# Patient Record
Sex: FEMALE | Race: WHITE | Employment: OTHER | URBAN - METROPOLITAN AREA
[De-identification: names, ages, dates, MRNs, and addresses within clinical notes are randomized per-mention and may not be internally consistent; named-entity substitution may affect disease eponyms.]

---

## 2017-01-04 DIAGNOSIS — Z95.0 PACEMAKER: Primary | ICD-10-CM

## 2017-01-05 RX ORDER — DILTIAZEM HYDROCHLORIDE 360 MG/1
360 CAPSULE, EXTENDED RELEASE ORAL DAILY
Qty: 90 CAPSULE | Refills: 1 | Status: SHIPPED | OUTPATIENT
Start: 2017-01-05 | End: 2017-04-26 | Stop reason: ALTCHOICE

## 2017-01-05 RX ORDER — PRAVASTATIN SODIUM 10 MG
10 TABLET ORAL DAILY
Qty: 30 TABLET | Refills: 3 | Status: SHIPPED | OUTPATIENT
Start: 2017-01-05 | End: 2017-04-26 | Stop reason: SDUPTHER

## 2017-01-23 ENCOUNTER — TELEPHONE (OUTPATIENT)
Dept: CARDIOLOGY CLINIC | Age: 82
End: 2017-01-23

## 2017-01-23 DIAGNOSIS — I48.19 PERSISTENT ATRIAL FIBRILLATION (HCC): Primary | ICD-10-CM

## 2017-01-23 DIAGNOSIS — Z95.0 PACEMAKER: ICD-10-CM

## 2017-01-23 RX ORDER — DILTIAZEM HYDROCHLORIDE 180 MG/1
180 CAPSULE, COATED, EXTENDED RELEASE ORAL DAILY
Qty: 60 CAPSULE | Refills: 0 | Status: SHIPPED | OUTPATIENT
Start: 2017-01-23 | End: 2017-02-06 | Stop reason: SDUPTHER

## 2017-01-30 ENCOUNTER — TELEPHONE (OUTPATIENT)
Dept: CARDIOLOGY CLINIC | Age: 82
End: 2017-01-30

## 2017-01-31 ENCOUNTER — PROCEDURE VISIT (OUTPATIENT)
Dept: CARDIOLOGY CLINIC | Age: 82
End: 2017-01-31

## 2017-01-31 DIAGNOSIS — Z95.0 CARDIAC PACEMAKER IN SITU: Primary | ICD-10-CM

## 2017-01-31 PROCEDURE — 93280 PM DEVICE PROGR EVAL DUAL: CPT | Performed by: INTERNAL MEDICINE

## 2017-02-06 DIAGNOSIS — Z95.0 PACEMAKER: ICD-10-CM

## 2017-02-06 DIAGNOSIS — I48.19 PERSISTENT ATRIAL FIBRILLATION (HCC): ICD-10-CM

## 2017-02-06 RX ORDER — DILTIAZEM HYDROCHLORIDE 180 MG/1
180 CAPSULE, COATED, EXTENDED RELEASE ORAL DAILY
Qty: 180 CAPSULE | Refills: 1 | Status: SHIPPED | OUTPATIENT
Start: 2017-02-06 | End: 2017-04-26 | Stop reason: ALTCHOICE

## 2017-02-08 ENCOUNTER — TELEPHONE (OUTPATIENT)
Dept: CARDIOLOGY CLINIC | Age: 82
End: 2017-02-08

## 2017-02-09 ENCOUNTER — TELEPHONE (OUTPATIENT)
Dept: CARDIOLOGY CLINIC | Age: 82
End: 2017-02-09

## 2017-02-09 DIAGNOSIS — I48.19 PERSISTENT ATRIAL FIBRILLATION (HCC): Primary | ICD-10-CM

## 2017-02-09 RX ORDER — DILTIAZEM HYDROCHLORIDE 360 MG/1
360 CAPSULE, EXTENDED RELEASE ORAL DAILY
Qty: 90 CAPSULE | Refills: 3 | Status: SHIPPED | OUTPATIENT
Start: 2017-02-09 | End: 2017-11-27 | Stop reason: SDUPTHER

## 2017-02-10 ENCOUNTER — TELEPHONE (OUTPATIENT)
Dept: CARDIOLOGY CLINIC | Age: 82
End: 2017-02-10

## 2017-04-26 ENCOUNTER — OFFICE VISIT (OUTPATIENT)
Dept: CARDIOLOGY CLINIC | Age: 82
End: 2017-04-26

## 2017-04-26 VITALS
BODY MASS INDEX: 20.81 KG/M2 | SYSTOLIC BLOOD PRESSURE: 142 MMHG | HEIGHT: 67 IN | DIASTOLIC BLOOD PRESSURE: 66 MMHG | WEIGHT: 132.6 LBS | HEART RATE: 84 BPM

## 2017-04-26 DIAGNOSIS — Z95.0 PACEMAKER: Primary | ICD-10-CM

## 2017-04-26 PROCEDURE — 93280 PM DEVICE PROGR EVAL DUAL: CPT | Performed by: INTERNAL MEDICINE

## 2017-04-26 PROCEDURE — G8419 CALC BMI OUT NRM PARAM NOF/U: HCPCS | Performed by: INTERNAL MEDICINE

## 2017-04-26 PROCEDURE — 1090F PRES/ABSN URINE INCON ASSESS: CPT | Performed by: INTERNAL MEDICINE

## 2017-04-26 PROCEDURE — 1036F TOBACCO NON-USER: CPT | Performed by: INTERNAL MEDICINE

## 2017-04-26 PROCEDURE — G8427 DOCREV CUR MEDS BY ELIG CLIN: HCPCS | Performed by: INTERNAL MEDICINE

## 2017-04-26 PROCEDURE — 4040F PNEUMOC VAC/ADMIN/RCVD: CPT | Performed by: INTERNAL MEDICINE

## 2017-04-26 PROCEDURE — 99213 OFFICE O/P EST LOW 20 MIN: CPT | Performed by: INTERNAL MEDICINE

## 2017-04-26 PROCEDURE — 1123F ACP DISCUSS/DSCN MKR DOCD: CPT | Performed by: INTERNAL MEDICINE

## 2017-04-26 RX ORDER — PRAVASTATIN SODIUM 10 MG
10 TABLET ORAL DAILY
Qty: 90 TABLET | Refills: 2 | Status: SHIPPED | OUTPATIENT
Start: 2017-04-26 | End: 2017-11-27 | Stop reason: SDUPTHER

## 2017-04-26 ASSESSMENT — ENCOUNTER SYMPTOMS
BACK PAIN: 0
DIARRHEA: 0
NAUSEA: 0
CHEST TIGHTNESS: 0
VOMITING: 0
COLOR CHANGE: 0
WHEEZING: 0
BLOOD IN STOOL: 0
EYE PAIN: 0
ABDOMINAL PAIN: 0
PHOTOPHOBIA: 0
COUGH: 0
SHORTNESS OF BREATH: 1
CONSTIPATION: 0

## 2017-05-01 ENCOUNTER — TELEPHONE (OUTPATIENT)
Dept: CARDIOLOGY CLINIC | Age: 82
End: 2017-05-01

## 2017-05-02 ENCOUNTER — TELEPHONE (OUTPATIENT)
Dept: CARDIOLOGY CLINIC | Age: 82
End: 2017-05-02

## 2017-05-08 ENCOUNTER — TELEPHONE (OUTPATIENT)
Dept: CARDIOLOGY CLINIC | Age: 82
End: 2017-05-08

## 2017-05-09 ENCOUNTER — PROCEDURE VISIT (OUTPATIENT)
Dept: CARDIOLOGY CLINIC | Age: 82
End: 2017-05-09

## 2017-05-09 DIAGNOSIS — Z95.0 CARDIAC PACEMAKER IN SITU: Primary | ICD-10-CM

## 2017-05-09 PROCEDURE — 93294 REM INTERROG EVL PM/LDLS PM: CPT | Performed by: INTERNAL MEDICINE

## 2017-05-09 PROCEDURE — 93296 REM INTERROG EVL PM/IDS: CPT | Performed by: INTERNAL MEDICINE

## 2017-08-14 ENCOUNTER — PROCEDURE VISIT (OUTPATIENT)
Dept: CARDIOLOGY CLINIC | Age: 82
End: 2017-08-14

## 2017-08-14 DIAGNOSIS — Z95.0 CARDIAC PACEMAKER IN SITU: Primary | ICD-10-CM

## 2017-08-14 PROCEDURE — 93296 REM INTERROG EVL PM/IDS: CPT | Performed by: INTERNAL MEDICINE

## 2017-08-14 PROCEDURE — 93294 REM INTERROG EVL PM/LDLS PM: CPT | Performed by: INTERNAL MEDICINE

## 2017-10-25 ENCOUNTER — TELEPHONE (OUTPATIENT)
Dept: CARDIOLOGY CLINIC | Age: 82
End: 2017-10-25

## 2017-10-26 ENCOUNTER — TELEPHONE (OUTPATIENT)
Dept: CARDIOLOGY CLINIC | Age: 82
End: 2017-10-26

## 2017-11-08 ENCOUNTER — OFFICE VISIT (OUTPATIENT)
Dept: CARDIOLOGY CLINIC | Age: 82
End: 2017-11-08

## 2017-11-08 VITALS
HEIGHT: 67 IN | HEART RATE: 80 BPM | BODY MASS INDEX: 22.76 KG/M2 | WEIGHT: 145 LBS | DIASTOLIC BLOOD PRESSURE: 62 MMHG | SYSTOLIC BLOOD PRESSURE: 136 MMHG

## 2017-11-08 DIAGNOSIS — I48.0 PAF (PAROXYSMAL ATRIAL FIBRILLATION) (HCC): Primary | ICD-10-CM

## 2017-11-08 PROCEDURE — 1123F ACP DISCUSS/DSCN MKR DOCD: CPT | Performed by: INTERNAL MEDICINE

## 2017-11-08 PROCEDURE — 1036F TOBACCO NON-USER: CPT | Performed by: INTERNAL MEDICINE

## 2017-11-08 PROCEDURE — G8484 FLU IMMUNIZE NO ADMIN: HCPCS | Performed by: INTERNAL MEDICINE

## 2017-11-08 PROCEDURE — G8428 CUR MEDS NOT DOCUMENT: HCPCS | Performed by: INTERNAL MEDICINE

## 2017-11-08 PROCEDURE — 4040F PNEUMOC VAC/ADMIN/RCVD: CPT | Performed by: INTERNAL MEDICINE

## 2017-11-08 PROCEDURE — G8420 CALC BMI NORM PARAMETERS: HCPCS | Performed by: INTERNAL MEDICINE

## 2017-11-08 PROCEDURE — 99213 OFFICE O/P EST LOW 20 MIN: CPT | Performed by: INTERNAL MEDICINE

## 2017-11-08 PROCEDURE — 1090F PRES/ABSN URINE INCON ASSESS: CPT | Performed by: INTERNAL MEDICINE

## 2017-11-08 ASSESSMENT — ENCOUNTER SYMPTOMS
ABDOMINAL PAIN: 0
NAUSEA: 0
DIARRHEA: 0
EYE PAIN: 0
BLOOD IN STOOL: 0
COUGH: 0
PHOTOPHOBIA: 0
VOMITING: 0
CHEST TIGHTNESS: 0
CONSTIPATION: 0
COLOR CHANGE: 0
WHEEZING: 0
SHORTNESS OF BREATH: 1
BACK PAIN: 0

## 2017-11-08 NOTE — PROGRESS NOTES
Electrophysiology FU Note      Chief complaint :  Shortness of breath    Primary care physician: Adam Lozano MD      History of Present Illness: This visit (11/8/2017)      Chief Complaint   Patient presents with    Loss of Consciousness     Gu-6 month follow. Patient denies CP, dizziness or palpitations. She does report occasional SOB on exertion as well as swelling to the right ankle off and on. She is no longer taking diltiazem and it was replaced with another medication but patient or daughter can't recall the medication at this time. Previous visit:  (4/26/2017)      Chief Complaint   Patient presents with    6 Month Follow-Up     Pt here for 6 month office visit. Patient denies CP, dizziness or palpitations. Does report some SOB with exertion and swelling to the right leg which improved. She tripped and fell few days ago. Previous visit:    Chief Complaint   Patient presents with    3 Month Follow-Up     here for 3 month follow up from the pacemaker implant. She states that there is some tingling around the pacemaker. Her incision is healed well. She denies chest pain, shortness of breath, dizziness, palpitations, and edema. Past medical history:   Past Medical History:   Diagnosis Date    Arthritis     Hyperlipidemia     Hypertension     Pacemaker 6/9/16    Medtronic Dual chamber implanted per Angie Artist for Asystole for 6 seconds (SSS & intermittent high grade AVB).  SSS (sick sinus syndrome) ([address] 6/16       Surgical history :   Past Surgical History:   Procedure Laterality Date    APPENDECTOMY      BACK SURGERY      HIP SURGERY      KNEE SURGERY      PACEMAKER INSERTION  6/9/16    Medtronic Dual-Chamber pacemaker per Angie Artist. Family history: no sudden death history    Social history :  reports that she has never smoked. She does not have any smokeless tobacco history on file.  She reports that she does not drink alcohol or use drugs. No Known Allergies    Current Outpatient Prescriptions on File Prior to Visit   Medication Sig Dispense Refill    Acetaminophen 650 MG TABS Take 650 mg by mouth every 6 hours as needed 120 tablet 3    donepezil (ARICEPT) 10 MG tablet Take 20 mg by mouth nightly      docusate sodium (COLACE) 100 MG capsule Take 1 capsule by mouth 2 times daily. 30 capsule 0    aspirin 81 MG chewable tablet Take 81 mg by mouth daily.  calcium carbonate (OSCAL) 500 MG TABS tablet Take 500 mg by mouth nightly.  Omega-3 Fatty Acids (FISH OIL) 1200 MG CAPS Take 1,200 mg by mouth Daily.  omeprazole (PRILOSEC) 20 MG capsule Take 20 mg by mouth daily.  MULTIPLE VITAMIN PO Take  by mouth daily. Cardizem 240 now change to 360mg daily  Simvastatin changed to pravastatin    Review of Systems:   Review of Systems   Constitutional: Negative for activity change, chills and fever. HENT: Negative for congestion, ear pain and tinnitus. Eyes: Negative for photophobia, pain and visual disturbance. Respiratory: Positive for shortness of breath. Negative for cough, chest tightness and wheezing. Cardiovascular: Negative for chest pain, palpitations and leg swelling. Gastrointestinal: Negative for abdominal pain, blood in stool, constipation, diarrhea, nausea and vomiting. Endocrine: Negative for cold intolerance and heat intolerance. Genitourinary: Negative for dysuria, flank pain and hematuria. Musculoskeletal: Positive for arthralgias. Negative for back pain, myalgias and neck stiffness. Skin: Negative for color change and rash. Allergic/Immunologic: Negative for food allergies. Neurological: Negative for dizziness, light-headedness, numbness and headaches. Hematological: Does not bruise/bleed easily. Psychiatric/Behavioral: Negative for agitation, behavioral problems and confusion.            Physical Examination:    /62   Pulse 80   Ht 5' 7\" (1.702 m) Wt 145 lb (65.8 kg)   BMI 22.71 kg/m²    Wt Readings from Last 3 Encounters:   11/08/17 145 lb (65.8 kg)   08/22/17 132 lb (59.9 kg)   04/26/17 132 lb 9.6 oz (60.1 kg)     Body mass index is 22.71 kg/m². Physical Exam   Constitutional: She is oriented to person, place, and time and well-developed, well-nourished, and in no distress. HENT:   Head: Normocephalic and atraumatic. Eyes: Conjunctivae and EOM are normal. Pupils are equal, round, and reactive to light. Right eye exhibits no discharge. Neck: Normal range of motion. No JVD present. No thyromegaly present. Cardiovascular: Normal rate, regular rhythm and normal heart sounds. Pulmonary/Chest: Effort normal and breath sounds normal. No stridor. No respiratory distress. She has no wheezes. Abdominal: Soft. Bowel sounds are normal. She exhibits no distension. There is no tenderness. Musculoskeletal: Normal range of motion. She exhibits no edema or tenderness. Neurological: She is alert and oriented to person, place, and time. No cranial nerve deficit. Skin: Skin is warm and dry. No rash noted. No erythema. Psychiatric: Mood and affect normal.               CBC:   Lab Results   Component Value Date    WBC 8.6 08/22/2017    HGB 14.5 08/22/2017    HCT 43.4 08/22/2017     08/22/2017     Lipids:   Lab Results   Component Value Date    CHOL 119 05/28/2014    TRIG 94 05/28/2014    HDL 46 (L) 05/28/2014    LDLDIRECT 57 05/28/2014     PT/INR:   Lab Results   Component Value Date    INR 0.97 02/10/2015        BMP:    Lab Results   Component Value Date     08/22/2017    K 4.1 08/22/2017    CL 98 (L) 08/22/2017    CO2 25 08/22/2017    BUN 21 08/22/2017     CMP:   Lab Results   Component Value Date    AST 21 08/22/2017    PROT 7.0 08/22/2017    BILITOT 0.3 08/22/2017    ALKPHOS 88 08/22/2017     TSH:  No results found for: TSH        IMPRESSION / RECOMMENDATIONS:     1. Sick sinus syndrome Sp pacemaker   2. HTN  3. HLD  4.  Mild dementia  5. ?PAF    Device Assessment:      The device is Medtronic pacemaker - Dual Chamber chamber      MRI Compatible : yes    Device interrogation was performed. Mode: aair---dddr     Sensing is normal. Impedence is normal.  Threshold is normal.     There has not been interval changes. Estimated battery life is 8.5 years     The underlying rhythm is as, vs.  36.2 % atrial paced; 0.2 % ventricular paced. Atrial Arrhythmia : No    Non sustained VT episodes : No    Sustained VT episodes : No    Patient activity reported 2.1 hrs/day    The patient partial pacemaker dependent.         Patient on aspirin - no atrial fibrillation noted  Patient is doing well over all  Continue cardizem (I am not sure if PCP changed her to some other cardizem - patient daughter is going to call us and let us know)   Follow up in 6 months        Yung Small MD, 11/8/2017 3:17 PM

## 2017-11-09 ENCOUNTER — TELEPHONE (OUTPATIENT)
Dept: CARDIOLOGY CLINIC | Age: 82
End: 2017-11-09

## 2017-11-20 ENCOUNTER — PROCEDURE VISIT (OUTPATIENT)
Dept: CARDIOLOGY CLINIC | Age: 82
End: 2017-11-20

## 2017-11-20 DIAGNOSIS — Z95.0 CARDIAC PACEMAKER IN SITU: Primary | ICD-10-CM

## 2017-11-20 PROCEDURE — 93294 REM INTERROG EVL PM/LDLS PM: CPT | Performed by: INTERNAL MEDICINE

## 2017-11-20 PROCEDURE — 93296 REM INTERROG EVL PM/IDS: CPT | Performed by: INTERNAL MEDICINE

## 2017-11-27 DIAGNOSIS — I48.19 PERSISTENT ATRIAL FIBRILLATION (HCC): ICD-10-CM

## 2017-11-27 DIAGNOSIS — Z95.0 PACEMAKER: ICD-10-CM

## 2017-11-28 RX ORDER — DILTIAZEM HYDROCHLORIDE 360 MG/1
CAPSULE, EXTENDED RELEASE ORAL
Qty: 90 CAPSULE | Refills: 3 | Status: ON HOLD | OUTPATIENT
Start: 2017-11-28 | End: 2018-07-23 | Stop reason: HOSPADM

## 2017-11-28 RX ORDER — PRAVASTATIN SODIUM 10 MG
TABLET ORAL
Qty: 90 TABLET | Refills: 2 | Status: SHIPPED | OUTPATIENT
Start: 2017-11-28 | End: 2018-06-22 | Stop reason: SDUPTHER

## 2018-02-26 ENCOUNTER — PROCEDURE VISIT (OUTPATIENT)
Dept: CARDIOLOGY CLINIC | Age: 83
End: 2018-02-26

## 2018-02-26 DIAGNOSIS — Z95.0 CARDIAC PACEMAKER IN SITU: Primary | ICD-10-CM

## 2018-02-26 PROCEDURE — 93296 REM INTERROG EVL PM/IDS: CPT | Performed by: INTERNAL MEDICINE

## 2018-02-26 PROCEDURE — 93294 REM INTERROG EVL PM/LDLS PM: CPT | Performed by: INTERNAL MEDICINE

## 2018-05-11 ENCOUNTER — OFFICE VISIT (OUTPATIENT)
Dept: CARDIOLOGY CLINIC | Age: 83
End: 2018-05-11

## 2018-05-11 ENCOUNTER — TELEPHONE (OUTPATIENT)
Dept: CARDIOLOGY CLINIC | Age: 83
End: 2018-05-11

## 2018-05-11 VITALS
WEIGHT: 144.8 LBS | OXYGEN SATURATION: 97 % | BODY MASS INDEX: 23.27 KG/M2 | HEART RATE: 70 BPM | HEIGHT: 66 IN | DIASTOLIC BLOOD PRESSURE: 60 MMHG | SYSTOLIC BLOOD PRESSURE: 122 MMHG

## 2018-05-11 DIAGNOSIS — Z95.0 PACEMAKER: Primary | ICD-10-CM

## 2018-05-11 PROCEDURE — 1090F PRES/ABSN URINE INCON ASSESS: CPT | Performed by: INTERNAL MEDICINE

## 2018-05-11 PROCEDURE — 99212 OFFICE O/P EST SF 10 MIN: CPT | Performed by: INTERNAL MEDICINE

## 2018-05-11 PROCEDURE — G8420 CALC BMI NORM PARAMETERS: HCPCS | Performed by: INTERNAL MEDICINE

## 2018-05-11 PROCEDURE — G8427 DOCREV CUR MEDS BY ELIG CLIN: HCPCS | Performed by: INTERNAL MEDICINE

## 2018-05-11 PROCEDURE — 1123F ACP DISCUSS/DSCN MKR DOCD: CPT | Performed by: INTERNAL MEDICINE

## 2018-05-11 PROCEDURE — 93280 PM DEVICE PROGR EVAL DUAL: CPT | Performed by: INTERNAL MEDICINE

## 2018-05-11 PROCEDURE — 1036F TOBACCO NON-USER: CPT | Performed by: INTERNAL MEDICINE

## 2018-05-11 PROCEDURE — 4040F PNEUMOC VAC/ADMIN/RCVD: CPT | Performed by: INTERNAL MEDICINE

## 2018-05-11 ASSESSMENT — ENCOUNTER SYMPTOMS
WHEEZING: 0
BLOOD IN STOOL: 0
VOMITING: 0
COLOR CHANGE: 0
ABDOMINAL PAIN: 0
CONSTIPATION: 0
EYE PAIN: 0
BACK PAIN: 0
SHORTNESS OF BREATH: 1
COUGH: 0
DIARRHEA: 0
PHOTOPHOBIA: 0
NAUSEA: 0
CHEST TIGHTNESS: 0

## 2018-05-14 ENCOUNTER — TELEPHONE (OUTPATIENT)
Dept: CARDIOLOGY CLINIC | Age: 83
End: 2018-05-14

## 2018-06-04 ENCOUNTER — TELEPHONE (OUTPATIENT)
Dept: CARDIOLOGY CLINIC | Age: 83
End: 2018-06-04

## 2018-06-07 ENCOUNTER — PROCEDURE VISIT (OUTPATIENT)
Dept: CARDIOLOGY CLINIC | Age: 83
End: 2018-06-07

## 2018-06-07 ENCOUNTER — TELEPHONE (OUTPATIENT)
Dept: CARDIOLOGY CLINIC | Age: 83
End: 2018-06-07

## 2018-06-07 DIAGNOSIS — Z95.0 CARDIAC PACEMAKER IN SITU: Primary | ICD-10-CM

## 2018-06-07 PROCEDURE — 93294 REM INTERROG EVL PM/LDLS PM: CPT | Performed by: INTERNAL MEDICINE

## 2018-06-07 PROCEDURE — 93296 REM INTERROG EVL PM/IDS: CPT | Performed by: INTERNAL MEDICINE

## 2018-06-22 DIAGNOSIS — Z95.0 PACEMAKER: ICD-10-CM

## 2018-06-22 RX ORDER — PRAVASTATIN SODIUM 10 MG
TABLET ORAL
Qty: 90 TABLET | Refills: 1 | Status: SHIPPED | OUTPATIENT
Start: 2018-06-22 | End: 2018-11-12 | Stop reason: SDUPTHER

## 2018-07-21 PROBLEM — K59.00 OBSTIPATION: Status: ACTIVE | Noted: 2018-07-21

## 2018-09-09 PROCEDURE — 93296 REM INTERROG EVL PM/IDS: CPT | Performed by: INTERNAL MEDICINE

## 2018-09-09 PROCEDURE — 93294 REM INTERROG EVL PM/LDLS PM: CPT | Performed by: INTERNAL MEDICINE

## 2018-09-10 ENCOUNTER — PROCEDURE VISIT (OUTPATIENT)
Dept: CARDIOLOGY CLINIC | Age: 83
End: 2018-09-10

## 2018-09-10 DIAGNOSIS — Z95.0 CARDIAC PACEMAKER IN SITU: Primary | ICD-10-CM

## 2018-11-12 DIAGNOSIS — Z95.0 PACEMAKER: ICD-10-CM

## 2018-11-13 RX ORDER — PRAVASTATIN SODIUM 10 MG
10 TABLET ORAL DAILY
Qty: 90 TABLET | Refills: 3 | Status: SHIPPED | OUTPATIENT
Start: 2018-11-13 | End: 2019-05-10 | Stop reason: CLARIF

## 2018-11-18 ENCOUNTER — APPOINTMENT (OUTPATIENT)
Dept: CT IMAGING | Age: 83
End: 2018-11-18
Payer: MEDICARE

## 2018-11-18 ENCOUNTER — HOSPITAL ENCOUNTER (EMERGENCY)
Age: 83
Discharge: HOME | End: 2018-11-18
Attending: EMERGENCY MEDICINE
Payer: MEDICARE

## 2018-11-18 VITALS
SYSTOLIC BLOOD PRESSURE: 197 MMHG | BODY MASS INDEX: 21.22 KG/M2 | OXYGEN SATURATION: 99 % | RESPIRATION RATE: 18 BRPM | DIASTOLIC BLOOD PRESSURE: 83 MMHG | WEIGHT: 140 LBS | TEMPERATURE: 98.2 F | HEIGHT: 68 IN | HEART RATE: 70 BPM

## 2018-11-18 DIAGNOSIS — K56.41 FECAL IMPACTION (HCC): Primary | ICD-10-CM

## 2018-11-18 LAB
ALBUMIN SERPL-MCNC: 3.5 GM/DL (ref 3.4–5)
ALP BLD-CCNC: 68 IU/L (ref 40–129)
ALT SERPL-CCNC: 13 U/L (ref 10–40)
ANION GAP SERPL CALCULATED.3IONS-SCNC: 11 MMOL/L (ref 4–16)
AST SERPL-CCNC: 19 IU/L (ref 15–37)
BASOPHILS ABSOLUTE: 0.1 K/CU MM
BASOPHILS RELATIVE PERCENT: 0.5 % (ref 0–1)
BILIRUB SERPL-MCNC: 0.3 MG/DL (ref 0–1)
BUN BLDV-MCNC: 17 MG/DL (ref 6–23)
CALCIUM SERPL-MCNC: 9.4 MG/DL (ref 8.3–10.6)
CHLORIDE BLD-SCNC: 101 MMOL/L (ref 99–110)
CO2: 27 MMOL/L (ref 21–32)
CREAT SERPL-MCNC: 1 MG/DL (ref 0.6–1.1)
DIFFERENTIAL TYPE: ABNORMAL
EOSINOPHILS ABSOLUTE: 0.1 K/CU MM
EOSINOPHILS RELATIVE PERCENT: 0.8 % (ref 0–3)
GFR AFRICAN AMERICAN: >60 ML/MIN/1.73M2
GFR NON-AFRICAN AMERICAN: 51 ML/MIN/1.73M2
GLUCOSE BLD-MCNC: 103 MG/DL (ref 70–99)
HCT VFR BLD CALC: 44.5 % (ref 37–47)
HEMOGLOBIN: 14.3 GM/DL (ref 12.5–16)
IMMATURE NEUTROPHIL %: 0.4 % (ref 0–0.43)
LYMPHOCYTES ABSOLUTE: 1.1 K/CU MM
LYMPHOCYTES RELATIVE PERCENT: 9.3 % (ref 24–44)
MCH RBC QN AUTO: 29.5 PG (ref 27–31)
MCHC RBC AUTO-ENTMCNC: 32.1 % (ref 32–36)
MCV RBC AUTO: 91.8 FL (ref 78–100)
MONOCYTES ABSOLUTE: 0.9 K/CU MM
MONOCYTES RELATIVE PERCENT: 7.9 % (ref 0–4)
NUCLEATED RBC %: 0 %
PDW BLD-RTO: 14.6 % (ref 11.7–14.9)
PLATELET # BLD: 210 K/CU MM (ref 140–440)
PMV BLD AUTO: 11.5 FL (ref 7.5–11.1)
POTASSIUM SERPL-SCNC: 3.6 MMOL/L (ref 3.5–5.1)
RBC # BLD: 4.85 M/CU MM (ref 4.2–5.4)
SEGMENTED NEUTROPHILS ABSOLUTE COUNT: 9.2 K/CU MM
SEGMENTED NEUTROPHILS RELATIVE PERCENT: 81.1 % (ref 36–66)
SODIUM BLD-SCNC: 139 MMOL/L (ref 135–145)
TOTAL IMMATURE NEUTOROPHIL: 0.04 K/CU MM
TOTAL NUCLEATED RBC: 0 K/CU MM
TOTAL PROTEIN: 6.4 GM/DL (ref 6.4–8.2)
TSH HIGH SENSITIVITY: 4.23 UIU/ML (ref 0.27–4.2)
WBC # BLD: 11.4 K/CU MM (ref 4–10.5)

## 2018-11-18 PROCEDURE — 6370000000 HC RX 637 (ALT 250 FOR IP): Performed by: EMERGENCY MEDICINE

## 2018-11-18 PROCEDURE — 85025 COMPLETE CBC W/AUTO DIFF WBC: CPT

## 2018-11-18 PROCEDURE — 36415 COLL VENOUS BLD VENIPUNCTURE: CPT

## 2018-11-18 PROCEDURE — 84443 ASSAY THYROID STIM HORMONE: CPT

## 2018-11-18 PROCEDURE — 99284 EMERGENCY DEPT VISIT MOD MDM: CPT

## 2018-11-18 PROCEDURE — 74176 CT ABD & PELVIS W/O CONTRAST: CPT

## 2018-11-18 PROCEDURE — 80053 COMPREHEN METABOLIC PANEL: CPT

## 2018-11-18 RX ORDER — HYDROCHLOROTHIAZIDE 12.5 MG/1
12.5 CAPSULE, GELATIN COATED ORAL DAILY
Status: ON HOLD | COMMUNITY
End: 2019-09-10 | Stop reason: HOSPADM

## 2018-11-18 RX ORDER — SODIUM PHOSPHATE, DIBASIC AND SODIUM PHOSPHATE, MONOBASIC 7; 19 G/133ML; G/133ML
1 ENEMA RECTAL
Qty: 3 BOTTLE | Refills: 0 | Status: SHIPPED | OUTPATIENT
Start: 2018-11-18 | End: 2018-11-18

## 2018-11-18 RX ADMIN — MAGNESIUM HYDROXIDE 30 ML: 400 SUSPENSION ORAL at 14:56

## 2018-11-18 ASSESSMENT — PAIN DESCRIPTION - PROGRESSION: CLINICAL_PROGRESSION: NOT CHANGED

## 2018-11-18 ASSESSMENT — PAIN DESCRIPTION - FREQUENCY: FREQUENCY: INTERMITTENT

## 2018-11-18 ASSESSMENT — PAIN DESCRIPTION - LOCATION: LOCATION: BUTTOCKS

## 2018-11-18 ASSESSMENT — PAIN DESCRIPTION - PAIN TYPE: TYPE: ACUTE PAIN

## 2018-11-18 ASSESSMENT — PAIN DESCRIPTION - DESCRIPTORS: DESCRIPTORS: PRESSURE

## 2018-11-18 ASSESSMENT — PAIN DESCRIPTION - ONSET: ONSET: GRADUAL

## 2018-11-18 NOTE — ED NOTES
Pt had a small bowel movement. Pt instructed to focus on a BM today and proper use of prescribed medication. Pt and family verbalize understanding.      Rustam Harper RN  11/18/18 [id number]

## 2018-12-16 PROCEDURE — 93294 REM INTERROG EVL PM/LDLS PM: CPT | Performed by: INTERNAL MEDICINE

## 2018-12-16 PROCEDURE — 93296 REM INTERROG EVL PM/IDS: CPT | Performed by: INTERNAL MEDICINE

## 2018-12-18 ENCOUNTER — PROCEDURE VISIT (OUTPATIENT)
Dept: CARDIOLOGY CLINIC | Age: 83
End: 2018-12-18
Payer: MEDICARE

## 2018-12-18 DIAGNOSIS — Z95.0 CARDIAC PACEMAKER IN SITU: Primary | ICD-10-CM

## 2019-01-17 ENCOUNTER — TELEPHONE (OUTPATIENT)
Dept: CARDIOLOGY CLINIC | Age: 84
End: 2019-01-17

## 2019-03-26 ENCOUNTER — PROCEDURE VISIT (OUTPATIENT)
Dept: CARDIOLOGY CLINIC | Age: 84
End: 2019-03-26
Payer: MEDICARE

## 2019-03-26 DIAGNOSIS — I49.5 SINUS NODE DYSFUNCTION (HCC): ICD-10-CM

## 2019-03-26 DIAGNOSIS — Z95.0 CARDIAC PACEMAKER IN SITU: Primary | ICD-10-CM

## 2019-03-26 PROCEDURE — 93296 REM INTERROG EVL PM/IDS: CPT | Performed by: INTERNAL MEDICINE

## 2019-03-26 PROCEDURE — 93294 REM INTERROG EVL PM/LDLS PM: CPT | Performed by: INTERNAL MEDICINE

## 2019-05-10 ENCOUNTER — OFFICE VISIT (OUTPATIENT)
Dept: CARDIOLOGY CLINIC | Age: 84
End: 2019-05-10
Payer: MEDICARE

## 2019-05-10 VITALS
DIASTOLIC BLOOD PRESSURE: 76 MMHG | WEIGHT: 140.6 LBS | SYSTOLIC BLOOD PRESSURE: 132 MMHG | BODY MASS INDEX: 22.07 KG/M2 | HEART RATE: 68 BPM | HEIGHT: 67 IN

## 2019-05-10 DIAGNOSIS — Z95.0 PACEMAKER: Primary | ICD-10-CM

## 2019-05-10 PROCEDURE — 4040F PNEUMOC VAC/ADMIN/RCVD: CPT | Performed by: INTERNAL MEDICINE

## 2019-05-10 PROCEDURE — G8427 DOCREV CUR MEDS BY ELIG CLIN: HCPCS | Performed by: INTERNAL MEDICINE

## 2019-05-10 PROCEDURE — 1090F PRES/ABSN URINE INCON ASSESS: CPT | Performed by: INTERNAL MEDICINE

## 2019-05-10 PROCEDURE — 99212 OFFICE O/P EST SF 10 MIN: CPT | Performed by: INTERNAL MEDICINE

## 2019-05-10 PROCEDURE — G8420 CALC BMI NORM PARAMETERS: HCPCS | Performed by: INTERNAL MEDICINE

## 2019-05-10 PROCEDURE — 1036F TOBACCO NON-USER: CPT | Performed by: INTERNAL MEDICINE

## 2019-05-10 PROCEDURE — 1123F ACP DISCUSS/DSCN MKR DOCD: CPT | Performed by: INTERNAL MEDICINE

## 2019-05-10 RX ORDER — GABAPENTIN 100 MG/1
100 CAPSULE ORAL 3 TIMES DAILY PRN
COMMUNITY
End: 2019-08-18

## 2019-05-10 RX ORDER — BENZONATATE 200 MG/1
200 CAPSULE ORAL 3 TIMES DAILY PRN
COMMUNITY
End: 2019-08-18

## 2019-05-10 RX ORDER — SIMVASTATIN 20 MG
20 TABLET ORAL NIGHTLY
COMMUNITY
End: 2019-08-18

## 2019-05-10 RX ORDER — CETIRIZINE HYDROCHLORIDE 10 MG/1
10 TABLET ORAL PRN
COMMUNITY
End: 2019-08-18

## 2019-05-10 RX ORDER — POLYETHYLENE GLYCOL 3350 17 G/17G
17 POWDER, FOR SOLUTION ORAL DAILY
COMMUNITY

## 2019-05-10 RX ORDER — LABETALOL 100 MG/1
100 TABLET, FILM COATED ORAL 2 TIMES DAILY
COMMUNITY

## 2019-05-10 ASSESSMENT — ENCOUNTER SYMPTOMS
PHOTOPHOBIA: 0
COLOR CHANGE: 0
BACK PAIN: 0
WHEEZING: 0
SHORTNESS OF BREATH: 1
DIARRHEA: 0
EYE PAIN: 0
COUGH: 0
NAUSEA: 0
VOMITING: 0
ABDOMINAL PAIN: 0
BLOOD IN STOOL: 0
CONSTIPATION: 0
CHEST TIGHTNESS: 0

## 2019-05-10 NOTE — PROGRESS NOTES
Electrophysiology FU Note      Chief complaint :  Shortness of breath    Primary care physician: Henna Baird MD      History of Present Illness: This visit (5/10/2019)      Chief Complaint   Patient presents with    Atrial Fibrillation     Patient here for 1 year follow up. Patient denies any CP, SOB, palpitations, dizziness or edema. Does report 1-2 cups of coffee daily. Previous visit:  (5/11/2018)      Chief Complaint   Patient presents with    Atrial Fibrillation     Patient here for 6 month follow up. She states she still has some shortness of breath with exertion but doing much better over all. Patient daughter isn't sure if she is still taking Diltiazem or if it was switched to a different medication. She states \"It is a big green horse pill that she is taking. \" Patient denies chest pain, dizziness, palpitations, and edema. Previous visit: (11/8/2017)      Chief Complaint   Patient presents with    Loss of Consciousness     Gu-6 month follow. Patient denies CP, dizziness or palpitations. She does report occasional SOB on exertion as well as swelling to the right ankle off and on. She is no longer taking diltiazem and it was replaced with another medication but patient or daughter can't recall the medication at this time. Previous visit:  (4/26/2017)      Chief Complaint   Patient presents with    6 Month Follow-Up     Pt here for 6 month office visit. Patient denies CP, dizziness or palpitations. Does report some SOB with exertion and swelling to the right leg which improved. She tripped and fell few days ago. Previous visit:    Chief Complaint   Patient presents with    3 Month Follow-Up     here for 3 month follow up from the pacemaker implant. She states that there is some tingling around the pacemaker. Her incision is healed well.  She denies chest pain, shortness of breath, dizziness, palpitations, and Negative for abdominal pain, blood in stool, constipation, diarrhea, nausea and vomiting. Endocrine: Negative for cold intolerance and heat intolerance. Genitourinary: Negative for dysuria, flank pain and hematuria. Musculoskeletal: Positive for arthralgias. Negative for back pain, myalgias and neck stiffness. Skin: Negative for color change and rash. Allergic/Immunologic: Negative for food allergies. Neurological: Negative for dizziness, light-headedness, numbness and headaches. Hematological: Does not bruise/bleed easily. Psychiatric/Behavioral: Negative for agitation, behavioral problems and confusion. Physical Examination:    /76   Pulse 68   Ht 5' 7\" (1.702 m)   Wt 140 lb 9.6 oz (63.8 kg)   BMI 22.02 kg/m²    Wt Readings from Last 3 Encounters:   05/10/19 140 lb 9.6 oz (63.8 kg)   12/10/18 143 lb (64.9 kg)   11/18/18 140 lb (63.5 kg)     Body mass index is 22.02 kg/m². Physical Exam   Constitutional: She is oriented to person, place, and time and well-developed, well-nourished, and in no distress. HENT:   Head: Normocephalic and atraumatic. Eyes: Pupils are equal, round, and reactive to light. Conjunctivae and EOM are normal. Right eye exhibits no discharge. Neck: Normal range of motion. No JVD present. No thyromegaly present. Cardiovascular: Normal rate, regular rhythm and normal heart sounds. Pulmonary/Chest: Effort normal and breath sounds normal. No stridor. No respiratory distress. She has no wheezes. Abdominal: Soft. Bowel sounds are normal. She exhibits no distension. There is no tenderness. Musculoskeletal: Normal range of motion. She exhibits no edema or tenderness. Neurological: She is alert and oriented to person, place, and time. No cranial nerve deficit. Skin: Skin is warm and dry. No rash noted. No erythema.    Psychiatric: Mood and affect normal.               CBC:   Lab Results   Component Value Date    WBC 11.4 11/18/2018    HGB 14.3

## 2019-07-01 ENCOUNTER — PROCEDURE VISIT (OUTPATIENT)
Dept: CARDIOLOGY CLINIC | Age: 84
End: 2019-07-01
Payer: MEDICARE

## 2019-07-01 ENCOUNTER — TELEPHONE (OUTPATIENT)
Dept: CARDIOLOGY CLINIC | Age: 84
End: 2019-07-01

## 2019-07-01 DIAGNOSIS — Z95.0 CARDIAC PACEMAKER IN SITU: Primary | ICD-10-CM

## 2019-07-01 PROCEDURE — 93296 REM INTERROG EVL PM/IDS: CPT | Performed by: INTERNAL MEDICINE

## 2019-07-01 PROCEDURE — 93294 REM INTERROG EVL PM/LDLS PM: CPT | Performed by: INTERNAL MEDICINE

## 2019-08-18 ENCOUNTER — APPOINTMENT (OUTPATIENT)
Dept: GENERAL RADIOLOGY | Age: 84
DRG: 064 | End: 2019-08-18
Payer: MEDICARE

## 2019-08-18 ENCOUNTER — APPOINTMENT (OUTPATIENT)
Dept: CT IMAGING | Age: 84
DRG: 064 | End: 2019-08-18
Payer: MEDICARE

## 2019-08-18 ENCOUNTER — HOSPITAL ENCOUNTER (INPATIENT)
Age: 84
LOS: 5 days | Discharge: NURSING, REHAB OR LONG-TERM CARE | DRG: 064 | End: 2019-08-23
Attending: EMERGENCY MEDICINE | Admitting: FAMILY MEDICINE
Payer: MEDICARE

## 2019-08-18 ENCOUNTER — APPOINTMENT (OUTPATIENT)
Dept: ULTRASOUND IMAGING | Age: 84
DRG: 064 | End: 2019-08-18
Payer: MEDICARE

## 2019-08-18 DIAGNOSIS — R74.8 ELEVATED LIPASE: ICD-10-CM

## 2019-08-18 DIAGNOSIS — S09.90XA INJURY OF HEAD, INITIAL ENCOUNTER: ICD-10-CM

## 2019-08-18 DIAGNOSIS — R10.10 PAIN OF UPPER ABDOMEN: ICD-10-CM

## 2019-08-18 DIAGNOSIS — R41.82 ALTERED MENTAL STATUS, UNSPECIFIED ALTERED MENTAL STATUS TYPE: Primary | ICD-10-CM

## 2019-08-18 LAB
ALBUMIN SERPL-MCNC: 3.7 GM/DL (ref 3.4–5)
ALP BLD-CCNC: 59 IU/L (ref 40–129)
ALT SERPL-CCNC: 16 U/L (ref 10–40)
AMMONIA: 36 UMOL/L (ref 11–51)
ANION GAP SERPL CALCULATED.3IONS-SCNC: 11 MMOL/L (ref 4–16)
AST SERPL-CCNC: 23 IU/L (ref 15–37)
BACTERIA: NEGATIVE /HPF
BASOPHILS ABSOLUTE: 0 K/CU MM
BASOPHILS RELATIVE PERCENT: 0.5 % (ref 0–1)
BILIRUB SERPL-MCNC: 0.5 MG/DL (ref 0–1)
BILIRUBIN URINE: NEGATIVE MG/DL
BLOOD, URINE: NEGATIVE
BUN BLDV-MCNC: 23 MG/DL (ref 6–23)
CALCIUM SERPL-MCNC: 9.4 MG/DL (ref 8.3–10.6)
CHLORIDE BLD-SCNC: 97 MMOL/L (ref 99–110)
CLARITY: CLEAR
CO2: 29 MMOL/L (ref 21–32)
COLOR: YELLOW
CREAT SERPL-MCNC: 0.9 MG/DL (ref 0.6–1.1)
DIFFERENTIAL TYPE: ABNORMAL
EOSINOPHILS ABSOLUTE: 0.3 K/CU MM
EOSINOPHILS RELATIVE PERCENT: 3.5 % (ref 0–3)
GFR AFRICAN AMERICAN: >60 ML/MIN/1.73M2
GFR NON-AFRICAN AMERICAN: 58 ML/MIN/1.73M2
GLUCOSE BLD-MCNC: 125 MG/DL (ref 70–99)
GLUCOSE, URINE: NEGATIVE MG/DL
HCT VFR BLD CALC: 44.7 % (ref 37–47)
HEMOGLOBIN: 14.8 GM/DL (ref 12.5–16)
IMMATURE NEUTROPHIL %: 1.9 % (ref 0–0.43)
KETONES, URINE: NEGATIVE MG/DL
LACTATE: 1 MMOL/L (ref 0.4–2)
LEUKOCYTE ESTERASE, URINE: NEGATIVE
LIPASE: 73 IU/L (ref 13–60)
LYMPHOCYTES ABSOLUTE: 0.8 K/CU MM
LYMPHOCYTES RELATIVE PERCENT: 11.2 % (ref 24–44)
MCH RBC QN AUTO: 30.3 PG (ref 27–31)
MCHC RBC AUTO-ENTMCNC: 33.1 % (ref 32–36)
MCV RBC AUTO: 91.4 FL (ref 78–100)
MONOCYTES ABSOLUTE: 0.7 K/CU MM
MONOCYTES RELATIVE PERCENT: 9.1 % (ref 0–4)
NITRITE URINE, QUANTITATIVE: NEGATIVE
NUCLEATED RBC %: 0 %
PDW BLD-RTO: 13.2 % (ref 11.7–14.9)
PH, URINE: 8 (ref 5–8)
PLATELET # BLD: 239 K/CU MM (ref 140–440)
PMV BLD AUTO: 10.5 FL (ref 7.5–11.1)
POTASSIUM SERPL-SCNC: 3.8 MMOL/L (ref 3.5–5.1)
PROTEIN UA: NEGATIVE MG/DL
RBC # BLD: 4.89 M/CU MM (ref 4.2–5.4)
RBC URINE: <1 /HPF (ref 0–6)
SEGMENTED NEUTROPHILS ABSOLUTE COUNT: 5.4 K/CU MM
SEGMENTED NEUTROPHILS RELATIVE PERCENT: 73.8 % (ref 36–66)
SODIUM BLD-SCNC: 137 MMOL/L (ref 135–145)
SPECIFIC GRAVITY UA: 1.01 (ref 1–1.03)
TOTAL IMMATURE NEUTOROPHIL: 0.14 K/CU MM
TOTAL NUCLEATED RBC: 0 K/CU MM
TOTAL PROTEIN: 6.2 GM/DL (ref 6.4–8.2)
TRICHOMONAS: NORMAL /HPF
TROPONIN T: <0.01 NG/ML
UROBILINOGEN, URINE: NORMAL MG/DL (ref 0.2–1)
WBC # BLD: 7.3 K/CU MM (ref 4–10.5)
WBC UA: 1 /HPF (ref 0–5)

## 2019-08-18 PROCEDURE — 83605 ASSAY OF LACTIC ACID: CPT

## 2019-08-18 PROCEDURE — 87086 URINE CULTURE/COLONY COUNT: CPT

## 2019-08-18 PROCEDURE — 90471 IMMUNIZATION ADMIN: CPT | Performed by: PHYSICIAN ASSISTANT

## 2019-08-18 PROCEDURE — 72125 CT NECK SPINE W/O DYE: CPT

## 2019-08-18 PROCEDURE — 90715 TDAP VACCINE 7 YRS/> IM: CPT | Performed by: PHYSICIAN ASSISTANT

## 2019-08-18 PROCEDURE — 70450 CT HEAD/BRAIN W/O DYE: CPT

## 2019-08-18 PROCEDURE — 71045 X-RAY EXAM CHEST 1 VIEW: CPT

## 2019-08-18 PROCEDURE — 93010 ELECTROCARDIOGRAM REPORT: CPT | Performed by: INTERNAL MEDICINE

## 2019-08-18 PROCEDURE — 73110 X-RAY EXAM OF WRIST: CPT

## 2019-08-18 PROCEDURE — 6360000002 HC RX W HCPCS: Performed by: PHYSICIAN ASSISTANT

## 2019-08-18 PROCEDURE — 80053 COMPREHEN METABOLIC PANEL: CPT

## 2019-08-18 PROCEDURE — 6370000000 HC RX 637 (ALT 250 FOR IP): Performed by: EMERGENCY MEDICINE

## 2019-08-18 PROCEDURE — 2580000003 HC RX 258: Performed by: NURSE PRACTITIONER

## 2019-08-18 PROCEDURE — 99285 EMERGENCY DEPT VISIT HI MDM: CPT

## 2019-08-18 PROCEDURE — 93880 EXTRACRANIAL BILAT STUDY: CPT

## 2019-08-18 PROCEDURE — 1200000000 HC SEMI PRIVATE

## 2019-08-18 PROCEDURE — 93005 ELECTROCARDIOGRAM TRACING: CPT | Performed by: PHYSICIAN ASSISTANT

## 2019-08-18 PROCEDURE — 6360000002 HC RX W HCPCS: Performed by: NURSE PRACTITIONER

## 2019-08-18 PROCEDURE — 81001 URINALYSIS AUTO W/SCOPE: CPT

## 2019-08-18 PROCEDURE — 6370000000 HC RX 637 (ALT 250 FOR IP): Performed by: NURSE PRACTITIONER

## 2019-08-18 PROCEDURE — 6360000004 HC RX CONTRAST MEDICATION: Performed by: EMERGENCY MEDICINE

## 2019-08-18 PROCEDURE — 82140 ASSAY OF AMMONIA: CPT

## 2019-08-18 PROCEDURE — 94761 N-INVAS EAR/PLS OXIMETRY MLT: CPT

## 2019-08-18 PROCEDURE — 83690 ASSAY OF LIPASE: CPT

## 2019-08-18 PROCEDURE — 74177 CT ABD & PELVIS W/CONTRAST: CPT

## 2019-08-18 PROCEDURE — 85025 COMPLETE CBC W/AUTO DIFF WBC: CPT

## 2019-08-18 PROCEDURE — 84484 ASSAY OF TROPONIN QUANT: CPT

## 2019-08-18 RX ORDER — 0.9 % SODIUM CHLORIDE 0.9 %
1000 INTRAVENOUS SOLUTION INTRAVENOUS ONCE
Status: COMPLETED | OUTPATIENT
Start: 2019-08-18 | End: 2019-08-19

## 2019-08-18 RX ORDER — LABETALOL 100 MG/1
100 TABLET, FILM COATED ORAL 2 TIMES DAILY
Status: DISCONTINUED | OUTPATIENT
Start: 2019-08-18 | End: 2019-08-23 | Stop reason: HOSPADM

## 2019-08-18 RX ORDER — POLYETHYLENE GLYCOL 3350 17 G/17G
17 POWDER, FOR SOLUTION ORAL DAILY PRN
Status: DISCONTINUED | OUTPATIENT
Start: 2019-08-18 | End: 2019-08-22

## 2019-08-18 RX ORDER — SODIUM CHLORIDE 0.9 % (FLUSH) 0.9 %
10 SYRINGE (ML) INJECTION PRN
Status: DISCONTINUED | OUTPATIENT
Start: 2019-08-18 | End: 2019-08-23 | Stop reason: HOSPADM

## 2019-08-18 RX ORDER — PRAVASTATIN SODIUM 10 MG
10 TABLET ORAL NIGHTLY
Status: DISCONTINUED | OUTPATIENT
Start: 2019-08-19 | End: 2019-08-23 | Stop reason: HOSPADM

## 2019-08-18 RX ORDER — ASPIRIN 600 MG/1
300 SUPPOSITORY RECTAL DAILY
Status: DISCONTINUED | OUTPATIENT
Start: 2019-08-18 | End: 2019-08-23 | Stop reason: HOSPADM

## 2019-08-18 RX ORDER — DOCUSATE SODIUM 100 MG/1
100 CAPSULE, LIQUID FILLED ORAL 2 TIMES DAILY
Status: DISCONTINUED | OUTPATIENT
Start: 2019-08-19 | End: 2019-08-23 | Stop reason: HOSPADM

## 2019-08-18 RX ORDER — ACETAMINOPHEN 500 MG
1000 TABLET ORAL ONCE
Status: COMPLETED | OUTPATIENT
Start: 2019-08-18 | End: 2019-08-18

## 2019-08-18 RX ORDER — LISINOPRIL 10 MG/1
10 TABLET ORAL DAILY
Status: DISCONTINUED | OUTPATIENT
Start: 2019-08-19 | End: 2019-08-19

## 2019-08-18 RX ORDER — FAMOTIDINE 20 MG/1
20 TABLET, FILM COATED ORAL DAILY
Status: DISCONTINUED | OUTPATIENT
Start: 2019-08-19 | End: 2019-08-23 | Stop reason: HOSPADM

## 2019-08-18 RX ORDER — HYDROCHLOROTHIAZIDE 12.5 MG/1
12.5 TABLET ORAL DAILY
Status: DISCONTINUED | OUTPATIENT
Start: 2019-08-19 | End: 2019-08-19

## 2019-08-18 RX ORDER — ASPIRIN 81 MG/1
81 TABLET, CHEWABLE ORAL DAILY
Status: DISCONTINUED | OUTPATIENT
Start: 2019-08-19 | End: 2019-08-18 | Stop reason: SDUPTHER

## 2019-08-18 RX ORDER — SODIUM CHLORIDE 0.9 % (FLUSH) 0.9 %
10 SYRINGE (ML) INJECTION EVERY 12 HOURS SCHEDULED
Status: DISCONTINUED | OUTPATIENT
Start: 2019-08-18 | End: 2019-08-23 | Stop reason: HOSPADM

## 2019-08-18 RX ORDER — ASPIRIN 81 MG/1
81 TABLET ORAL DAILY
Status: DISCONTINUED | OUTPATIENT
Start: 2019-08-18 | End: 2019-08-23 | Stop reason: HOSPADM

## 2019-08-18 RX ORDER — PRAVASTATIN SODIUM 10 MG
10 TABLET ORAL NIGHTLY
COMMUNITY
End: 2019-09-12 | Stop reason: SDUPTHER

## 2019-08-18 RX ORDER — ONDANSETRON 2 MG/ML
4 INJECTION INTRAMUSCULAR; INTRAVENOUS EVERY 6 HOURS PRN
Status: DISCONTINUED | OUTPATIENT
Start: 2019-08-18 | End: 2019-08-23 | Stop reason: HOSPADM

## 2019-08-18 RX ADMIN — ASPIRIN 81 MG: 81 TABLET, COATED ORAL at 17:35

## 2019-08-18 RX ADMIN — ENOXAPARIN SODIUM 30 MG: 30 INJECTION SUBCUTANEOUS at 17:35

## 2019-08-18 RX ADMIN — HYDRALAZINE HYDROCHLORIDE 10 MG: 20 INJECTION INTRAMUSCULAR; INTRAVENOUS at 22:00

## 2019-08-18 RX ADMIN — SODIUM CHLORIDE 1000 ML: 9 INJECTION, SOLUTION INTRAVENOUS at 17:35

## 2019-08-18 RX ADMIN — IOPAMIDOL 75 ML: 755 INJECTION, SOLUTION INTRAVENOUS at 10:16

## 2019-08-18 RX ADMIN — TETANUS TOXOID, REDUCED DIPHTHERIA TOXOID AND ACELLULAR PERTUSSIS VACCINE, ADSORBED 0.5 ML: 5; 2.5; 8; 8; 2.5 SUSPENSION INTRAMUSCULAR at 09:56

## 2019-08-18 RX ADMIN — ACETAMINOPHEN 1000 MG: 500 TABLET ORAL at 12:09

## 2019-08-18 ASSESSMENT — PAIN SCALES - GENERAL
PAINLEVEL_OUTOF10: 4
PAINLEVEL_OUTOF10: 8

## 2019-08-18 NOTE — PROGRESS NOTES
Medication History  [facility]    Patient Name: Temo Mckenna 6/26/1921     Medication history has been completed by: Sonali Perez CPhT    Source(s) of information: Insurance claims and Family    Primary Care Physician: Rick Fenton MD     Pharmacy: [facility]    Allergies as of 08/18/2019    (No Known Allergies)        Prior to Admission medications    Medication Sig Start Date End Date Taking? Authorizing Provider   pravastatin (PRAVACHOL) 10 MG tablet Take 10 mg by mouth nightly   Yes Historical Provider, MD   aspirin 81 MG tablet Take 81 mg by mouth daily   Yes Historical Provider, MD   labetalol (NORMODYNE) 100 MG tablet Take 100 mg by mouth 2 times daily   Yes Historical Provider, MD   hydrochlorothiazide (MICROZIDE) 12.5 MG capsule Take 12.5 mg by mouth daily   Yes Historical Provider, MD   lisinopril (PRINIVIL;ZESTRIL) 10 MG tablet Take 1 tablet by mouth daily 7/24/18  Yes Oliver Toro MD   Multiple Vitamins-Minerals (ICAPS AREDS 2 PO) Take 2 capsules by mouth daily    Yes Historical Provider, MD   docusate sodium (COLACE) 100 MG capsule Take 1 capsule by mouth 2 times daily. 2/10/15  Yes Diandra Shah MD   MULTIPLE VITAMIN PO Take 1 tablet by mouth daily    Yes Historical Provider, MD   polyethylene glycol (MIRALAX) powder Take 17 g by mouth daily as needed     Historical Provider, MD   benzonatate (TESSALON) 200 MG capsule Take 200 mg by mouth 3 times daily as needed for Cough    Historical Provider, MD   cetirizine (ZYRTEC) 10 MG tablet Take 10 mg by mouth as needed for Allergies    Historical Provider, MD   Methylcellulose, Laxative, (CITRUCEL PO) Take 1 tablet by mouth 2 times daily as needed     Historical Provider, MD   FIBER COMPLETE PO Take by mouth 2 times daily    Historical Provider, MD   Acetaminophen 650 MG TABS Take 650 mg by mouth every 6 hours as needed 6/10/16   Juany Barker MD       Medications added or changed (ex.  new medication, dosage

## 2019-08-18 NOTE — H&P
History and Physical      Name:  Laureano Feliz /Age/Sex: 1921  (80 y.o. female)   MRN & CSN:  [id number] & [id number] Admission Date/Time: 2019  8:56 AM   Location:  ED[facility] PCP: Elena Hernandez MD       Hospital Day: 1    Discussed patient and POC with Dr. Ana Sue and Plan:     Laureano Feliz is a 80 y.o.  female  who presents with altered mental status, onset 3 days ago. - Altered mental status  ? CVA; no s/sx of infectious process; ammonia 36  Urine culture sent by ED; UA has no signs of infection  NIHSS 3  Neuro checks q 4 hrs  PT/OT/Speech, swallow eval  Fall precautions, up with assistance  Carotid, ECHO  MRI/MRA without contrast  Consult to Neuro  Post-contrast IVF, 100/hr, 1 L total    - HTN  Takes Acei, HCTZ, labetalol at home  PRN hydralazine for SBP > 180 and restart home meds if swallow screen passed    - SSS s/p pacemaker  Atrial-paced, dual chamber, MRI-compatible  Holding on CARD consult; 1 fall reported    - HLD- Cont statin    - Neuropathy  Takes leigha- holding due to #1      Discussed patient with ED physician    Diet NPO until swallow eval; then AAT to cardiac   DVT Prophylaxis [x] Lovenox, []  Heparin, [] SCDs, [] Ambulation   GI Prophylaxis [] PPI,  [x] H2 Blocker,  [] Carafate,  [] Diet/Tube Feeds   Code Status Full   Disposition Patient requires continued admission due to    MDM [] Low, [] Moderate,[x]  High  Patient's risk as above due to      [] One or more chronic illnesses with exacerbation progression      [x] Two or more stable chronic illnesses      [x] Undiagnosed new problem with uncertain prognosis      [] Elective major surgery      []Prescription drug management     History of Present Illness:     Chief Complaint: Altered mental status    Laureano Feliz is a 80 y.o.  female  who presents from home with altered mental status, onset Thursday morning.   Context is, patient was living independently until 1 year ago when her daughter moved in with her to provide more assistance. Daughter states the patient was still performing ADLs without assistance and is AAOx3 at baseline with no problems with cognition, memory. She ambulates at baseline with a cane. Thursday morning, upon awakening, the patient was noted to be confused, had a slight facial droop and slurring of speech. EMS was not called; daughter states she took the patient to her PCP Thursday afternoon and was told it was likely a TIA. She reports patient still had sx at that time. They went home and symptoms persisted but did not worsen, according to the daughter, until today, when the patient fell while in the bathroom. This was a witnessed fall without LOC. On exam, patient is mildly somnolent, opens eyes to verbal stim, follows commands, demonstrates no focal weakness to extremities. She is not oriented to year, has slight slurring of speech, and mild R facial droop. Daughter advises the patient has a living will at home but she doesn't know what it states. She wants to discuss code status with her brother and agreed to leave as full code now until they talk. Patient denies chest pain/pressure, palpitations, sob, abdominal/back/flank pain, n/v, recent diarrhea. Reports pain to R wrist.     Ten point ROS reviewed negative, unless as noted above    Objective:   No intake or output data in the 24 hours ending 08/18/19 1214   Vitals:   Vitals:    08/18/19 1002   BP: 163/91   Pulse: 80   Resp: 18   Temp: 97.4   SpO2: 93%     Physical Exam:   GEN Asleep female, awakens to voice, in no apparent distress. Appears given age. EYES Pupils are 3mm and PERRLA bilat. EOMs intact. No scleral erythema, discharge, or conjunctivitis. HENT Mucous membranes are moist. Oral pharynx without exudates, no evidence of thrush. NECK Supple, no apparent thyromegaly or masses. RESP Clear to auscultation, no wheezes, rales or rhonchi. Symmetric chest movement while on room air. CARDIO/VASC S1/S2 auscultated.  Regular rate without appreciable murmurs, rubs, or gallops. No JVD or carotid bruits. Peripheral pulses equal bilaterally and palpable. No peripheral edema. GI Abdomen is soft without significant tenderness, masses, or guarding. Bowel sounds are normoactive. Rectal exam deferred.  No costovertebral angle tenderness. Wills catheter is not present. HEME/LYMPH No palpable cervical lymphadenopathy and no hepatosplenomegaly. No petechiae or ecchymoses. MSK No gross joint deformities. Demonstrates equal strength at bilaterally to delt/biceps/triceps/grasp/finger intrinsics, hip, knee, pf/df. Sensation intact. SKIN Normal coloration, warm, dry. NEURO Cranial nerves appear grossly intact, normal speech, no lateralizing weakness. Sensation intact and equal bilaterally  PSYCH Awake, alert, oriented x 4. Affect appropriate. Past Medical History:      Past Medical History:   Diagnosis Date    Arthritis     Hyperlipidemia     Hypertension     Pacemaker 16    Medtronic Dual chamber implanted per Christina Laura for Asystole for 6 seconds (SSS & intermittent high grade AVB).  SSS (sick sinus syndrome) ([facility] [address]      PSHX:  has a past surgical history that includes Appendectomy; knee surgery; hip surgery; back surgery; and Pacemaker insertion (16). Allergies: No Known Allergies    FAM HX: parents ; no known health problems  Soc HX:   Social History     Socioeconomic History    Marital status:       Spouse name: None    Number of children: None    Years of education: None    Highest education level: None   Occupational History    None   Social Needs    Financial resource strain: None    Food insecurity:     Worry: None     Inability: None    Transportation needs:     Medical: None     Non-medical: None   Tobacco Use    Smoking status: Never Smoker    Smokeless tobacco: Never Used   Substance and Sexual Activity    Alcohol use: No    Drug use: No    Sexual activity: Never   Lifestyle    (ZOCOR) 20 MG tablet Take 20 mg by mouth nightly Historical MD Sole Needs Review   betamethasone valerate (VALISONE) 0.1 % cream Apply topically as needed Apply topically 2 times daily. Buddy Whipple MD Needs Review   gabapentin (NEURONTIN) 100 MG capsule Take 100 mg by mouth 3 times daily as needed. Buddy Whipple MD Needs Review   halobetasol (ULTRAVATE) 0.05 % cream Apply topically as needed Apply topically 2 times daily. Buddy Whipple MD Needs Review   magnesium citrate solution Take 296 mLs by mouth once for 1 dose Svetlana Mancia DO Needs Review   FIBER COMPLETE PO Take by mouth 2 times daily Buddy Whipple MD Needs Review   Acetaminophen 650 MG TABS Take 650 mg by mouth every 6 hours as needed Cynthia Hauser MD Needs Review       Data:   CT ABDOMEN PELVIS W IV CONTRAST [[id number]] Collected: 08/18/19 1042      Order Status: Completed Updated: 08/18/19 1053     Narrative:       EXAMINATION:  CT OF THE ABDOMEN AND PELVIS WITH CONTRAST 8/18/2019 10:16 am    TECHNIQUE:  CT of the abdomen and pelvis was performed with the administration of  intravenous contrast. Multiplanar reformatted images are provided for review. Dose modulation, iterative reconstruction, and/or weight based adjustment of  the mA/kV was utilized to reduce the radiation dose to as low as reasonably  achievable. COMPARISON:  CT abdomen and pelvis 11/18/2018    HISTORY:  ORDERING SYSTEM PROVIDED HISTORY: abd pain fall  TECHNOLOGIST PROVIDED HISTORY:  Reason for Exam: abd pain fall  Acuity: Acute  Type of Exam: Initial  Additional signs and symptoms: none  Relevant Medical/Surgical History: 75ml isovue 370/ gfr>60//appy,back and hip  surgery    FINDINGS:  Lower Chest: Subsegmental atelectasis in the dependent portions of the lungs. Organs: The liver, gallbladder, spleen, pancreas, adrenal glands, and kidneys  demonstrate no acute abnormality.     GI/Bowel: A gastric diverticulum is seen at the fundus, also at the [facility].  No acute fracture.     Impression:       No acute fracture.     XR CHEST PORTABLE [[id number]] Collected: 08/18/19 0943     Order Status: Completed Updated: 08/18/19 0948     Narrative:       EXAMINATION:  ONE XRAY VIEW OF THE CHEST    8/18/2019 9:08 am    COMPARISON:  06/10/2016, 1331 hours    HISTORY:  ORDERING SYSTEM PROVIDED HISTORY: fall  TECHNOLOGIST PROVIDED HISTORY:  Reason for exam:->fall  Reason for Exam: fall  Acuity: Unknown  Type of Exam: Unknown    43-year-old female with history of fall    FINDINGS:  Portable upright view of the chest.    Left subclavian approach cardiac pacemaker device distal lead tip stable in  position. Cardiac monitor leads overlie the chest.    Cardiac and mediastinal contours unchanged and within normal limits. No pneumothorax.  No free air.  Underlying COPD.  Mild left basilar  atelectasis and scarring.  No acute focal airspace consolidation or sizable  pleural effusions.  Diffuse osteopenia.  Visualized osseous structures remain  unchanged.  Calcific tendinitis at the left shoulder.  Atherosclerotic  calcification of the aortic knob.     Impression:       1. Mild left basilar atelectasis and scarring.  Underlying COPD. 2. No acute focal airspace consolidation.   3. Diffuse osteopenia.  Calcific tendinitis at the left shoulder.         Atrial-paced rhythm with prolonged AV conduction with occasional supraventricular complexes and with frequent premature ventricular complexes   Abnormal ECG   No previous ECGs available     Electronically signed by YOANA Johnson NP on 8/18/2019 at 12:14 PM

## 2019-08-18 NOTE — ED PROVIDER NOTES
Take 1 tablet by mouth daily 30 tablet 3    Multiple Vitamins-Minerals (ICAPS AREDS 2 PO) Take 2 capsules by mouth daily       docusate sodium (COLACE) 100 MG capsule Take 1 capsule by mouth 2 times daily. 30 capsule 0    MULTIPLE VITAMIN PO Take 1 tablet by mouth daily       polyethylene glycol (MIRALAX) powder Take 17 g by mouth daily as needed       benzonatate (TESSALON) 200 MG capsule Take 200 mg by mouth 3 times daily as needed for Cough      cetirizine (ZYRTEC) 10 MG tablet Take 10 mg by mouth as needed for Allergies      Methylcellulose, Laxative, (CITRUCEL PO) Take 1 tablet by mouth 2 times daily as needed       FIBER COMPLETE PO Take by mouth 2 times daily      Acetaminophen 650 MG TABS Take 650 mg by mouth every 6 hours as needed 120 tablet 3       ALLERGIES    No Known Allergies    SOCIAL & FAMILY HISTORY    Social History     Socioeconomic History    Marital status:       Spouse name: None    Number of children: None    Years of education: None    Highest education level: None   Occupational History    None   Social Needs    Financial resource strain: None    Food insecurity:     Worry: None     Inability: None    Transportation needs:     Medical: None     Non-medical: None   Tobacco Use    Smoking status: Never Smoker    Smokeless tobacco: Never Used   Substance and Sexual Activity    Alcohol use: No    Drug use: No    Sexual activity: Never   Lifestyle    Physical activity:     Days per week: None     Minutes per session: None    Stress: None   Relationships    Social connections:     Talks on phone: None     Gets together: None     Attends Synagogue service: None     Active member of club or organization: None     Attends meetings of clubs or organizations: None     Relationship status: None    Intimate partner violence:     Fear of current or ex partner: None     Emotionally abused: None     Physically abused: None     Forced sexual activity: None   Other Topics updated on tetanus. Chest x-ray shows mild left basilar atelectasis and scarring, no acute consolidation. I discussed labs and imaging with patient and family members. I believe patient require admission for further evaluation and treatment and to confusion. Patient's blood pressure is elevated to 201/105, she has not taken her home medications yet. I believe patient requires swallow study prior to oral medications being given. Consult is made to hospitalist who accepts admission. Clinical  IMPRESSION    1. Altered mental status, unspecified altered mental status type    2. Injury of head, initial encounter    3. Pain of upper abdomen    4. Elevated lipase          PLAN:   Admission to the hospital      Comment: Please note this report has been produced using speech recognition software and may contain errors related to that system including errors in grammar, punctuation, and spelling, as well as words and phrases that may be inappropriate. If there are any questions or concerns please feel free to contact the dictating provider for clarification.       Teom Sevilla PA-C  08/18/19 [id number]

## 2019-08-18 NOTE — LETTER
suppliers that help patients recover after discharge from the hospital, including skilled nursing facilities, home health agencies, inpatient rehabilitation facilities, and long term care hospitals. [facility] is working closely with the doctors and other health care providers that care for you during and following your hospital stay and for a period of time after you leave the hospital. By working together, the health care providers are trying to more efficiently provide well-managed, high quality, patient-centered care as you undergo treatment. Hospitals, doctors, and other health care providers that care for you following a hospital stay may receive an additional payment for providing better, more coordinated health care. Medicare will monitor your care to make sure you and others get high quality care. Your feedback is important     Medicare may also ask you to answer a survey about the services and care you received from [facility]. The survey will be mailed to you. Your feedback will improve care for all people with Medicare who receive care from [facility]. Completion of this survey is optional.     Get more information     For more information about the Bundled Payments for [address] [place] [address], you can:    · Visit the CMS BPCI Advanced Website at http://gould-staley.net/ initiatives/bpci-advanced   · Call the [facility] BPCI-A team at ([phone]. · Call 1-800-MEDICARE (0-606.792.3310). TTY users can call 5-698.840.5509     If you have concerns or complaints about your care, talk to your health care provider, or contact your Beneficiary and Family Centered Quality Improvement Organization [facility]). To get your BFCC-QIO's phone number, visit Medicare.gov/contacts or call 1-800-MEDICARE.

## 2019-08-19 ENCOUNTER — APPOINTMENT (OUTPATIENT)
Dept: MRI IMAGING | Age: 84
DRG: 064 | End: 2019-08-19
Payer: MEDICARE

## 2019-08-19 ENCOUNTER — APPOINTMENT (OUTPATIENT)
Dept: CT IMAGING | Age: 84
DRG: 064 | End: 2019-08-19
Payer: MEDICARE

## 2019-08-19 LAB
ANION GAP SERPL CALCULATED.3IONS-SCNC: 12 MMOL/L (ref 4–16)
BACTERIA: ABNORMAL /HPF
BILIRUBIN URINE: NEGATIVE MG/DL
BLOOD, URINE: ABNORMAL
BUN BLDV-MCNC: 18 MG/DL (ref 6–23)
CALCIUM SERPL-MCNC: 9.2 MG/DL (ref 8.3–10.6)
CHLORIDE BLD-SCNC: 99 MMOL/L (ref 99–110)
CHOLESTEROL: 249 MG/DL
CLARITY: ABNORMAL
CO2: 27 MMOL/L (ref 21–32)
COLOR: YELLOW
CREAT SERPL-MCNC: 0.8 MG/DL (ref 0.6–1.1)
CULTURE: NORMAL
ESTIMATED AVERAGE GLUCOSE: 111 MG/DL
GFR AFRICAN AMERICAN: >60 ML/MIN/1.73M2
GFR NON-AFRICAN AMERICAN: >60 ML/MIN/1.73M2
GLUCOSE BLD-MCNC: 108 MG/DL (ref 70–99)
GLUCOSE BLD-MCNC: 116 MG/DL (ref 70–99)
GLUCOSE, URINE: NEGATIVE MG/DL
HBA1C MFR BLD: 5.5 % (ref 4.2–6.3)
HCT VFR BLD CALC: 44.6 % (ref 37–47)
HDLC SERPL-MCNC: 52 MG/DL
HEMOGLOBIN: 14.7 GM/DL (ref 12.5–16)
HYALINE CASTS: 2 /LPF
KETONES, URINE: ABNORMAL MG/DL
LDL CHOLESTEROL DIRECT: 186 MG/DL
LEUKOCYTE ESTERASE, URINE: ABNORMAL
LV EF: 58 %
LVEF MODALITY: NORMAL
Lab: NORMAL
MAGNESIUM: 1.8 MG/DL (ref 1.8–2.4)
MCH RBC QN AUTO: 30 PG (ref 27–31)
MCHC RBC AUTO-ENTMCNC: 33 % (ref 32–36)
MCV RBC AUTO: 91 FL (ref 78–100)
MUCUS: ABNORMAL HPF
NITRITE URINE, QUANTITATIVE: NEGATIVE
PDW BLD-RTO: 13.2 % (ref 11.7–14.9)
PH, URINE: 8 (ref 5–8)
PLATELET # BLD: 234 K/CU MM (ref 140–440)
PMV BLD AUTO: 10.5 FL (ref 7.5–11.1)
POTASSIUM SERPL-SCNC: 3.3 MMOL/L (ref 3.5–5.1)
PROTEIN UA: 100 MG/DL
RBC # BLD: 4.9 M/CU MM (ref 4.2–5.4)
RBC URINE: 355 /HPF (ref 0–6)
SODIUM BLD-SCNC: 138 MMOL/L (ref 135–145)
SPECIFIC GRAVITY UA: 1.01 (ref 1–1.03)
SPECIMEN: NORMAL
TRICHOMONAS: ABNORMAL /HPF
TRIGL SERPL-MCNC: 134 MG/DL
UROBILINOGEN, URINE: NORMAL MG/DL (ref 0.2–1)
WBC # BLD: 10.7 K/CU MM (ref 4–10.5)
WBC CLUMP: ABNORMAL /HPF
WBC UA: 44 /HPF (ref 0–5)

## 2019-08-19 PROCEDURE — 83721 ASSAY OF BLOOD LIPOPROTEIN: CPT

## 2019-08-19 PROCEDURE — 2580000003 HC RX 258: Performed by: NURSE PRACTITIONER

## 2019-08-19 PROCEDURE — 6360000002 HC RX W HCPCS: Performed by: NURSE PRACTITIONER

## 2019-08-19 PROCEDURE — 97535 SELF CARE MNGMENT TRAINING: CPT

## 2019-08-19 PROCEDURE — 6370000000 HC RX 637 (ALT 250 FOR IP): Performed by: FAMILY MEDICINE

## 2019-08-19 PROCEDURE — 6370000000 HC RX 637 (ALT 250 FOR IP): Performed by: PSYCHIATRY & NEUROLOGY

## 2019-08-19 PROCEDURE — 93306 TTE W/DOPPLER COMPLETE: CPT

## 2019-08-19 PROCEDURE — 83036 HEMOGLOBIN GLYCOSYLATED A1C: CPT

## 2019-08-19 PROCEDURE — 97530 THERAPEUTIC ACTIVITIES: CPT

## 2019-08-19 PROCEDURE — 80061 LIPID PANEL: CPT

## 2019-08-19 PROCEDURE — 92610 EVALUATE SWALLOWING FUNCTION: CPT | Performed by: SPEECH-LANGUAGE PATHOLOGIST

## 2019-08-19 PROCEDURE — 2500000003 HC RX 250 WO HCPCS: Performed by: INTERNAL MEDICINE

## 2019-08-19 PROCEDURE — 36415 COLL VENOUS BLD VENIPUNCTURE: CPT

## 2019-08-19 PROCEDURE — 97167 OT EVAL HIGH COMPLEX 60 MIN: CPT

## 2019-08-19 PROCEDURE — 2580000003 HC RX 258: Performed by: INTERNAL MEDICINE

## 2019-08-19 PROCEDURE — 6370000000 HC RX 637 (ALT 250 FOR IP): Performed by: NURSE PRACTITIONER

## 2019-08-19 PROCEDURE — 70450 CT HEAD/BRAIN W/O DYE: CPT

## 2019-08-19 PROCEDURE — 80048 BASIC METABOLIC PNL TOTAL CA: CPT

## 2019-08-19 PROCEDURE — 82962 GLUCOSE BLOOD TEST: CPT

## 2019-08-19 PROCEDURE — 97162 PT EVAL MOD COMPLEX 30 MIN: CPT

## 2019-08-19 PROCEDURE — 81001 URINALYSIS AUTO W/SCOPE: CPT

## 2019-08-19 PROCEDURE — 70551 MRI BRAIN STEM W/O DYE: CPT

## 2019-08-19 PROCEDURE — 1200000000 HC SEMI PRIVATE

## 2019-08-19 PROCEDURE — 83735 ASSAY OF MAGNESIUM: CPT

## 2019-08-19 PROCEDURE — 70544 MR ANGIOGRAPHY HEAD W/O DYE: CPT

## 2019-08-19 PROCEDURE — 97116 GAIT TRAINING THERAPY: CPT

## 2019-08-19 PROCEDURE — 85027 COMPLETE CBC AUTOMATED: CPT

## 2019-08-19 RX ORDER — LISINOPRIL 20 MG/1
20 TABLET ORAL DAILY
Status: DISCONTINUED | OUTPATIENT
Start: 2019-08-20 | End: 2019-08-23 | Stop reason: HOSPADM

## 2019-08-19 RX ORDER — DEXTROSE AND SODIUM CHLORIDE 5; .9 G/100ML; G/100ML
INJECTION, SOLUTION INTRAVENOUS CONTINUOUS
Status: DISCONTINUED | OUTPATIENT
Start: 2019-08-19 | End: 2019-08-19

## 2019-08-19 RX ORDER — POTASSIUM CHLORIDE 29.8 MG/ML
20 INJECTION INTRAVENOUS PRN
Status: DISCONTINUED | OUTPATIENT
Start: 2019-08-19 | End: 2019-08-23 | Stop reason: HOSPADM

## 2019-08-19 RX ORDER — POTASSIUM CHLORIDE 20 MEQ/1
20 TABLET, EXTENDED RELEASE ORAL 2 TIMES DAILY WITH MEALS
Status: DISPENSED | OUTPATIENT
Start: 2019-08-19 | End: 2019-08-20

## 2019-08-19 RX ORDER — POTASSIUM CHLORIDE 7.45 MG/ML
10 INJECTION INTRAVENOUS
Status: DISPENSED | OUTPATIENT
Start: 2019-08-19 | End: 2019-08-19

## 2019-08-19 RX ORDER — POTASSIUM CHLORIDE 7.45 MG/ML
10 INJECTION INTRAVENOUS PRN
Status: DISCONTINUED | OUTPATIENT
Start: 2019-08-19 | End: 2019-08-19

## 2019-08-19 RX ORDER — POTASSIUM CHLORIDE 7.45 MG/ML
10 INJECTION INTRAVENOUS PRN
Status: DISCONTINUED | OUTPATIENT
Start: 2019-08-19 | End: 2019-08-23 | Stop reason: HOSPADM

## 2019-08-19 RX ORDER — CALCIUM POLYCARBOPHIL 625 MG 625 MG/1
625 TABLET ORAL DAILY
Status: DISCONTINUED | OUTPATIENT
Start: 2019-08-19 | End: 2019-08-23 | Stop reason: HOSPADM

## 2019-08-19 RX ORDER — CLOPIDOGREL BISULFATE 75 MG/1
75 TABLET ORAL DAILY
Status: DISCONTINUED | OUTPATIENT
Start: 2019-08-19 | End: 2019-08-23 | Stop reason: HOSPADM

## 2019-08-19 RX ORDER — MEMANTINE HYDROCHLORIDE 5 MG/1
5 TABLET ORAL DAILY
Status: DISCONTINUED | OUTPATIENT
Start: 2019-08-20 | End: 2019-08-21

## 2019-08-19 RX ORDER — POTASSIUM CHLORIDE 20 MEQ/1
40 TABLET, EXTENDED RELEASE ORAL PRN
Status: DISCONTINUED | OUTPATIENT
Start: 2019-08-19 | End: 2019-08-23 | Stop reason: HOSPADM

## 2019-08-19 RX ORDER — SODIUM CHLORIDE 9 MG/ML
INJECTION, SOLUTION INTRAVENOUS CONTINUOUS
Status: DISCONTINUED | OUTPATIENT
Start: 2019-08-19 | End: 2019-08-19

## 2019-08-19 RX ORDER — HYDROCHLOROTHIAZIDE 25 MG/1
25 TABLET ORAL DAILY
Status: DISCONTINUED | OUTPATIENT
Start: 2019-08-20 | End: 2019-08-23 | Stop reason: HOSPADM

## 2019-08-19 RX ORDER — HALOPERIDOL 5 MG/ML
2 INJECTION INTRAMUSCULAR ONCE
Status: DISCONTINUED | OUTPATIENT
Start: 2019-08-19 | End: 2019-08-19

## 2019-08-19 RX ORDER — POTASSIUM CHLORIDE 1.5 G/1.77G
40 POWDER, FOR SOLUTION ORAL PRN
Status: DISCONTINUED | OUTPATIENT
Start: 2019-08-19 | End: 2019-08-23 | Stop reason: HOSPADM

## 2019-08-19 RX ADMIN — ENOXAPARIN SODIUM 30 MG: 30 INJECTION SUBCUTANEOUS at 09:52

## 2019-08-19 RX ADMIN — POTASSIUM CHLORIDE 10 MEQ: 10 INJECTION, SOLUTION INTRAVENOUS at 09:44

## 2019-08-19 RX ADMIN — POTASSIUM CHLORIDE 10 MEQ: 10 INJECTION, SOLUTION INTRAVENOUS at 18:06

## 2019-08-19 RX ADMIN — POTASSIUM CHLORIDE 20 MEQ: 20 TABLET, EXTENDED RELEASE ORAL at 18:05

## 2019-08-19 RX ADMIN — DEXTROSE AND SODIUM CHLORIDE: 5; 900 INJECTION, SOLUTION INTRAVENOUS at 06:37

## 2019-08-19 RX ADMIN — SODIUM CHLORIDE, PRESERVATIVE FREE 10 ML: 5 INJECTION INTRAVENOUS at 21:45

## 2019-08-19 RX ADMIN — ASPIRIN 81 MG: 81 TABLET, COATED ORAL at 11:02

## 2019-08-19 RX ADMIN — POLYETHYLENE GLYCOL 3350 17 G: 17 POWDER, FOR SOLUTION ORAL at 11:02

## 2019-08-19 RX ADMIN — POTASSIUM CHLORIDE 10 MEQ: 10 INJECTION, SOLUTION INTRAVENOUS at 18:05

## 2019-08-19 RX ADMIN — FAMOTIDINE 20 MG: 20 TABLET ORAL at 11:03

## 2019-08-19 RX ADMIN — POTASSIUM CHLORIDE 10 MEQ: 10 INJECTION, SOLUTION INTRAVENOUS at 19:38

## 2019-08-19 RX ADMIN — DOCUSATE SODIUM 100 MG: 100 CAPSULE, LIQUID FILLED ORAL at 11:03

## 2019-08-19 RX ADMIN — CLOPIDOGREL BISULFATE 75 MG: 75 TABLET ORAL at 18:05

## 2019-08-19 RX ADMIN — LABETALOL HYDROCHLORIDE 100 MG: 100 TABLET, FILM COATED ORAL at 11:03

## 2019-08-19 RX ADMIN — HYDRALAZINE HYDROCHLORIDE 10 MG: 20 INJECTION INTRAMUSCULAR; INTRAVENOUS at 03:56

## 2019-08-19 RX ADMIN — METOPROLOL TARTRATE 5 MG: 1 INJECTION, SOLUTION INTRAVENOUS at 03:16

## 2019-08-19 RX ADMIN — LABETALOL HYDROCHLORIDE 100 MG: 100 TABLET, FILM COATED ORAL at 21:44

## 2019-08-19 NOTE — PROGRESS NOTES
Repeat NIH- 2 hours done. Pt is more confused now and not answering any questions appropriately other than her name. Other than that she has no new deficits from previous NIH. She also failed her swallow eval. First she refused to drink the water and then only took a small sip- which she coughed afterwards. Notified hospitalist- stat CT and NPO order obtained.

## 2019-08-19 NOTE — PROGRESS NOTES
solid;Dysphagia Pureed (Dysphagia I); Thin - straw; Thin - cup; Thin - teaspoon           Vision/Hearing  Hearing  Hearing: Exceptions to [facility]  Hearing Exceptions: Bilateral hearing aid    Oral Motor Deficits  Oral/Motor  Oral Motor: Exceptions to [facility]  Labial ROM: Reduced right  Labial Symmetry: Abnormal symmetry right  Lingual Strength: Reduced  Lingual Coordination: Reduced    Oral Phase Dysfunction  Oral Phase  Oral Phase: Exceptions(oral holding without cues)     Indicators of Pharyngeal Phase Dysfunction   Pharyngeal Phase  Pharyngeal Phase: Exceptions  Indicators of Pharyngeal Phase Dysfunction  Delayed Swallow: Thin - cup; Thin - straw    Prognosis  Prognosis  Prognosis for safe diet advancement: good  Barriers to reach goals: cognitive deficits  Barriers/Prognosis Comment: possible CVA  Individuals consulted  Consulted and agree with results and recommendations: Family member;RN  Family member consulted: son/dtr    Education  Patient Education: results and recommendations presented to pt  Patient Education Response: Needs reinforcement; No evidence of learning(family demonstrated understanding)  Safety Devices in place: Yes  Type of devices: All fall risk precautions in place; Left in chair    G-Code            Therapy Time  SLP Individual Minutes  Time In: 0930  Time Out: 1010  Minutes: [address] [place], [place]  8/19/2019 10:16 AM

## 2019-08-19 NOTE — PLAN OF CARE
1308 by Gibran Ledbetter RN  Outcome: Ongoing     Problem: Risk for Impaired Skin Integrity  Goal: Tissue integrity - skin and mucous membranes  Description  Structural intactness and normal physiological function of skin and  mucous membranes.   Outcome: Ongoing

## 2019-08-19 NOTE — PROGRESS NOTES
hemineglect but turns head to scan when cued. · Hearing:  Hard of hearing. · ROM:  WFL BUE  · Strength: no unilateral weakness c formal testing, 4/5 BUE with formal testing. But RUE is weak in funtion  · Sensation: not tested formally  · Motor apraxia of RUE    ADLs  Feeding: Setup/SBA    Grooming: Pt washed hands sinkside c SBA/cues to initiate. CGA for steadying c 0-1 UE support. Dressing: UB predict Neville in seated LB predict modA (for threading, shoes, socks); pt managed hiking up/down of brief    Bathing: UB predict Neville in seated LB predict modA    Toileting: ModA. Assisted with hiking backside only of brief and buttock hygiene. Pt attempted buttock hygiene in seated but was unable. *Some ADL determined per observation of actual ADL performance, functional mobility, balance, activity tolerance, and cognition. AM-PAC 6 click short form for inpatient daily activity:  Raw Score: 16  24/24 = unimpaired  23/24 = 1-20% impaired   20/24-22/24 = 21-40% impaired  15/24-19/24 = 41-59% impaired   10/24-14/24 = 60%-79% impaired  7/24-9/24 = 80%-99% impaired  6/24 = 100% impaired    Functional Mobility  Bed mobility:   Supine to sit: not tested    Sitting balance: Supervision    Transfers:   Sit to stand: ModA to power up and to initially steady when hands initially transitioned to walker  Stand to sit: [address]  Completed sit<>stands at chair and low commode c VC/TC for safety/techinuqe    Standing balance: ModA initially when first coming to stand and transitioning hands to walker, progresses to CGA c 1-2 UE support while completing ADL    Ambulation:  Neville progressing to CGA c RW     Activity tolerance  Fair+. Below her baseline.      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 Her work up was positive for CVA (Acute lacunar infarct within the left lentiform nucleus). At baseline, she is modified independent with dressing, toileting, gait c cane, but family reports she has been having inc difficulty with sit>stands. She is now presenting with above impairments due to stroke. She will need continued OT during LOS and at d/c to restore functional independence. Goals:  By d/c or goals met:     Pt will perform all bed mobility with Praveen in prep for EOB/OOB activity. Pt will perform all functional transfers with Praveen and appropriate use of LRD to bed, toilet, chair in prep for increased functional independence. Pt will perform UB ADLs with CGA to increase functional independence. Pt will perform LB ADLs with Praveen to increase functional independence. Pt will perform all aspects of toileting with Praveen to increase functional independence. Pt will participate in therex/therax c emphasis on strength, activity tolerance,  safety, CATALINO tasks. Plan:  Plan  Times per week: 2+x      Recommendation for activity with nursing staff:  up to chair for meals  ambulate to bathroom with RW for toileting    Treatment today:    Self Care Training:   Self care training was performed today. Cues were given for safety, sequence, UE/LE placement, visual cues, and balance. Activities performed today included dressing, toileting, hand hygiene, and grooming. Therapeutic Activity Training:   Therapeutic activity training was instructed today. Cues were given for safety, sequence, UE/LE placement, visual cues, and balance.     Activities performed today included bed mobility training,

## 2019-08-19 NOTE — PROGRESS NOTES
Bedside NIH done at handoff. Pt scored 7 and was previously scored a 1. Notified the hospitalist. Per the family, all of the deficits noted during this assessment have been present for the past 3 days. Per the ER physicians notes these were also present upon time of presentation to the ER. Spoke with hospitalist who advised to do another NIH 2 hours from last assessment.

## 2019-08-19 NOTE — PROGRESS NOTES
80years old female admitted with altered mental status and a questionable CVA. CT head on presentation withoout acute intracranial abnormalities and with an admission NIHSS of 3. Patient's NIH stroke scale progressively increased to 11 overnight. Repeat ct head :     Question relative high density in the left MCA within the anterior sylvian   fissure region.  This is likely artifactual, however if the patient's   clinical symptoms continue, consider CT angiogram to assess for thrombus.       No acute abnormality otherwise     Patient is responsive, mentions her name but appears lethargic. She states she cannot answer NIHSS questions per nursing yielding a higher score. MRI/MRA pending. Neurology consulted. Will continue to monitor. Glucose of 108 with morning labs. D5ND ordered. K replacement ordered.

## 2019-08-20 LAB
FOLATE: >20 NG/ML (ref 3.1–17.5)
HOMOCYSTEINE: ABNORMAL UMOL/L (ref 0–10)
TSH HIGH SENSITIVITY: 3.44 UIU/ML (ref 0.27–4.2)
VITAMIN B-12: 515.6 PG/ML (ref 211–911)

## 2019-08-20 PROCEDURE — 97116 GAIT TRAINING THERAPY: CPT

## 2019-08-20 PROCEDURE — 1200000000 HC SEMI PRIVATE

## 2019-08-20 PROCEDURE — 6370000000 HC RX 637 (ALT 250 FOR IP): Performed by: FAMILY MEDICINE

## 2019-08-20 PROCEDURE — 6360000002 HC RX W HCPCS: Performed by: NURSE PRACTITIONER

## 2019-08-20 PROCEDURE — 94761 N-INVAS EAR/PLS OXIMETRY MLT: CPT

## 2019-08-20 PROCEDURE — 36415 COLL VENOUS BLD VENIPUNCTURE: CPT

## 2019-08-20 PROCEDURE — 84443 ASSAY THYROID STIM HORMONE: CPT

## 2019-08-20 PROCEDURE — 2580000003 HC RX 258: Performed by: NURSE PRACTITIONER

## 2019-08-20 PROCEDURE — 97112 NEUROMUSCULAR REEDUCATION: CPT

## 2019-08-20 PROCEDURE — 6370000000 HC RX 637 (ALT 250 FOR IP): Performed by: PSYCHIATRY & NEUROLOGY

## 2019-08-20 PROCEDURE — 6370000000 HC RX 637 (ALT 250 FOR IP): Performed by: NURSE PRACTITIONER

## 2019-08-20 PROCEDURE — 2580000003 HC RX 258: Performed by: INTERNAL MEDICINE

## 2019-08-20 PROCEDURE — 97530 THERAPEUTIC ACTIVITIES: CPT

## 2019-08-20 PROCEDURE — 82746 ASSAY OF FOLIC ACID SERUM: CPT

## 2019-08-20 PROCEDURE — 6360000002 HC RX W HCPCS: Performed by: INTERNAL MEDICINE

## 2019-08-20 PROCEDURE — 82607 VITAMIN B-12: CPT

## 2019-08-20 PROCEDURE — 2500000003 HC RX 250 WO HCPCS: Performed by: INTERNAL MEDICINE

## 2019-08-20 PROCEDURE — 83090 ASSAY OF HOMOCYSTEINE: CPT

## 2019-08-20 PROCEDURE — 97164 PT RE-EVAL EST PLAN CARE: CPT

## 2019-08-20 RX ORDER — CLONIDINE HYDROCHLORIDE 0.1 MG/1
0.1 TABLET ORAL 4 TIMES DAILY PRN
Status: DISCONTINUED | OUTPATIENT
Start: 2019-08-20 | End: 2019-08-23 | Stop reason: HOSPADM

## 2019-08-20 RX ORDER — RIVASTIGMINE 4.6 MG/24H
1 PATCH, EXTENDED RELEASE TRANSDERMAL DAILY
Status: DISCONTINUED | OUTPATIENT
Start: 2019-08-20 | End: 2019-08-21

## 2019-08-20 RX ORDER — ENALAPRILAT 2.5 MG/2ML
1.25 INJECTION INTRAVENOUS ONCE
Status: COMPLETED | OUTPATIENT
Start: 2019-08-20 | End: 2019-08-20

## 2019-08-20 RX ADMIN — DOCUSATE SODIUM 100 MG: 100 CAPSULE, LIQUID FILLED ORAL at 22:32

## 2019-08-20 RX ADMIN — SODIUM CHLORIDE, PRESERVATIVE FREE 10 ML: 5 INJECTION INTRAVENOUS at 08:32

## 2019-08-20 RX ADMIN — LABETALOL HYDROCHLORIDE 100 MG: 100 TABLET, FILM COATED ORAL at 22:31

## 2019-08-20 RX ADMIN — HYDRALAZINE HYDROCHLORIDE 10 MG: 20 INJECTION INTRAMUSCULAR; INTRAVENOUS at 03:13

## 2019-08-20 RX ADMIN — LISINOPRIL 20 MG: 20 TABLET ORAL at 08:10

## 2019-08-20 RX ADMIN — ENALAPRILAT 1.25 MG: 1.25 INJECTION INTRAVENOUS at 02:13

## 2019-08-20 RX ADMIN — ENOXAPARIN SODIUM 30 MG: 30 INJECTION SUBCUTANEOUS at 08:18

## 2019-08-20 RX ADMIN — HYDROCHLOROTHIAZIDE 25 MG: 25 TABLET ORAL at 08:14

## 2019-08-20 RX ADMIN — FAMOTIDINE 20 MG: 20 TABLET ORAL at 08:17

## 2019-08-20 RX ADMIN — CLOPIDOGREL BISULFATE 75 MG: 75 TABLET ORAL at 08:15

## 2019-08-20 RX ADMIN — CALCIUM POLYCARBOPHIL 625 MG TABLET 625 MG: at 08:20

## 2019-08-20 RX ADMIN — MEMANTINE HYDROCHLORIDE 5 MG: 5 TABLET ORAL at 08:16

## 2019-08-20 RX ADMIN — ASPIRIN 81 MG: 81 TABLET, COATED ORAL at 08:14

## 2019-08-20 RX ADMIN — PRAVASTATIN SODIUM 10 MG: 10 TABLET ORAL at 22:32

## 2019-08-20 RX ADMIN — LABETALOL HYDROCHLORIDE 100 MG: 100 TABLET, FILM COATED ORAL at 08:12

## 2019-08-20 RX ADMIN — CEFTRIAXONE 1 G: 1 INJECTION, POWDER, FOR SOLUTION INTRAMUSCULAR; INTRAVENOUS at 03:32

## 2019-08-20 RX ADMIN — SODIUM CHLORIDE, PRESERVATIVE FREE 10 ML: 5 INJECTION INTRAVENOUS at 22:32

## 2019-08-20 RX ADMIN — DOCUSATE SODIUM 100 MG: 100 CAPSULE, LIQUID FILLED ORAL at 08:19

## 2019-08-20 ASSESSMENT — PAIN SCALES - GENERAL
PAINLEVEL_OUTOF10: 0
PAINLEVEL_OUTOF10: 0

## 2019-08-20 NOTE — PROGRESS NOTES
CARDIO/VASC S1/S2 auscultated. Regular rate without appreciable murmurs, rubs, or gallops. No peripheral edema. GI Abdomen is soft without significant tenderness, masses, or guarding. Bowel sounds are normoactive. MSK No gross joint deformities. Spontaneous movement of all extremities  SKIN Normal coloration, warm, dry. NEURO Grossly non focal  PSYCH Awake, alert, oriented x 2. Affect appropriate.     Medications:   Medications:    hydrALAZINE (APRESOLINE) ivpb  10 mg Intravenous Q6H    cefTRIAXone (ROCEPHIN) IV  1 g Intravenous Q24H    hydrochlorothiazide  25 mg Oral Daily    lisinopril  20 mg Oral Daily    clopidogrel  75 mg Oral Daily    memantine  5 mg Oral Daily    polycarbophil  625 mg Oral Daily    sodium chloride flush  10 mL Intravenous 2 times per day    enoxaparin  30 mg Subcutaneous Daily    aspirin  81 mg Oral Daily    Or    aspirin  300 mg Rectal Daily    famotidine  20 mg Oral Daily    docusate sodium  100 mg Oral BID    labetalol  100 mg Oral BID    pravastatin  10 mg Oral Nightly      Infusions:   PRN Meds:   cloNIDine 0.1 mg 4x Daily PRN   potassium chloride 40 mEq PRN   Or     potassium alternative oral replacement 40 mEq PRN   potassium chloride 10 mEq PRN   potassium chloride 20 mEq PRN   sodium chloride flush 10 mL PRN   magnesium hydroxide 30 mL Daily PRN   ondansetron 4 mg Q6H PRN   polyethylene glycol 17 g Daily PRN         Electronically signed by Nathaniel Viera MD on 8/20/2019 at 2:47 PM

## 2019-08-20 NOTE — PROGRESS NOTES
PT SON ASK THIS NURSE AT 130PM  IF HIS MOM WILL BE SEEN BY THERAPY TODAY. I ASK MARTIN @ 136PM  TO CALL AND CHECK ON THIS FOR ME. THERAPY DEPARTMENT STATED SHE IS ON THE LIST FOR TODAY, BUT THEY HAVE A VERY LONG LIST. THE ORDER IS FOR 4 TIMES A WEEK AND NOT SPECIFIC DAYS. PT SON LEFT AT 211PM AND PT IS SITTING IN HER CHAIR WITH FEET UP AND CHAIR ALARM ON.

## 2019-08-21 LAB
ANION GAP SERPL CALCULATED.3IONS-SCNC: 13 MMOL/L (ref 4–16)
BUN BLDV-MCNC: 25 MG/DL (ref 6–23)
CALCIUM SERPL-MCNC: 9 MG/DL (ref 8.3–10.6)
CHLORIDE BLD-SCNC: 98 MMOL/L (ref 99–110)
CO2: 21 MMOL/L (ref 21–32)
CREAT SERPL-MCNC: 1.3 MG/DL (ref 0.6–1.1)
GFR AFRICAN AMERICAN: 46 ML/MIN/1.73M2
GFR NON-AFRICAN AMERICAN: 38 ML/MIN/1.73M2
GLUCOSE BLD-MCNC: 116 MG/DL (ref 70–99)
POTASSIUM SERPL-SCNC: 3.4 MMOL/L (ref 3.5–5.1)
SODIUM BLD-SCNC: 132 MMOL/L (ref 135–145)

## 2019-08-21 PROCEDURE — 6360000002 HC RX W HCPCS: Performed by: NURSE PRACTITIONER

## 2019-08-21 PROCEDURE — 6360000002 HC RX W HCPCS: Performed by: INTERNAL MEDICINE

## 2019-08-21 PROCEDURE — 97535 SELF CARE MNGMENT TRAINING: CPT

## 2019-08-21 PROCEDURE — 36415 COLL VENOUS BLD VENIPUNCTURE: CPT

## 2019-08-21 PROCEDURE — 6370000000 HC RX 637 (ALT 250 FOR IP): Performed by: FAMILY MEDICINE

## 2019-08-21 PROCEDURE — 80048 BASIC METABOLIC PNL TOTAL CA: CPT

## 2019-08-21 PROCEDURE — 6370000000 HC RX 637 (ALT 250 FOR IP): Performed by: PSYCHIATRY & NEUROLOGY

## 2019-08-21 PROCEDURE — 6370000000 HC RX 637 (ALT 250 FOR IP): Performed by: NURSE PRACTITIONER

## 2019-08-21 PROCEDURE — 97530 THERAPEUTIC ACTIVITIES: CPT

## 2019-08-21 PROCEDURE — 2580000003 HC RX 258: Performed by: HOSPITALIST

## 2019-08-21 PROCEDURE — 2580000003 HC RX 258: Performed by: INTERNAL MEDICINE

## 2019-08-21 PROCEDURE — 97116 GAIT TRAINING THERAPY: CPT

## 2019-08-21 PROCEDURE — 1200000000 HC SEMI PRIVATE

## 2019-08-21 PROCEDURE — 94761 N-INVAS EAR/PLS OXIMETRY MLT: CPT

## 2019-08-21 PROCEDURE — 2580000003 HC RX 258: Performed by: NURSE PRACTITIONER

## 2019-08-21 PROCEDURE — 97112 NEUROMUSCULAR REEDUCATION: CPT

## 2019-08-21 PROCEDURE — 92526 ORAL FUNCTION THERAPY: CPT | Performed by: SPEECH-LANGUAGE PATHOLOGIST

## 2019-08-21 RX ORDER — SODIUM CHLORIDE 9 MG/ML
INJECTION, SOLUTION INTRAVENOUS CONTINUOUS
Status: DISPENSED | OUTPATIENT
Start: 2019-08-21 | End: 2019-08-22

## 2019-08-21 RX ORDER — MEMANTINE HYDROCHLORIDE 5 MG/1
5 TABLET ORAL 2 TIMES DAILY
Status: DISCONTINUED | OUTPATIENT
Start: 2019-08-21 | End: 2019-08-22

## 2019-08-21 RX ADMIN — ASPIRIN 81 MG: 81 TABLET, COATED ORAL at 09:14

## 2019-08-21 RX ADMIN — CLOPIDOGREL BISULFATE 75 MG: 75 TABLET ORAL at 09:16

## 2019-08-21 RX ADMIN — LABETALOL HYDROCHLORIDE 100 MG: 100 TABLET, FILM COATED ORAL at 21:18

## 2019-08-21 RX ADMIN — LABETALOL HYDROCHLORIDE 100 MG: 100 TABLET, FILM COATED ORAL at 09:15

## 2019-08-21 RX ADMIN — PRAVASTATIN SODIUM 10 MG: 10 TABLET ORAL at 21:18

## 2019-08-21 RX ADMIN — HYDRALAZINE HYDROCHLORIDE 10 MG: 20 INJECTION INTRAMUSCULAR; INTRAVENOUS at 01:49

## 2019-08-21 RX ADMIN — FAMOTIDINE 20 MG: 20 TABLET ORAL at 09:14

## 2019-08-21 RX ADMIN — SODIUM CHLORIDE, PRESERVATIVE FREE 10 ML: 5 INJECTION INTRAVENOUS at 09:18

## 2019-08-21 RX ADMIN — MEMANTINE HYDROCHLORIDE 5 MG: 5 TABLET ORAL at 09:17

## 2019-08-21 RX ADMIN — HYDROCHLOROTHIAZIDE 25 MG: 25 TABLET ORAL at 09:15

## 2019-08-21 RX ADMIN — SODIUM CHLORIDE: 9 INJECTION, SOLUTION INTRAVENOUS at 11:32

## 2019-08-21 RX ADMIN — CEFTRIAXONE 1 G: 1 INJECTION, POWDER, FOR SOLUTION INTRAMUSCULAR; INTRAVENOUS at 03:42

## 2019-08-21 RX ADMIN — DOCUSATE SODIUM 100 MG: 100 CAPSULE, LIQUID FILLED ORAL at 09:16

## 2019-08-21 RX ADMIN — LISINOPRIL 20 MG: 20 TABLET ORAL at 09:15

## 2019-08-21 RX ADMIN — MEMANTINE HYDROCHLORIDE 5 MG: 5 TABLET ORAL at 21:18

## 2019-08-21 RX ADMIN — POTASSIUM CHLORIDE 40 MEQ: 1.5 POWDER, FOR SOLUTION ORAL at 09:15

## 2019-08-21 RX ADMIN — CALCIUM POLYCARBOPHIL 625 MG TABLET 625 MG: at 09:14

## 2019-08-21 RX ADMIN — DOCUSATE SODIUM 100 MG: 100 CAPSULE, LIQUID FILLED ORAL at 21:18

## 2019-08-21 RX ADMIN — ENOXAPARIN SODIUM 30 MG: 30 INJECTION SUBCUTANEOUS at 09:16

## 2019-08-21 NOTE — CARE COORDINATION
[address] Transitions Interview     2019    Patient: Mariza Rodriguez Patient : 1921   MRN: [id number]  Reason for Admission:   stroke  RARS: Readmission Risk Score: 15         Spoke with:   patient      Readmission Risk  Patient Active Problem List   Diagnosis    Atrial fibrillation ([facility] [address]    Hypokalemia    Pacemaker    Obstipation    Altered mental status, unspecified       Inpatient Assessment  Care Transitions Summary    Care Transitions Inpatient Review  Medication Review  Are you able to afford your medications?:  Yes  How often do you have difficulty taking your medications?:  I always take them as prescribed. Housing Review  Who do you live with?:  Child  Social Support  Do you have a ?:  No  Do you have a [facility]?:  No  Durable Medical Equipment  Functional Review  Ability to seek help/take action for Emergent/Urgent situations i.e. fire, crime, inclement weather or health crisis.:  Needs Assistance  Ability handle personal hygiene needs (bathing/dressing/grooming):  Needs Assistance  Ability to manage medications:  Needs Assistance  Ability to prepare food:  Needs Assistance  Ability to maintain home (clean home, laundry):  Needs Assistance  Ability to drive and/or has transportation:  Needs Assistance  Ability to do shopping:  Needs Assistance  Ability to manage finances:  Needs Assistance  Is patient able to live independently?:  No  Hearing and Vision  Visual Impairment:  Reading glasses  Hearing Impairment:  Hard of hearing  Care Transitions Interventions         Follow Up:  Spoke with patient with her son present. Patient is resting in her room. Patient's son reports that prior to admission patient lived with her daughter. Patient was independent with ADL's until recently and utilized a cane for ambulation. No issue with transportation or with the cost of her medication.     Patient's son reports that patient's daughter was being treated
Patient approved for ARU. Awaiting medical stability and bed availability. Will continue to follow.
physician after discharge . Discussed benefits of eating a healthy diet, regularly exercising, and not smoking. Copy of educational materials given to the patient/caregiver to take home, reviewed signs and symptoms of stroke, including sudden numbness, dizziness, or loss of coordination or balance; weakness on one side of the body, sudden confusion, difficulty speaking, understanding or swallowing, sudden loss of vision, or severe, sudden headache. Emphasized the need to call 911 immediately if they are experiencing signs and symptoms of stroke. BEFAST education provided, [facility] magnet given to patient. All education with teachback and questions answered     CN Contact information card given to patient/caregiver    Dysphagia screen done prior to any oral intake (including meds)                      No    Date & Time of screening-8/18 1313 Fransico Severin RN  Date & time of first medication-8/18 1209 Fransico Severin RN  Did the pt fail the dysphagia screen? If yes,call the physician for SLP order, make the patient NPO and change oral medications to other routes  No    VTE Prophylaxis given by day 2 of admission ( day 1 starts on adm date,this excludes obs status and ED)  Yes  If VTE not ordered, did the physician chart BOTH a pharmacological and mechanical reasons ( in context to VTE) why it wan not ordered? NA  Was the patient given an antithrombotic by end of day 2? (day 1 starts on patients arrival date)  Yes  If the pt did not have an order for antithrombotic by day 2, did the physician document why the pt did not receive it? ( NPO status and an allergy to an antithrombotic are not acceptable reasons)  NA    Did the pt receive education on how to call 911 and documented that handout was given to pt/caregiver? Yes  Did the pt receive education on stroke symptoms and documented that handout was given to pt/caregiver?   Yes  Did the patient receive education on risk factors for stroke and documented

## 2019-08-21 NOTE — PROGRESS NOTES
NEUROLOGY NOTE  DR. Greg LOPEZ.  -------------------------------------------------  Subjective:    Pt is somewhat confused this am and her exelon patch was DC'd today    Able to communicate better this afternoon    Pt tolerating namenda today    Objective:    BP (!) 135/51   Pulse 70   Temp 98.3 °F (36.8 °C) (Oral)   Resp 25   Ht 5' 7\" (1.702 m)   Wt 140 lb (63.5 kg)   SpO2 96%   BMI 21.93 kg/m²   HEENT nl      Neuro exam    Alert Oriented  X 2  Follow simple commands  Moderate dementia  EOMI Pupils 3 mm sally  5/5 all 4 extremities      RADIOLOGY  -----------------    Xr Wrist Right (min 3 Views)    Result Date: 8/18/2019  EXAMINATION: 3 XRAY VIEWS OF THE RIGHT WRIST 8/18/2019 9:08 am COMPARISON: None. HISTORY: ORDERING SYSTEM PROVIDED HISTORY: fall TECHNOLOGIST PROVIDED HISTORY: Reason for exam:->fall Reason for Exam: injury;fall Acuity: Acute Type of Exam: Initial FINDINGS: Osteopenia. Mild-to-moderate degenerative changes the CMC joints. Chondrocalcinosis at the TFCC. No acute fracture. No acute fracture. Ct Head Wo Contrast    Result Date: 8/20/2019  EXAMINATION: CT OF THE HEAD WITHOUT CONTRAST  8/19/2019 10:28 pm TECHNIQUE: CT of the head was performed without the administration of intravenous contrast. Dose modulation, iterative reconstruction, and/or weight based adjustment of the mA/kV was utilized to reduce the radiation dose to as low as reasonably achievable. COMPARISON: 08/18/2019 HISTORY: ORDERING SYSTEM PROVIDED HISTORY: Unwitnessed fall TECHNOLOGIST PROVIDED HISTORY: Has a \"code stroke\" or \"stroke alert\" been called? ->No Reason for Exam: Unwitnessed fall Acuity: Acute Type of Exam: Initial Mechanism of Injury: Unwitnessed fall Relevant Medical/Surgical History: none FINDINGS: BRAIN/VENTRICLES: There is no acute intracranial hemorrhage, mass effect or midline shift. No abnormal extra-axial fluid collection. No evidence of recent territorial infarct.   Chronic right basal STRUCTURES/VENTRICLES: The study is degraded by motion artifact. There is an acute lacunar infarct within the left lentiform nucleus. Volume loss is noted with mild chronic white matter microvascular ischemic disease characterized by periventricular white matter signal abnormality. No mass, shift, or bleed is visualized. There is a chronic lacunar infarct within the cerebellum. ORBITS: The visualized portion of the orbits demonstrate no acute abnormality. SINUSES: The visualized paranasal sinuses and mastoid air cells are well aerated. BONES/SOFT TISSUES: The bone marrow signal intensity appears normal. The soft tissues demonstrate no acute abnormality. MRA HEAD: ANTERIOR CIRCULATION: The internal carotid arteries are normal in course and caliber without focal stenosis. The anterior cerebral and middle cerebral arteries demonstrate no focal stenosis. Bilateral posterior communicating arteries are present. POSTERIOR CIRCULATION: The distal vertebral arteries have largely been excluded from view. The basilar artery is very small, probably a normal variation due the presence of prominent posterior communicating arteries. There may be a severe focal stenosis of the mid basilar artery on image number 38 of series 5. The posterior cerebral arteries are normal in course and caliber to the extent visualized. ANEURYSM: No intracranial aneurysm is seen. Acute lacunar infarct within the left lentiform nucleus. No large vessel occlusion detected. Questionable severe focal stenosis of the mid basilar artery versus artifact. The findings were sent to the Radiology Results [address] at 12:47 pm on 8/19/2019to be communicated to a licensed caregiver.      Ct Abdomen Pelvis W Iv Contrast    Result Date: 8/18/2019  EXAMINATION: CT OF THE ABDOMEN AND PELVIS WITH CONTRAST 8/18/2019 10:16 am TECHNIQUE: CT of the abdomen and pelvis was performed with the administration of intravenous contrast. Multiplanar reformatted images are provided for review. Dose modulation, iterative reconstruction, and/or weight based adjustment of the mA/kV was utilized to reduce the radiation dose to as low as reasonably achievable. COMPARISON: CT abdomen and pelvis 11/18/2018 HISTORY: ORDERING SYSTEM PROVIDED HISTORY: abd pain fall TECHNOLOGIST PROVIDED HISTORY: Reason for Exam: abd pain fall Acuity: Acute Type of Exam: Initial Additional signs and symptoms: none Relevant Medical/Surgical History: 75ml isovue 370/ gfr>60//appy,back and hip surgery FINDINGS: Lower Chest: Subsegmental atelectasis in the dependent portions of the lungs. Organs: The liver, gallbladder, spleen, pancreas, adrenal glands, and kidneys demonstrate no acute abnormality. GI/Bowel: A gastric diverticulum is seen at the fundus, also present on the prior study. Diverticulosis. No evidence of a bowel obstruction. Pelvis: The urinary bladder is unremarkable. Peritoneum/Retroperitoneum: Dense atheromatous calcifications of the abdominal aorta and its branches. No intraperitoneal free fluid or free air. No lymphadenopathy. Bones/Soft Tissues: Right total hip prosthesis. S-shaped curvature of the thoracolumbar spine with associated endplate degenerative changes. No acute fracture is identified. No acute abnormality in the abdomen or pelvis. Xr Chest Portable    Result Date: 8/18/2019  EXAMINATION: ONE XRAY VIEW OF THE CHEST 8/18/2019 9:08 am COMPARISON: 06/10/2016, 1331 hours HISTORY: ORDERING SYSTEM PROVIDED HISTORY: fall TECHNOLOGIST PROVIDED HISTORY: Reason for exam:->fall Reason for Exam: fall Acuity: Unknown Type of Exam: Unknown 80-year-old female with history of fall FINDINGS: Portable upright view of the chest. Left subclavian approach cardiac pacemaker device distal lead tip stable in position. Cardiac monitor leads overlie the chest. Cardiac and mediastinal contours unchanged and within normal limits. No pneumothorax. No free air. Underlying COPD.   Mild left basilar atelectasis and scarring. No acute focal airspace consolidation or sizable pleural effusions. Diffuse osteopenia. Visualized osseous structures remain unchanged. Calcific tendinitis at the left shoulder. Atherosclerotic calcification of the aortic knob. 1. Mild left basilar atelectasis and scarring. Underlying COPD. 2. No acute focal airspace consolidation. 3. Diffuse osteopenia. Calcific tendinitis at the left shoulder. Vascular Carotid Duplex Bilateral    Result Date: 8/19/2019  EXAMINATION: ULTRASOUND EVALUATION OF THE CAROTID ARTERIES 8/18/2019 COMPARISON: None. HISTORY: ORDERING SYSTEM PROVIDED HISTORY: TIA/ams workup; slurred speech, confusion TECHNOLOGIST PROVIDED HISTORY: Reason for exam:->TIA/ams workup; slurred speech, confusion Reason for Exam: AMS. Confusion. TIA Acuity: Acute FINDINGS: RIGHT: The right common carotid artery demonstrates peak systolic velocities of 59 and 65 cm/sec in the proximal and distal segments respectively. The right internal carotid artery demonstrates the systolic velocities of 90, 79 and 75 cm/sec in the proximal, mid and distal segments respectively. The external carotid artery is patent. The vertebral artery demonstrates normal antegrade flow. There is irregular focal plaque at the carotid bulb and proximal internal carotid artery as well as the external carotid artery. ICA/CCA ratio of 1.4. LEFT: The left common carotid artery demonstrates peak systolic velocities of 61 and 61 cm/sec in the proximal and distal segments respectively. The left internal carotid artery demonstrates the systolic velocities of 63, 66 and 66 cm/sec in the proximal, mid and distal segments respectively. The external carotid artery is patent. The vertebral artery demonstrates normal antegrade flow. There is focal eccentric plaque at the carotid bulb and proximal external carotid artery. ICA/CCA ratio of 1.1. The right internal carotid artery demonstrates 0-50% stenosis. The left internal carotid artery demonstrates 0-50% stenosis. Bilateral vertebral arteries are patent with flow in the normal direction. Focal areas of atherosclerotic plaque as described. Mri Brain Without Contrast    Result Date: 8/19/2019  EXAMINATION: MRI OF THE BRAIN WITHOUT CONTRAST AND MRA HEAD WITHOUT CONTRAST 8/19/2019 11:34 am; 8/19/2019 11:33 am TECHNIQUE: Multiplanar multisequence MRI of the brain was performed without the administration of intravenous contrast. MRA of the head was performed utilizing time-of-flight imaging with MIP images. No intravenous contrast was administered. COMPARISON: None. HISTORY: ORDERING SYSTEM PROVIDED HISTORY: TIA/AMS workup, slurred speech; ORDERING SYSTEM PROVIDED HISTORY: TIA/AMS workup; slurred speech TECHNOLOGIST PROVIDED HISTORY: Reason for Exam: possible tia. Pt confused and moved during MRI exam   JG Acuity: Unknown Type of Exam: Unknown FINDINGS: MRI BRAIN: INTRACRANIAL STRUCTURES/VENTRICLES: The study is degraded by motion artifact. There is an acute lacunar infarct within the left lentiform nucleus. Volume loss is noted with mild chronic white matter microvascular ischemic disease characterized by periventricular white matter signal abnormality. No mass, shift, or bleed is visualized. There is a chronic lacunar infarct within the cerebellum. ORBITS: The visualized portion of the orbits demonstrate no acute abnormality. SINUSES: The visualized paranasal sinuses and mastoid air cells are well aerated. BONES/SOFT TISSUES: The bone marrow signal intensity appears normal. The soft tissues demonstrate no acute abnormality. MRA HEAD: ANTERIOR CIRCULATION: The internal carotid arteries are normal in course and caliber without focal stenosis. The anterior cerebral and middle cerebral arteries demonstrate no focal stenosis. Bilateral posterior communicating arteries are present.  POSTERIOR CIRCULATION: The distal vertebral arteries have largely been excluded from view.  The basilar artery is very small, probably a normal variation due the presence of prominent posterior communicating arteries. There may be a severe focal stenosis of the mid basilar artery on image number 38 of series 5. The posterior cerebral arteries are normal in course and caliber to the extent visualized. ANEURYSM: No intracranial aneurysm is seen. Acute lacunar infarct within the left lentiform nucleus. No large vessel occlusion detected. Questionable severe focal stenosis of the mid basilar artery versus artifact. The findings were sent to the Radiology Results [address] at 12:47 pm on 8/19/2019to be communicated to a licensed caregiver.        LAB RESULTS  --------------------    Recent Results (from the past 24 hour(s))   Basic metabolic panel    Collection Time: 08/21/19  2:07 AM   Result Value Ref Range    Sodium 132 (L) 135 - 145 MMOL/L    Potassium 3.4 (L) 3.5 - 5.1 MMOL/L    Chloride 98 (L) 99 - 110 mMol/L    CO2 21 21 - 32 MMOL/L    Anion Gap 13 4 - 16    BUN 25 (H) 6 - 23 MG/DL    CREATININE 1.3 (H) 0.6 - 1.1 MG/DL    Glucose 116 (H) 70 - 99 MG/DL    Calcium 9.0 8.3 - 10.6 MG/DL    GFR Non- 38 (L) >60 mL/min/1.73m2    GFR  46 (L) >60 mL/min/1.73m2         Medical problems    Patient Active Problem List:     Atrial fibrillation (HCC)     Hypokalemia     Pacemaker     Obstipation     Altered mental status, unspecified      ASSESSMENT:  ---------------------    Left  acute Lentiform nucleus infarct r/o carotid cardio embolic event     ? Basilar artery stenosis-pts daughter did not want CTA head done     demenita     HTN     HLD     PLAN:     CT brain neg     Mri brain as above     C doppler echo neg     B 12 folate TSH nl    Elevated homocysteine level-add foltx     continue asa     Continue  Plavix     Increase namenda    DC  exelon patch    Pt is doing some better this afternoon- was confused this am-.discussed with pts daughter. .        Electronically signed by Megan Monte MD on 8/21/2019 at 4:20 PM

## 2019-08-21 NOTE — PROGRESS NOTES
daughter    PAIN RATING (0-10 Scale): does not appear in any distress  Time in/Time out: SLP Individual Minutes  Time In: 1350  Time Out: Cherelle 141  Minutes: 25    Visit number: 2      Joshuajosé Juan C  8/21/2019  2:14 PM

## 2019-08-21 NOTE — PROGRESS NOTES
Occupational Therapy      Occupational Therapy Treatment Note  Name: Lakisha Spann MRN: [id number] :   1921   Date:  2019   Admission Date: 2019 Room:  [facility]43024-A   Restrictions/Precautions:  Restrictions/Precautions  Restrictions/Precautions: Fall Risk   Communication with other providers:  Co-tx w/ PT, handoff to nursing  Subjective:  Patient states:  \"I have to go to the bathroom\"  Pain:   Location, Type, Intensity (0/10 to 10/10): Denies  Objective:    Observation:  Pt received sitting upright in chair, daughter in room, agreeable to therapy  Treatment, including education:  Sit to stand from reclining chair up to RW modA, functional mobility to/from bathroom Neville w/ RW (See PT notes for gait details). Toilet transfer modA, LE dressing threading underwear maxA due to decreased dynamic standing balance, decreased cognition and decreased coordination. LE bathing maxA washing jamal care/buttocks in stand. Pt in stand at sink CGA for oral care brushing teeth Neville due to decreased sequencing and coordination. Returned to chair Neville for 3 minute rest break. 02 saturation ~93%. Pt functional mobility in room w/ RW Neville. Pt stand to sit Neville. Sit to supine Neville. Scooting to [address] [place] [address] x 2. Pt supine in bed, bed alarm on, call light at side, nursing notified, daughters in room.     Assessment / Impression:        Patient's tolerance of treatment:  Pt tolerated treatment well   Adverse Reaction: none  Significant change in status and impact:  none  Barriers to improvement:  Decreased cognition    Plan for Next Session:    Pt will perform LE ADL in stand to increase dynamic standing balance    Time in:  1410  Time out:  1448  Timed treatment minutes:  38 minutes  Total treatment time:  38 minutes    Electronically signed by:    Nancy TURPIN/L [id number]  3:02 PM,2019     Previously filed values:    Goals:  By d/c or goals met:  Pt will perform all bed mobility with Neville in prep for EOB/OOB

## 2019-08-22 ENCOUNTER — APPOINTMENT (OUTPATIENT)
Dept: CT IMAGING | Age: 84
DRG: 064 | End: 2019-08-22
Payer: MEDICARE

## 2019-08-22 ENCOUNTER — APPOINTMENT (OUTPATIENT)
Dept: GENERAL RADIOLOGY | Age: 84
DRG: 064 | End: 2019-08-22
Payer: MEDICARE

## 2019-08-22 LAB
ANION GAP SERPL CALCULATED.3IONS-SCNC: 10 MMOL/L (ref 4–16)
BACTERIA: NEGATIVE /HPF
BILIRUBIN URINE: NEGATIVE MG/DL
BLOOD, URINE: NEGATIVE
BUN BLDV-MCNC: 25 MG/DL (ref 6–23)
CALCIUM SERPL-MCNC: 8.7 MG/DL (ref 8.3–10.6)
CHLORIDE BLD-SCNC: 102 MMOL/L (ref 99–110)
CLARITY: CLEAR
CO2: 23 MMOL/L (ref 21–32)
COLOR: YELLOW
CREAT SERPL-MCNC: 1 MG/DL (ref 0.6–1.1)
GFR AFRICAN AMERICAN: >60 ML/MIN/1.73M2
GFR NON-AFRICAN AMERICAN: 51 ML/MIN/1.73M2
GLUCOSE BLD-MCNC: 104 MG/DL (ref 70–99)
GLUCOSE, URINE: NEGATIVE MG/DL
HEMOCCULT SP1 STL QL: NORMAL
KETONES, URINE: NEGATIVE MG/DL
LEUKOCYTE ESTERASE, URINE: NEGATIVE
MUCUS: ABNORMAL HPF
NITRITE URINE, QUANTITATIVE: NEGATIVE
PH, URINE: 6 (ref 5–8)
POTASSIUM SERPL-SCNC: 3.6 MMOL/L (ref 3.5–5.1)
PROTEIN UA: NEGATIVE MG/DL
RBC URINE: <1 /HPF (ref 0–6)
SODIUM BLD-SCNC: 135 MMOL/L (ref 135–145)
SPECIFIC GRAVITY UA: 1.01 (ref 1–1.03)
UROBILINOGEN, URINE: NORMAL MG/DL (ref 0.2–1)
WBC UA: 2 /HPF (ref 0–5)

## 2019-08-22 PROCEDURE — 36415 COLL VENOUS BLD VENIPUNCTURE: CPT

## 2019-08-22 PROCEDURE — 51798 US URINE CAPACITY MEASURE: CPT

## 2019-08-22 PROCEDURE — 1200000000 HC SEMI PRIVATE

## 2019-08-22 PROCEDURE — 2580000003 HC RX 258: Performed by: INTERNAL MEDICINE

## 2019-08-22 PROCEDURE — G0328 FECAL BLOOD SCRN IMMUNOASSAY: HCPCS

## 2019-08-22 PROCEDURE — 80048 BASIC METABOLIC PNL TOTAL CA: CPT

## 2019-08-22 PROCEDURE — 81001 URINALYSIS AUTO W/SCOPE: CPT

## 2019-08-22 PROCEDURE — 6370000000 HC RX 637 (ALT 250 FOR IP): Performed by: INTERNAL MEDICINE

## 2019-08-22 PROCEDURE — 6360000002 HC RX W HCPCS: Performed by: NURSE PRACTITIONER

## 2019-08-22 PROCEDURE — 6370000000 HC RX 637 (ALT 250 FOR IP): Performed by: FAMILY MEDICINE

## 2019-08-22 PROCEDURE — 94761 N-INVAS EAR/PLS OXIMETRY MLT: CPT

## 2019-08-22 PROCEDURE — 6370000000 HC RX 637 (ALT 250 FOR IP): Performed by: NURSE PRACTITIONER

## 2019-08-22 PROCEDURE — 6370000000 HC RX 637 (ALT 250 FOR IP): Performed by: PSYCHIATRY & NEUROLOGY

## 2019-08-22 PROCEDURE — 2580000003 HC RX 258: Performed by: HOSPITALIST

## 2019-08-22 PROCEDURE — 99211 OFF/OP EST MAY X REQ PHY/QHP: CPT

## 2019-08-22 PROCEDURE — 70450 CT HEAD/BRAIN W/O DYE: CPT

## 2019-08-22 PROCEDURE — 74018 RADEX ABDOMEN 1 VIEW: CPT

## 2019-08-22 PROCEDURE — 51702 INSERT TEMP BLADDER CATH: CPT

## 2019-08-22 PROCEDURE — 2580000003 HC RX 258: Performed by: NURSE PRACTITIONER

## 2019-08-22 PROCEDURE — 6360000002 HC RX W HCPCS: Performed by: INTERNAL MEDICINE

## 2019-08-22 RX ORDER — MEMANTINE HYDROCHLORIDE 5 MG/1
5 TABLET ORAL
Status: DISCONTINUED | OUTPATIENT
Start: 2019-08-23 | End: 2019-08-22

## 2019-08-22 RX ORDER — POLYETHYLENE GLYCOL 3350 17 G/17G
17 POWDER, FOR SOLUTION ORAL DAILY
Status: DISCONTINUED | OUTPATIENT
Start: 2019-08-23 | End: 2019-08-23 | Stop reason: HOSPADM

## 2019-08-22 RX ORDER — MEMANTINE HYDROCHLORIDE 5 MG/1
5 TABLET ORAL
Status: DISCONTINUED | OUTPATIENT
Start: 2019-08-22 | End: 2019-08-23 | Stop reason: HOSPADM

## 2019-08-22 RX ORDER — BISACODYL 10 MG
10 SUPPOSITORY, RECTAL RECTAL DAILY PRN
Status: DISCONTINUED | OUTPATIENT
Start: 2019-08-22 | End: 2019-08-23 | Stop reason: HOSPADM

## 2019-08-22 RX ADMIN — LABETALOL HYDROCHLORIDE 100 MG: 100 TABLET, FILM COATED ORAL at 20:34

## 2019-08-22 RX ADMIN — DOCUSATE SODIUM 100 MG: 100 CAPSULE, LIQUID FILLED ORAL at 20:34

## 2019-08-22 RX ADMIN — POLYETHYLENE GLYCOL 3350 17 G: 17 POWDER, FOR SOLUTION ORAL at 11:04

## 2019-08-22 RX ADMIN — DOCUSATE SODIUM 100 MG: 100 CAPSULE, LIQUID FILLED ORAL at 10:48

## 2019-08-22 RX ADMIN — ASPIRIN 81 MG: 81 TABLET, COATED ORAL at 10:47

## 2019-08-22 RX ADMIN — CLOPIDOGREL BISULFATE 75 MG: 75 TABLET ORAL at 10:48

## 2019-08-22 RX ADMIN — HYDRALAZINE HYDROCHLORIDE 10 MG: 20 INJECTION INTRAMUSCULAR; INTRAVENOUS at 07:15

## 2019-08-22 RX ADMIN — LISINOPRIL 20 MG: 20 TABLET ORAL at 10:48

## 2019-08-22 RX ADMIN — HYDROCHLOROTHIAZIDE 25 MG: 25 TABLET ORAL at 10:48

## 2019-08-22 RX ADMIN — CLONIDINE HYDROCHLORIDE 0.1 MG: 0.1 TABLET ORAL at 10:59

## 2019-08-22 RX ADMIN — CALCIUM POLYCARBOPHIL 625 MG TABLET 625 MG: at 10:48

## 2019-08-22 RX ADMIN — CEFTRIAXONE 1 G: 1 INJECTION, POWDER, FOR SOLUTION INTRAMUSCULAR; INTRAVENOUS at 06:24

## 2019-08-22 RX ADMIN — SODIUM CHLORIDE: 9 INJECTION, SOLUTION INTRAVENOUS at 01:31

## 2019-08-22 RX ADMIN — FAMOTIDINE 20 MG: 20 TABLET ORAL at 10:48

## 2019-08-22 RX ADMIN — SODIUM CHLORIDE, PRESERVATIVE FREE 10 ML: 5 INJECTION INTRAVENOUS at 20:42

## 2019-08-22 RX ADMIN — MEMANTINE HYDROCHLORIDE 5 MG: 5 TABLET ORAL at 18:47

## 2019-08-22 RX ADMIN — ENOXAPARIN SODIUM 30 MG: 30 INJECTION SUBCUTANEOUS at 10:49

## 2019-08-22 RX ADMIN — LABETALOL HYDROCHLORIDE 100 MG: 100 TABLET, FILM COATED ORAL at 10:47

## 2019-08-22 RX ADMIN — PRAVASTATIN SODIUM 10 MG: 10 TABLET ORAL at 20:34

## 2019-08-22 ASSESSMENT — PAIN SCALES - PAIN ASSESSMENT IN ADVANCED DEMENTIA (PAINAD)
CONSOLABILITY: 0
NEGVOCALIZATION: 0
TOTALSCORE: 1
BODYLANGUAGE: 0
FACIALEXPRESSION: 0
FACIALEXPRESSION: 0
BREATHING: 1
TOTALSCORE: 1
TOTALSCORE: 1
CONSOLABILITY: 0
BREATHING: 1
CONSOLABILITY: 0
BREATHING: 1
BODYLANGUAGE: 0
NEGVOCALIZATION: 0
FACIALEXPRESSION: 0
BODYLANGUAGE: 0
NEGVOCALIZATION: 0

## 2019-08-22 NOTE — PROGRESS NOTES
Mechanism of Injury: Unwitnessed fall Relevant Medical/Surgical History: none FINDINGS: BRAIN/VENTRICLES: There is no acute intracranial hemorrhage, mass effect or midline shift. No abnormal extra-axial fluid collection. No evidence of recent territorial infarct. Chronic right basal ganglia infarct. Evolving infarct in the left basal ganglia. No evidence of hemorrhagic transformation. There are nonspecific areas of hypoattenuation in the periventricular white matter and centrum semiovale that are likely related to chronic small vessel ischemic disease. The ventricles and cisternal spaces are prominent consistent with cerebral atrophy. There is no evidence of hydrocephalus. There is intracranial atherosclerosis. ORBITS: The visualized portion of the orbits demonstrate no acute abnormality. SINUSES: The visualized paranasal sinuses and mastoid air cells demonstrate no acute abnormality. SOFT TISSUES/SKULL:  No acute abnormality of the visualized skull or soft tissues. 1. No acute intracranial abnormality. 2. Evolving infarct in the left basal ganglia. No evidence of hemorrhagic transformation. Ct Head Wo Contrast    Result Date: 8/18/2019  EXAMINATION: CT OF THE HEAD WITHOUT CONTRAST  8/18/2019 10:18 pm TECHNIQUE: CT of the head was performed without the administration of intravenous contrast. Dose modulation, iterative reconstruction, and/or weight based adjustment of the mA/kV was utilized to reduce the radiation dose to as low as reasonably achievable. COMPARISON: 12 hours prior HISTORY: ORDERING SYSTEM PROVIDED HISTORY: FOCAL NEURO DEFICIT, NEW, FIXED OR WORSENING, 3-24 HOURS TECHNOLOGIST PROVIDED HISTORY: Has a \"code stroke\" or \"stroke alert\" been called? ->No Reason for Exam: ams FINDINGS: BRAIN/VENTRICLES: Ventricles and sulci are stable. Multiple images are limited due to artifact. A small amount of subcortical white matter low density at the vertex is again noted.   Minimal hazy low-density workup, slurred speech; ORDERING SYSTEM PROVIDED HISTORY: TIA/AMS workup; slurred speech TECHNOLOGIST PROVIDED HISTORY: Reason for Exam: possible tia. Pt confused and moved during MRI exam   JG Acuity: Unknown Type of Exam: Unknown FINDINGS: MRI BRAIN: INTRACRANIAL STRUCTURES/VENTRICLES: The study is degraded by motion artifact. There is an acute lacunar infarct within the left lentiform nucleus. Volume loss is noted with mild chronic white matter microvascular ischemic disease characterized by periventricular white matter signal abnormality. No mass, shift, or bleed is visualized. There is a chronic lacunar infarct within the cerebellum. ORBITS: The visualized portion of the orbits demonstrate no acute abnormality. SINUSES: The visualized paranasal sinuses and mastoid air cells are well aerated. BONES/SOFT TISSUES: The bone marrow signal intensity appears normal. The soft tissues demonstrate no acute abnormality. MRA HEAD: ANTERIOR CIRCULATION: The internal carotid arteries are normal in course and caliber without focal stenosis. The anterior cerebral and middle cerebral arteries demonstrate no focal stenosis. Bilateral posterior communicating arteries are present. POSTERIOR CIRCULATION: The distal vertebral arteries have largely been excluded from view. The basilar artery is very small, probably a normal variation due the presence of prominent posterior communicating arteries. There may be a severe focal stenosis of the mid basilar artery on image number 38 of series 5. The posterior cerebral arteries are normal in course and caliber to the extent visualized. ANEURYSM: No intracranial aneurysm is seen. Acute lacunar infarct within the left lentiform nucleus. No large vessel occlusion detected. Questionable severe focal stenosis of the mid basilar artery versus artifact.  The findings were sent to the Radiology Results Communication Center at 12:47 pm on 8/19/2019to be communicated to a licensed normal in course and caliber without focal stenosis. The anterior cerebral and middle cerebral arteries demonstrate no focal stenosis. Bilateral posterior communicating arteries are present. POSTERIOR CIRCULATION: The distal vertebral arteries have largely been excluded from view. The basilar artery is very small, probably a normal variation due the presence of prominent posterior communicating arteries. There may be a severe focal stenosis of the mid basilar artery on image number 38 of series 5. The posterior cerebral arteries are normal in course and caliber to the extent visualized. ANEURYSM: No intracranial aneurysm is seen. Acute lacunar infarct within the left lentiform nucleus. No large vessel occlusion detected. Questionable severe focal stenosis of the mid basilar artery versus artifact. The findings were sent to the Radiology Results [address] at 12:47 pm on 8/19/2019to be communicated to a licensed caregiver.        LAB RESULTS  --------------------    Recent Results (from the past 24 hour(s))   Basic metabolic panel    Collection Time: 08/22/19  2:50 AM   Result Value Ref Range    Sodium 135 135 - 145 MMOL/L    Potassium 3.6 3.5 - 5.1 MMOL/L    Chloride 102 99 - 110 mMol/L    CO2 23 21 - 32 MMOL/L    Anion Gap 10 4 - 16    BUN 25 (H) 6 - 23 MG/DL    CREATININE 1.0 0.6 - 1.1 MG/DL    Glucose 104 (H) 70 - 99 MG/DL    Calcium 8.7 8.3 - 10.6 MG/DL    GFR Non- 51 (L) >60 mL/min/1.73m2    GFR African American >60 >60 mL/min/1.73m2   Urinalysis Reflex to Culture    Collection Time: 08/22/19 10:00 AM   Result Value Ref Range    Color, UA YELLOW YELLOW    Clarity, UA CLEAR CLEAR    Glucose, Urine NEGATIVE NEGATIVE MG/DL    Bilirubin Urine NEGATIVE NEGATIVE MG/DL    Ketones, Urine NEGATIVE NEGATIVE MG/DL    Specific Gravity, UA 1.010 1.001 - 1.035    Blood, Urine NEGATIVE NEGATIVE    pH, Urine 6.0 5.0 - 8.0    Protein, UA NEGATIVE NEGATIVE MG/DL    Urobilinogen, Urine NORMAL 0.2 -

## 2019-08-22 NOTE — CONSULTS
Kristina Pena 761, 6/26/1921, [id number], 8/20/2019    History  [place]:  The primary encounter diagnosis was Altered mental status, unspecified altered mental status type. Diagnoses of Injury of head, initial encounter, Pain of upper abdomen, and Elevated lipase were also pertinent to this visit. Patient  has a past medical history of Arthritis, Hyperlipidemia, Hypertension, Pacemaker, and SSS (sick sinus syndrome) ([facility] [address] Patient  has a past surgical history that includes Appendectomy; knee surgery; hip surgery; back surgery; and Pacemaker insertion (6/9/16). Subjective:  Patient states:  Difficult to understand d/t aphasia/dysarthria, \"I want to wash up\". Pain:  Denies. Communication with other providers:  Handoff to RN  Restrictions: Fall risk    Home Setup/Prior level of function  Social/Functional History  Lives With: Daughter(dtr lives with pt in pt's house)  Type of Home: House  Home Layout: One level  Home Access: Stairs to enter with rails  Entrance Stairs - Number of Steps: 1 step c grab bar  Bathroom Shower/Tub: Walk-in shower, Shower chair without back(4 inch ledge to step over to access walk-in)  Home Equipment: [employer]  ADL Assistance: Needs assistance(her dtr assists with bathing, otherwise pt manages ADL)  Homemaking Responsibilities: No  Ambulation Assistance: Independent(CATALINO c cane within house)  Transfer Assistance: Independent  Active : No  Additional Comments: Pt's dtr, Je Band, manages transportation , cooking, and general supervision of pt. Pt had one fall prior to admission. Musculoskeletal  · ROM R/L:  WFL. · Strength R/L:  4-/5, weakness in function and endurance. · Neuro:  Impaired cognition, communication, coordination, balance, and strength      Mobility:  · Transfers: Mod-max A with RW  · Sitting balance:  SBA. · Standing balance:   Mod-min A with RW.    · Gait: Mod A    Treatment:  Patient performed STS
Kristina Villarreal Pena 761, 6/26/1921, 3006/3006-A, 8/19/2019    History  [place]:  The primary encounter diagnosis was Altered mental status, unspecified altered mental status type. Diagnoses of Injury of head, initial encounter, Pain of upper abdomen, and Elevated lipase were also pertinent to this visit. Patient  has a past medical history of Arthritis, Hyperlipidemia, Hypertension, Pacemaker, and SSS (sick sinus syndrome) ([facility] [address] Patient  has a past surgical history that includes Appendectomy; knee surgery; hip surgery; back surgery; and Pacemaker insertion (6/9/16). Subjective:  Patient states:  Amenable to therapy. Pain:  6/10 in R wrist (IV and recent fall at home). Communication with other providers:  Handoff to RN, co-eval with OT. Restrictions: Fall risk    Home Setup/Prior level of function  Social/Functional History  Lives With: Daughter(dtr lives with pt in pt's house)  Type of Home: House  Home Layout: One level  Home Access: Stairs to enter with rails  Entrance Stairs - Number of Steps: 1 step c grab bar  Bathroom Shower/Tub: Walk-in shower, Shower chair without back(4 inch ledge to step over to access walk-in)  Home Equipment: [employer]  ADL Assistance: Needs assistance(her dtr assists with bathing, otherwise pt manages ADL)  Homemaking Responsibilities: No  Ambulation Assistance: Independent(CATALINO c cane within house)  Transfer Assistance: Independent  Active : No  Additional Comments: Pt's dtr, [employer], manages transportation , cooking, and general supervision of pt. Pt had one fall prior to admission.      Examination of body systems (includes body structures/functions, activity/participation limitations):  · Observation:  Sitting up in chair upon arrival  · Vision:  WFL, glasses  · Hearing:  [place], hearing aides  · Cardiopulmonary:  No O2 needs  · Cognition: impaired, increased processing time, see OT/SLP note for further
daily. 30 capsule 0    MULTIPLE VITAMIN PO Take 1 tablet by mouth daily       polyethylene glycol (MIRALAX) powder Take 17 g by mouth daily as needed            Objective:      BP (!) 185/63   Pulse 84   Temp 98 °F (36.7 °C) (Oral)   Resp 21   Ht 5' 7\" (1.702 m)   Wt 139 lb 9.6 oz (63.3 kg)   SpO2 96%   BMI 21.86 kg/m²   Govind Risk Score: Govind Scale Score: 16    LABS    CBC:   Lab Results   Component Value Date    WBC 10.7 08/19/2019    RBC 4.90 08/19/2019    HGB 14.7 08/19/2019    HCT 44.6 08/19/2019    MCV 91.0 08/19/2019    MCH 30.0 08/19/2019    MCHC 33.0 08/19/2019    RDW 13.2 08/19/2019     08/19/2019    MPV 10.5 08/19/2019     CMP:    Lab Results   Component Value Date     08/22/2019    K 3.6 08/22/2019     08/22/2019    CO2 23 08/22/2019    BUN 25 08/22/2019    CREATININE 1.0 08/22/2019    GFRAA >60 08/22/2019    LABGLOM 51 08/22/2019    GLUCOSE 104 08/22/2019    PROT 6.2 08/18/2019    LABALBU 3.7 08/18/2019    CALCIUM 8.7 08/22/2019    BILITOT 0.5 08/18/2019    ALKPHOS 59 08/18/2019    AST 23 08/18/2019    ALT 16 08/18/2019     Albumin:    Lab Results   Component Value Date    LABALBU 3.7 08/18/2019     PT/INR:    Lab Results   Component Value Date    PROTIME 11.1 02/10/2015    INR 0.97 02/10/2015     HgBA1c:    Lab Results   Component Value Date    LABA1C 5.5 08/19/2019         Assessment:     Patient Active Problem List   Diagnosis    Atrial fibrillation ([address]    Hypokalemia    Pacemaker    Obstipation    Altered mental status, unspecified       Measurements:  Wound 08/22/19 Arm Lower;Right (Active)   Wound Skin Tear 8/22/2019  9:00 AM   Dressing Status Clean;Dry; Intact 8/22/2019  9:00 AM   Dressing Changed Changed/New 8/22/2019  9:00 AM   Wound Cleansed Rinsed/Irrigated with saline 8/22/2019  9:00 AM   Wound Length (cm) 0.9 cm 8/22/2019  9:00 AM   Wound Width (cm) 1.7 cm 8/22/2019  9:00 AM   Wound Depth (cm) 0.1 cm 8/22/2019  9:00 AM   Wound Surface Area (cm^2) 1.53

## 2019-08-22 NOTE — PROGRESS NOTES
Pt son was concerned about her this morning states she wouldn't wake up to eat or talk to him like she normally has been doing. Pt complaining of some abdominal discomfort. Dr. Maci Gupta came to assess patient and ordered a bladder scan which showed 455 cc's. Order to place guzman. Abd xray ordered. Pt was very weak when attempted to get up for the bathroom and was leaning forward and to the side while sitting on edge of bed. Pt now more awake and alert. She was able to take her pills without problem. Pt son is refusing me to give her namenda until he talks to Dr. Epifanio Lozano.

## 2019-08-23 ENCOUNTER — HOSPITAL ENCOUNTER (INPATIENT)
Age: 84
LOS: 18 days | Discharge: HOME | DRG: 057 | End: 2019-09-10
Attending: PHYSICAL MEDICINE & REHABILITATION | Admitting: PHYSICAL MEDICINE & REHABILITATION
Payer: MEDICARE

## 2019-08-23 VITALS
DIASTOLIC BLOOD PRESSURE: 84 MMHG | TEMPERATURE: 97.4 F | HEIGHT: 67 IN | OXYGEN SATURATION: 96 % | WEIGHT: 145.4 LBS | BODY MASS INDEX: 22.82 KG/M2 | HEART RATE: 60 BPM | RESPIRATION RATE: 26 BRPM | SYSTOLIC BLOOD PRESSURE: 126 MMHG

## 2019-08-23 PROBLEM — R41.82 ALTERED MENTAL STATUS, UNSPECIFIED: Status: RESOLVED | Noted: 2019-08-18 | Resolved: 2019-08-23

## 2019-08-23 LAB
ANION GAP SERPL CALCULATED.3IONS-SCNC: 12 MMOL/L (ref 4–16)
BUN BLDV-MCNC: 18 MG/DL (ref 6–23)
CALCIUM SERPL-MCNC: 8.9 MG/DL (ref 8.3–10.6)
CHLORIDE BLD-SCNC: 99 MMOL/L (ref 99–110)
CO2: 23 MMOL/L (ref 21–32)
CREAT SERPL-MCNC: 0.8 MG/DL (ref 0.6–1.1)
GFR AFRICAN AMERICAN: >60 ML/MIN/1.73M2
GFR NON-AFRICAN AMERICAN: >60 ML/MIN/1.73M2
GLUCOSE BLD-MCNC: 105 MG/DL (ref 70–99)
POTASSIUM SERPL-SCNC: 3.2 MMOL/L (ref 3.5–5.1)
SODIUM BLD-SCNC: 134 MMOL/L (ref 135–145)

## 2019-08-23 PROCEDURE — 99223 1ST HOSP IP/OBS HIGH 75: CPT | Performed by: PHYSICAL MEDICINE & REHABILITATION

## 2019-08-23 PROCEDURE — 6370000000 HC RX 637 (ALT 250 FOR IP): Performed by: FAMILY MEDICINE

## 2019-08-23 PROCEDURE — 2580000003 HC RX 258: Performed by: NURSE PRACTITIONER

## 2019-08-23 PROCEDURE — 6370000000 HC RX 637 (ALT 250 FOR IP): Performed by: NURSE PRACTITIONER

## 2019-08-23 PROCEDURE — 1280000000 HC REHAB R&B

## 2019-08-23 PROCEDURE — 6360000002 HC RX W HCPCS: Performed by: NURSE PRACTITIONER

## 2019-08-23 PROCEDURE — 6370000000 HC RX 637 (ALT 250 FOR IP): Performed by: HOSPITALIST

## 2019-08-23 PROCEDURE — 36415 COLL VENOUS BLD VENIPUNCTURE: CPT

## 2019-08-23 PROCEDURE — 6370000000 HC RX 637 (ALT 250 FOR IP): Performed by: PSYCHIATRY & NEUROLOGY

## 2019-08-23 PROCEDURE — 6370000000 HC RX 637 (ALT 250 FOR IP): Performed by: PHYSICAL MEDICINE & REHABILITATION

## 2019-08-23 PROCEDURE — 80048 BASIC METABOLIC PNL TOTAL CA: CPT

## 2019-08-23 RX ORDER — PRAVASTATIN SODIUM 10 MG
10 TABLET ORAL NIGHTLY
Status: DISCONTINUED | OUTPATIENT
Start: 2019-08-23 | End: 2019-09-10 | Stop reason: HOSPADM

## 2019-08-23 RX ORDER — BISACODYL 10 MG
10 SUPPOSITORY, RECTAL RECTAL DAILY PRN
Status: DISCONTINUED | OUTPATIENT
Start: 2019-08-23 | End: 2019-09-10 | Stop reason: HOSPADM

## 2019-08-23 RX ORDER — LABETALOL 100 MG/1
100 TABLET, FILM COATED ORAL 2 TIMES DAILY
Status: DISCONTINUED | OUTPATIENT
Start: 2019-08-23 | End: 2019-09-10 | Stop reason: HOSPADM

## 2019-08-23 RX ORDER — CLOPIDOGREL BISULFATE 75 MG/1
75 TABLET ORAL DAILY
Status: CANCELLED | OUTPATIENT
Start: 2019-08-24

## 2019-08-23 RX ORDER — SIMVASTATIN 10 MG
10 TABLET ORAL NIGHTLY
Status: ON HOLD | COMMUNITY
End: 2019-09-10 | Stop reason: HOSPADM

## 2019-08-23 RX ORDER — MEMANTINE HYDROCHLORIDE 5 MG/1
5 TABLET ORAL
Status: DISCONTINUED | OUTPATIENT
Start: 2019-08-23 | End: 2019-09-10 | Stop reason: HOSPADM

## 2019-08-23 RX ORDER — CALCIUM POLYCARBOPHIL 625 MG 625 MG/1
625 TABLET ORAL DAILY
Status: CANCELLED | OUTPATIENT
Start: 2019-08-24

## 2019-08-23 RX ORDER — MEMANTINE HYDROCHLORIDE 5 MG/1
5 TABLET ORAL
Status: CANCELLED | OUTPATIENT
Start: 2019-08-23

## 2019-08-23 RX ORDER — DOCUSATE SODIUM 100 MG/1
100 CAPSULE, LIQUID FILLED ORAL 2 TIMES DAILY
Status: CANCELLED | OUTPATIENT
Start: 2019-08-23

## 2019-08-23 RX ORDER — POTASSIUM CHLORIDE 20 MEQ/1
20 TABLET, EXTENDED RELEASE ORAL
Status: DISCONTINUED | OUTPATIENT
Start: 2019-08-24 | End: 2019-09-10 | Stop reason: HOSPADM

## 2019-08-23 RX ORDER — POTASSIUM CHLORIDE 20 MEQ/1
20 TABLET, EXTENDED RELEASE ORAL
Status: CANCELLED | OUTPATIENT
Start: 2019-08-24

## 2019-08-23 RX ORDER — ACETAMINOPHEN 325 MG/1
650 TABLET ORAL EVERY 4 HOURS PRN
Status: DISCONTINUED | OUTPATIENT
Start: 2019-08-23 | End: 2019-08-23 | Stop reason: SDUPTHER

## 2019-08-23 RX ORDER — BISACODYL 10 MG
10 SUPPOSITORY, RECTAL RECTAL DAILY PRN
Status: CANCELLED | OUTPATIENT
Start: 2019-08-23

## 2019-08-23 RX ORDER — LISINOPRIL 20 MG/1
20 TABLET ORAL DAILY
Status: CANCELLED | OUTPATIENT
Start: 2019-08-24

## 2019-08-23 RX ORDER — HYDROCHLOROTHIAZIDE 25 MG/1
25 TABLET ORAL DAILY
Status: DISCONTINUED | OUTPATIENT
Start: 2019-08-24 | End: 2019-09-10 | Stop reason: HOSPADM

## 2019-08-23 RX ORDER — PRAVASTATIN SODIUM 10 MG
10 TABLET ORAL NIGHTLY
Status: CANCELLED | OUTPATIENT
Start: 2019-08-23

## 2019-08-23 RX ORDER — DOCUSATE SODIUM 100 MG/1
100 CAPSULE, LIQUID FILLED ORAL 2 TIMES DAILY
Status: DISCONTINUED | OUTPATIENT
Start: 2019-08-23 | End: 2019-09-10 | Stop reason: HOSPADM

## 2019-08-23 RX ORDER — ACETAMINOPHEN 325 MG/1
650 TABLET ORAL EVERY 4 HOURS PRN
Status: CANCELLED | OUTPATIENT
Start: 2019-08-23

## 2019-08-23 RX ORDER — LABETALOL 100 MG/1
100 TABLET, FILM COATED ORAL 2 TIMES DAILY
Status: CANCELLED | OUTPATIENT
Start: 2019-08-23

## 2019-08-23 RX ORDER — ASPIRIN 81 MG/1
81 TABLET, CHEWABLE ORAL DAILY
Status: CANCELLED | OUTPATIENT
Start: 2019-08-23

## 2019-08-23 RX ORDER — POLYETHYLENE GLYCOL 3350 17 G/17G
17 POWDER, FOR SOLUTION ORAL DAILY
Status: DISCONTINUED | OUTPATIENT
Start: 2019-08-24 | End: 2019-09-10 | Stop reason: HOSPADM

## 2019-08-23 RX ORDER — LISINOPRIL 20 MG/1
20 TABLET ORAL DAILY
Status: DISCONTINUED | OUTPATIENT
Start: 2019-08-24 | End: 2019-09-10 | Stop reason: HOSPADM

## 2019-08-23 RX ORDER — FAMOTIDINE 20 MG/1
20 TABLET, FILM COATED ORAL DAILY
Status: DISCONTINUED | OUTPATIENT
Start: 2019-08-24 | End: 2019-09-06

## 2019-08-23 RX ORDER — ACETAMINOPHEN 325 MG/1
650 TABLET ORAL EVERY 4 HOURS PRN
Status: DISCONTINUED | OUTPATIENT
Start: 2019-08-23 | End: 2019-09-10 | Stop reason: HOSPADM

## 2019-08-23 RX ORDER — HYDROCHLOROTHIAZIDE 25 MG/1
25 TABLET ORAL DAILY
Status: CANCELLED | OUTPATIENT
Start: 2019-08-24

## 2019-08-23 RX ORDER — ACETAMINOPHEN 325 MG/1
650 TABLET ORAL EVERY 4 HOURS PRN
Status: DISCONTINUED | OUTPATIENT
Start: 2019-08-23 | End: 2019-08-23 | Stop reason: HOSPADM

## 2019-08-23 RX ORDER — POLYETHYLENE GLYCOL 3350 17 G/17G
17 POWDER, FOR SOLUTION ORAL DAILY
Status: CANCELLED | OUTPATIENT
Start: 2019-08-24

## 2019-08-23 RX ORDER — CALCIUM POLYCARBOPHIL 625 MG 625 MG/1
625 TABLET ORAL DAILY
Status: DISCONTINUED | OUTPATIENT
Start: 2019-08-24 | End: 2019-09-10 | Stop reason: HOSPADM

## 2019-08-23 RX ORDER — FAMOTIDINE 20 MG/1
20 TABLET, FILM COATED ORAL DAILY
Status: CANCELLED | OUTPATIENT
Start: 2019-08-24

## 2019-08-23 RX ADMIN — LISINOPRIL 20 MG: 20 TABLET ORAL at 09:25

## 2019-08-23 RX ADMIN — DOCUSATE SODIUM 100 MG: 100 CAPSULE, LIQUID FILLED ORAL at 21:37

## 2019-08-23 RX ADMIN — DOCUSATE SODIUM 100 MG: 100 CAPSULE, LIQUID FILLED ORAL at 09:25

## 2019-08-23 RX ADMIN — ASPIRIN 81 MG: 81 TABLET, COATED ORAL at 09:25

## 2019-08-23 RX ADMIN — CEFTRIAXONE 1 G: 1 INJECTION, POWDER, FOR SOLUTION INTRAMUSCULAR; INTRAVENOUS at 05:08

## 2019-08-23 RX ADMIN — LABETALOL HYDROCHLORIDE 100 MG: 100 TABLET, FILM COATED ORAL at 09:25

## 2019-08-23 RX ADMIN — POLYETHYLENE GLYCOL 3350 17 G: 17 POWDER, FOR SOLUTION ORAL at 09:26

## 2019-08-23 RX ADMIN — FAMOTIDINE 20 MG: 20 TABLET ORAL at 09:25

## 2019-08-23 RX ADMIN — LABETALOL HYDROCHLORIDE 100 MG: 100 TABLET, FILM COATED ORAL at 21:35

## 2019-08-23 RX ADMIN — CALCIUM POLYCARBOPHIL 625 MG TABLET 625 MG: at 09:25

## 2019-08-23 RX ADMIN — CLOPIDOGREL BISULFATE 75 MG: 75 TABLET ORAL at 09:25

## 2019-08-23 RX ADMIN — PRAVASTATIN SODIUM 10 MG: 10 TABLET ORAL at 21:37

## 2019-08-23 RX ADMIN — ACETAMINOPHEN 650 MG: 325 TABLET ORAL at 22:32

## 2019-08-23 RX ADMIN — MEMANTINE HYDROCHLORIDE 5 MG: 5 TABLET ORAL at 17:56

## 2019-08-23 RX ADMIN — SODIUM CHLORIDE, PRESERVATIVE FREE 10 ML: 5 INJECTION INTRAVENOUS at 09:31

## 2019-08-23 RX ADMIN — ENOXAPARIN SODIUM 30 MG: 30 INJECTION SUBCUTANEOUS at 09:25

## 2019-08-23 RX ADMIN — POTASSIUM CHLORIDE 40 MEQ: 20 TABLET, EXTENDED RELEASE ORAL at 09:25

## 2019-08-23 RX ADMIN — HYDROCHLOROTHIAZIDE 25 MG: 25 TABLET ORAL at 09:25

## 2019-08-23 ASSESSMENT — PAIN SCALES - GENERAL
PAINLEVEL_OUTOF10: 3
PAINLEVEL_OUTOF10: 0
PAINLEVEL_OUTOF10: 0

## 2019-08-23 NOTE — DISCHARGE SUMMARY
Discharge Summary    Name:  Favian Spencer /Age/Sex: 1921  (80 y.o. female)   MRN & CSN:  [id number] & [id number] Admission Date/Time: 2019  8:56 AM   Attending:  Maki Saunders MD Discharging Physician: Maki Saunders MD     HPI:     As per admission H&P    Delmi Sue is a 80 y.o.  female  who presents from home with altered mental status, onset Thursday morning. Context is, patient was living independently until 1 year ago when her daughter moved in with her to provide more assistance. Daughter states the patient was still performing ADLs without assistance and is AAOx3 at baseline with no problems with cognition, memory. She ambulates at baseline with a cane. Thursday morning, upon awakening, the patient was noted to be confused, had a slight facial droop and slurring of speech. EMS was not called; daughter states she took the patient to her PCP  and was told it was likely a TIA. She reports patient still had sx at that time. They went home and symptoms persisted but did not worsen, according to the daughter, until today, when the patient fell while in the bathroom. This was a witnessed fall without LOC. On exam, patient is mildly somnolent, opens eyes to verbal stim, follows commands, demonstrates no focal weakness to extremities. She is not oriented to year, has slight slurring of speech, and mild R facial droop. Daughter advises the patient has a living will at home but she doesn't know what it states. She wants to discuss code status with her brother and agreed to leave as full code now until they talk. Patient denies chest pain/pressure, palpitations, sob, abdominal/back/flank pain, n/v, recent diarrhea. Reports pain to R wrist\".        Hospital Course:   Favian Spencer is a 80 y.o.  female  who presents with altered mental status    Urine retention  Required guzman cath briefly  Now able to void on her own     Altered mental status, resolved  Metabolic encephalopathy Exam: Initial  Mechanism of Injury: Unwitnessed fall  Relevant Medical/Surgical History: none    FINDINGS:  BRAIN/VENTRICLES: There is no acute intracranial hemorrhage, mass effect or  midline shift. No abnormal extra-axial fluid collection. No evidence of  recent territorial infarct. Chronic right basal ganglia infarct. Evolving  infarct in the left basal ganglia. No evidence of hemorrhagic  transformation. There are nonspecific areas of hypoattenuation in the  periventricular white matter and centrum semiovale that are likely related to  chronic small vessel ischemic disease. The ventricles and cisternal spaces  are prominent consistent with cerebral atrophy. There is no evidence of  hydrocephalus. There is intracranial atherosclerosis. ORBITS: The visualized portion of the orbits demonstrate no acute abnormality. SINUSES: The visualized paranasal sinuses and mastoid air cells demonstrate  no acute abnormality. SOFT TISSUES/SKULL:  No acute abnormality of the visualized skull or soft  tissues. Impression:       1. No acute intracranial abnormality. 2. Evolving infarct in the left basal ganglia. No evidence of hemorrhagic  transformation. CT HEAD W WO CONTRAST [[id number]]      Order Status: Canceled      Vascular carotid duplex bilateral [[id number]] Collected: 08/19/19 0004     Order Status: Completed Updated: 08/19/19 2156     Narrative:       EXAMINATION:  ULTRASOUND EVALUATION OF THE CAROTID ARTERIES    8/18/2019    COMPARISON:  None. HISTORY:  ORDERING SYSTEM PROVIDED HISTORY: TIA/ams workup; slurred speech, confusion  TECHNOLOGIST PROVIDED HISTORY:  Reason for exam:->TIA/ams workup; slurred speech, confusion  Reason for Exam: AMS. Confusion. TIA  Acuity: Acute    FINDINGS:    RIGHT:    The right common carotid artery demonstrates peak systolic velocities of 59  and 65 cm/sec in the proximal and distal segments respectively.     The right internal carotid artery demonstrates the 1250     Narrative:       EXAMINATION:  MRI OF THE BRAIN WITHOUT CONTRAST AND MRA HEAD WITHOUT CONTRAST    8/19/2019 11:34 am; 8/19/2019 11:33 am    TECHNIQUE:  Multiplanar multisequence MRI of the brain was performed without the  administration of intravenous contrast.    MRA of the head was performed utilizing time-of-flight imaging with MIP  images. No intravenous contrast was administered. COMPARISON:  None. HISTORY:  ORDERING SYSTEM PROVIDED HISTORY: TIA/AMS workup, slurred speech; ORDERING  SYSTEM PROVIDED HISTORY: TIA/AMS workup; slurred speech  TECHNOLOGIST PROVIDED HISTORY:  Reason for Exam: possible tia. Pt confused and moved during MRI exam   JG  Acuity: Unknown  Type of Exam: Unknown    FINDINGS:  MRI BRAIN:    INTRACRANIAL STRUCTURES/VENTRICLES: The study is degraded by motion artifact. There is an acute lacunar infarct within the left lentiform nucleus. Volume  loss is noted with mild chronic white matter microvascular ischemic disease  characterized by periventricular white matter signal abnormality. No mass,  shift, or bleed is visualized. There is a chronic lacunar infarct within the  cerebellum. ORBITS: The visualized portion of the orbits demonstrate no acute abnormality. SINUSES: The visualized paranasal sinuses and mastoid air cells are well  aerated. BONES/SOFT TISSUES: The bone marrow signal intensity appears normal. The soft  tissues demonstrate no acute abnormality. MRA HEAD:    ANTERIOR CIRCULATION: The internal carotid arteries are normal in course and  caliber without focal stenosis. The anterior cerebral and middle cerebral  arteries demonstrate no focal stenosis. Bilateral posterior communicating  arteries are present. POSTERIOR CIRCULATION: The distal vertebral arteries have largely been  excluded from view. The basilar artery is very small, probably a normal  variation due the presence of prominent posterior communicating arteries.   There may be a severe focal stenosis of the mid basilar artery on image  number 38 of series 5. The posterior cerebral arteries are normal in course  and caliber to the extent visualized. ANEURYSM: No intracranial aneurysm is seen. Impression:       Acute lacunar infarct within the left lentiform nucleus. No large vessel occlusion detected. Questionable severe focal stenosis of the mid basilar artery versus artifact. The findings were sent to the Radiology Results [address] at 12:47  pm on 8/19/2019to be communicated to a licensed caregiver. CT HEAD WO CONTRAST [[id number]] Collected: 08/18/19 2225     Order Status: Completed Updated: 08/18/19 2236     Narrative:       EXAMINATION:  CT OF THE HEAD WITHOUT CONTRAST  8/18/2019 10:18 pm    TECHNIQUE:  CT of the head was performed without the administration of intravenous  contrast. Dose modulation, iterative reconstruction, and/or weight based  adjustment of the mA/kV was utilized to reduce the radiation dose to as low  as reasonably achievable. COMPARISON:  12 hours prior    HISTORY:  ORDERING SYSTEM PROVIDED HISTORY: FOCAL NEURO DEFICIT, NEW, FIXED OR  WORSENING, 3-24 HOURS  TECHNOLOGIST PROVIDED HISTORY:  Has a \"code stroke\" or \"stroke alert\" been called? ->No  Reason for Exam: ams    FINDINGS:  BRAIN/VENTRICLES: Ventricles and sulci are stable. Multiple images are  limited due to artifact. A small amount of subcortical white matter low  density at the vertex is again noted. Minimal hazy low-density in the  periventricular white matter is again noted. Internal capsule low densities  are not as well seen. There is no definite new area of parenchymal abnormal  density. Vascular calcifications are noted. There is questionable  asymmetric high density in the left middle cerebral artery within the  anterior sylvian fissure region. ORBITS: No acute orbital abnormality is noted.   No deviation of the pupils is  noted    SINUSES: The visualized paranasal S-shaped curvature of the  thoracolumbar spine with associated endplate degenerative changes. No acute  fracture is identified. Impression:       No acute abnormality in the abdomen or pelvis. CT Cervical Spine WO Contrast [[id number]] Collected: 08/18/19 1047     Order Status: Completed Updated: 08/18/19 1050     Narrative:       EXAMINATION:  CT OF THE CERVICAL SPINE WITHOUT CONTRAST 8/18/2019 10:15 am    TECHNIQUE:  CT of the cervical spine was performed without the administration of  intravenous contrast. Multiplanar reformatted images are provided for review. Dose modulation, iterative reconstruction, and/or weight based adjustment of  the mA/kV was utilized to reduce the radiation dose to as low as reasonably  achievable. COMPARISON:  None. HISTORY:  ORDERING SYSTEM PROVIDED HISTORY: head injury  TECHNOLOGIST PROVIDED HISTORY:  Reason for Exam: fall, patient in c-collar  Acuity: Acute  Type of Exam: Initial  Mechanism of Injury: fall  Relevant Medical/Surgical History: none    FINDINGS:  BONES/ALIGNMENT: There is no evidence of an acute cervical spine fracture. There is normal alignment of the cervical spine. DEGENERATIVE CHANGES: Multilevel degenerative changes. SOFT TISSUES: There is no prevertebral soft tissue swelling. Impression:       No acute abnormality of the cervical spine. CT Head WO Contrast [[id number]] Collected: 08/18/19 1043     Order Status: Completed Updated: 08/18/19 1047     Narrative:       EXAMINATION:  CT OF THE HEAD WITHOUT CONTRAST  8/18/2019 10:14 am    TECHNIQUE:  CT of the head was performed without the administration of intravenous  contrast. Dose modulation, iterative reconstruction, and/or weight based  adjustment of the mA/kV was utilized to reduce the radiation dose to as low  as reasonably achievable. COMPARISON:  None.     HISTORY:  ORDERING SYSTEM PROVIDED HISTORY: head injury (slurred speach for 2 days)  TECHNOLOGIST PROVIDED HISTORY:  Has a

## 2019-08-23 NOTE — PROGRESS NOTES
NEUROLOGY NOTE  DR. Izaiah Mazariegos MD.  -------------------------------------------------  Subjective:    Pt is not drowsy today and doing better    pts daughter states she has snoring at night and has shallow breathing at night in the last few months    Objective:    /84   Pulse 60   Temp 97.4 °F (36.3 °C) (Oral)   Resp 26   Ht 5' 7\" (1.702 m)   Wt 145 lb 6.4 oz (66 kg)   SpO2 96%   BMI 22.77 kg/m²   HEENT nl      Neuro exam    alert Oriented  X 2  Follow simple commands  Moderate dementia  EOMI Pupils 3 mm sally  5/5 all 4 extremities      RADIOLOGY  -----------------    Xr Wrist Right (min 3 Views)    Result Date: 8/18/2019  EXAMINATION: 3 XRAY VIEWS OF THE RIGHT WRIST 8/18/2019 9:08 am COMPARISON: None. HISTORY: ORDERING SYSTEM PROVIDED HISTORY: fall TECHNOLOGIST PROVIDED HISTORY: Reason for exam:->fall Reason for Exam: injury;fall Acuity: Acute Type of Exam: Initial FINDINGS: Osteopenia. Mild-to-moderate degenerative changes the CMC joints. Chondrocalcinosis at the TFCC. No acute fracture. No acute fracture. Ct Head Wo Contrast    Result Date: 8/20/2019  EXAMINATION: CT OF THE HEAD WITHOUT CONTRAST  8/19/2019 10:28 pm TECHNIQUE: CT of the head was performed without the administration of intravenous contrast. Dose modulation, iterative reconstruction, and/or weight based adjustment of the mA/kV was utilized to reduce the radiation dose to as low as reasonably achievable. COMPARISON: 08/18/2019 HISTORY: ORDERING SYSTEM PROVIDED HISTORY: Unwitnessed fall TECHNOLOGIST PROVIDED HISTORY: Has a \"code stroke\" or \"stroke alert\" been called? ->No Reason for Exam: Unwitnessed fall Acuity: Acute Type of Exam: Initial Mechanism of Injury: Unwitnessed fall Relevant Medical/Surgical History: none FINDINGS: BRAIN/VENTRICLES: There is no acute intracranial hemorrhage, mass effect or midline shift. No abnormal extra-axial fluid collection. No evidence of recent territorial infarct.   Chronic right basal STRUCTURES/VENTRICLES: The study is degraded by motion artifact. There is an acute lacunar infarct within the left lentiform nucleus. Volume loss is noted with mild chronic white matter microvascular ischemic disease characterized by periventricular white matter signal abnormality. No mass, shift, or bleed is visualized. There is a chronic lacunar infarct within the cerebellum. ORBITS: The visualized portion of the orbits demonstrate no acute abnormality. SINUSES: The visualized paranasal sinuses and mastoid air cells are well aerated. BONES/SOFT TISSUES: The bone marrow signal intensity appears normal. The soft tissues demonstrate no acute abnormality. MRA HEAD: ANTERIOR CIRCULATION: The internal carotid arteries are normal in course and caliber without focal stenosis. The anterior cerebral and middle cerebral arteries demonstrate no focal stenosis. Bilateral posterior communicating arteries are present. POSTERIOR CIRCULATION: The distal vertebral arteries have largely been excluded from view. The basilar artery is very small, probably a normal variation due the presence of prominent posterior communicating arteries. There may be a severe focal stenosis of the mid basilar artery on image number 38 of series 5. The posterior cerebral arteries are normal in course and caliber to the extent visualized. ANEURYSM: No intracranial aneurysm is seen. Acute lacunar infarct within the left lentiform nucleus. No large vessel occlusion detected. Questionable severe focal stenosis of the mid basilar artery versus artifact. The findings were sent to the Radiology Results [address] at 12:47 pm on 8/19/2019to be communicated to a licensed caregiver.      Ct Abdomen Pelvis W Iv Contrast    Result Date: 8/18/2019  EXAMINATION: CT OF THE ABDOMEN AND PELVIS WITH CONTRAST 8/18/2019 10:16 am TECHNIQUE: CT of the abdomen and pelvis was performed with the administration of intravenous contrast. Multiplanar reformatted

## 2019-08-24 PROBLEM — I63.9 ISCHEMIC STROKE (HCC): Status: ACTIVE | Noted: 2019-08-24

## 2019-08-24 LAB
ANION GAP SERPL CALCULATED.3IONS-SCNC: 12 MMOL/L (ref 4–16)
BUN BLDV-MCNC: 18 MG/DL (ref 6–23)
CALCIUM SERPL-MCNC: 9.3 MG/DL (ref 8.3–10.6)
CHLORIDE BLD-SCNC: 98 MMOL/L (ref 99–110)
CO2: 24 MMOL/L (ref 21–32)
CREAT SERPL-MCNC: 0.8 MG/DL (ref 0.6–1.1)
GFR AFRICAN AMERICAN: >60 ML/MIN/1.73M2
GFR NON-AFRICAN AMERICAN: >60 ML/MIN/1.73M2
GLUCOSE BLD-MCNC: 103 MG/DL (ref 70–99)
POTASSIUM SERPL-SCNC: 3.5 MMOL/L (ref 3.5–5.1)
SODIUM BLD-SCNC: 134 MMOL/L (ref 135–145)

## 2019-08-24 PROCEDURE — 97535 SELF CARE MNGMENT TRAINING: CPT

## 2019-08-24 PROCEDURE — 80048 BASIC METABOLIC PNL TOTAL CA: CPT

## 2019-08-24 PROCEDURE — 97110 THERAPEUTIC EXERCISES: CPT

## 2019-08-24 PROCEDURE — 36415 COLL VENOUS BLD VENIPUNCTURE: CPT

## 2019-08-24 PROCEDURE — 1280000000 HC REHAB R&B

## 2019-08-24 PROCEDURE — 6370000000 HC RX 637 (ALT 250 FOR IP): Performed by: HOSPITALIST

## 2019-08-24 PROCEDURE — 2580000003 HC RX 258: Performed by: PHYSICAL MEDICINE & REHABILITATION

## 2019-08-24 PROCEDURE — 94150 VITAL CAPACITY TEST: CPT

## 2019-08-24 PROCEDURE — 97162 PT EVAL MOD COMPLEX 30 MIN: CPT

## 2019-08-24 PROCEDURE — 6360000002 HC RX W HCPCS: Performed by: PHYSICAL MEDICINE & REHABILITATION

## 2019-08-24 PROCEDURE — 6370000000 HC RX 637 (ALT 250 FOR IP): Performed by: PHYSICAL MEDICINE & REHABILITATION

## 2019-08-24 PROCEDURE — 97530 THERAPEUTIC ACTIVITIES: CPT

## 2019-08-24 PROCEDURE — 97167 OT EVAL HIGH COMPLEX 60 MIN: CPT

## 2019-08-24 RX ORDER — SODIUM CHLORIDE 0.9 % (FLUSH) 0.9 %
10 SYRINGE (ML) INJECTION 2 TIMES DAILY
Status: DISCONTINUED | OUTPATIENT
Start: 2019-08-24 | End: 2019-09-03

## 2019-08-24 RX ORDER — ASPIRIN 81 MG/1
81 TABLET, CHEWABLE ORAL DAILY
Status: DISCONTINUED | OUTPATIENT
Start: 2019-08-24 | End: 2019-09-10 | Stop reason: HOSPADM

## 2019-08-24 RX ORDER — CLOPIDOGREL BISULFATE 75 MG/1
75 TABLET ORAL DAILY
Status: DISCONTINUED | OUTPATIENT
Start: 2019-08-24 | End: 2019-09-10 | Stop reason: HOSPADM

## 2019-08-24 RX ADMIN — CALCIUM POLYCARBOPHIL 625 MG TABLET 625 MG: at 10:14

## 2019-08-24 RX ADMIN — FAMOTIDINE 20 MG: 20 TABLET ORAL at 10:14

## 2019-08-24 RX ADMIN — DOCUSATE SODIUM 100 MG: 100 CAPSULE, LIQUID FILLED ORAL at 10:14

## 2019-08-24 RX ADMIN — ACETAMINOPHEN 650 MG: 325 TABLET ORAL at 20:40

## 2019-08-24 RX ADMIN — DOCUSATE SODIUM 100 MG: 100 CAPSULE, LIQUID FILLED ORAL at 20:40

## 2019-08-24 RX ADMIN — LABETALOL HYDROCHLORIDE 100 MG: 100 TABLET, FILM COATED ORAL at 10:14

## 2019-08-24 RX ADMIN — PRAVASTATIN SODIUM 10 MG: 10 TABLET ORAL at 20:40

## 2019-08-24 RX ADMIN — MEMANTINE HYDROCHLORIDE 5 MG: 5 TABLET ORAL at 16:51

## 2019-08-24 RX ADMIN — LISINOPRIL 20 MG: 20 TABLET ORAL at 10:14

## 2019-08-24 RX ADMIN — ASPIRIN 81 MG 81 MG: 81 TABLET ORAL at 10:14

## 2019-08-24 RX ADMIN — Medication 10 ML: at 10:15

## 2019-08-24 RX ADMIN — Medication 10 ML: at 20:41

## 2019-08-24 RX ADMIN — CLOPIDOGREL BISULFATE 75 MG: 75 TABLET ORAL at 10:14

## 2019-08-24 RX ADMIN — LABETALOL HYDROCHLORIDE 100 MG: 100 TABLET, FILM COATED ORAL at 20:40

## 2019-08-24 RX ADMIN — POTASSIUM CHLORIDE 20 MEQ: 20 TABLET, EXTENDED RELEASE ORAL at 10:14

## 2019-08-24 RX ADMIN — HYDROCHLOROTHIAZIDE 25 MG: 25 TABLET ORAL at 10:14

## 2019-08-24 RX ADMIN — ENOXAPARIN SODIUM 30 MG: 30 INJECTION SUBCUTANEOUS at 10:15

## 2019-08-24 RX ADMIN — POLYETHYLENE GLYCOL (3350) 17 G: 17 POWDER, FOR SOLUTION ORAL at 10:15

## 2019-08-24 ASSESSMENT — PAIN SCALES - GENERAL
PAINLEVEL_OUTOF10: 0

## 2019-08-24 NOTE — PROGRESS NOTES
BSC ontop to increase height)  Bathroom Accessibility: Not accessible(won't be able to use a walker in bathroom)  Home Equipment: Quad cane, Alert [employer] Help From: Family  ADL Assistance: Needs assistance(her dtr assists with bathing, otherwise pt manages ADL)  Bath: Independent  Dressing: Minimal assistance(Daughter lays her clothing out.)  Grooming: Independent  Feeding: Minimal assistance(lately has been requiring additional assistance d/t increase fatigue within the past couple of months)  Toileting: Independent(Mod I )  Homemaking Assistance: Needs assistance(Pt will assist with folding laundry and drying dishes)  Homemaking Responsibilities: No(Pt does not need to complete, but she likes to help her daughter.)  Ambulation Assistance: Independent(CATALINO c cane within house)  Transfer Assistance: Independent  Active : No  Occupation: Retired  Type of occupation: Homemaker   Leisure & Hobbies: Watching game shows; Puzzle books  Additional Comments: Pt's daughter, Reina Turner, manages transportation, cooking, medication, and general supervision of pt. Pt had one fall prior to admission, with an additional occuring during her hospital stay. Objective                                                                                    Goals:  (Update in navigator)  Short term goals  Time Frame for Short term goals: 7 Days  Short term goal 1: Pt will complete supervision feeding   Short term goal 2: Pt will complete supervision grooming   Short term goal 3: Pt will complete CGA bathing   Short term goal 4: Pt will complete Min A UB dressing   Short term goal 5: Pt will complete Min A LB dressing   Short term goal 6: Pt will complete SBA toileting   Short term goal 7: Pt will complete SBA transfers (chair, bed, shower):   Long term goals  Time Frame for Long term goals : Until met or pt is discharged  Long term goal 1: Pt will complete Mod I feeding with adaptive equipment   Long term goal 2: Pt will complete

## 2019-08-24 NOTE — PLAN OF CARE
Monitoring of Risks of Complications   [x] DVT Prophylaxis    [x] Fluid/electrolyte/Nutrition Management    [x] Complete education with patient/family with understanding demonstrated for          in-room safety with transfers to bed, toilet, wheelchair, shower as well as                bathroom activities and hygiene. [x] Adjustment   [] Other:   Nursing interventions may include bowel/bladder training, education for medical assistive devices, medication education, O2 saturation management, energy conservation, stress management techniques, fall prevention, alarms protocol, seating and positioning, skin/wound care, pressure relief instruction,dressing changes,  infection protection, DVT prophylaxis, and/or assistance with in room safety with transfers to bed, toilet, wheelchair, shower as well as bathroom activities and hygiene.      Patient/caregiver education for:   [x] Disease/sustained injury/management      [x] Medication Use   [] Surgical intervention   [x] Safety/Precautions   [x] Body mechanics and or joint protection   [x] Health maintenance         PHYSICAL THERAPY:  Goals:                  Short term goals  Time Frame for Short term goals: 7 days  Short term goal 1: Pt will perform bed mobility with Min A, demonstrating improved sequencing and coordination   Short term goal 2: Pt will perform sit/stand transfers with Min A up to a FWW, demonstrating improved upright posture   Short term goal 3: Pt will ambulate 100 ft, with a FWW CGA demonstrating improved overall endurance and activity tolerance   Short term goal 4: Pt will perform advanced ambulation, including ascending/descending a curb step and navigating uneven surfaces with Min A   Short term goal 5: Pt will ascend/descend 5 steps with CGA and bilateral handrails             Long term goals  Time Frame for Long term goals : 7-12 days  Long term goal 1: Pt will be SBA with bed mobility, with the exception of rolling   Long term goal 2: Pt will perform OOB transfers and car transfer with SBA, using LRAD   Long term goal 3: Pt will ambulate 150 ft with LRAD, demonstrating improved endurance and safety awareness throughout functional tasks   Long term goal 4: Pt will perform advanced ambulation, including ascending a curb step, navigating uneven surfaces and advanced standing balance activities including picking an object off the ground with SBA  Long term goal 5: Pt will ascend/descend 12 steps with CGA  These goals were reviewed with this patient at the time of assessment and Estella Morales is in agreement. Plan of Care: Pt to be seen 5 days per week for a minimum of 60 minutes for 12 days. Current Treatment Recommendations: Strengthening, Functional Mobility Training, Home Exercise Program, Equipment Evaluation, Education, & procurement, Manual Therapy - Soft Tissue Mobilization, Manual Therapy - Joint Manipulation, Cognitive/Perceptual Training, Gait Training, Transfer Training, ROM, Balance Training, Endurance Training, Stair training, Safety Education & Training, Modalities, Positioning, Pain Management, Patient/Caregiver Education & Training, Neuromuscular Re-education community reintegration,and concurrent/group therapy. OCCUPATIONAL THERAPY:  Goals:             Short term goals  Time Frame for Short term goals: 7 Days  Short term goal 1: Pt will complete supervision feeding   Short term goal 2: Pt will complete supervision grooming   Short term goal 3: Pt will complete CGA bathing   Short term goal 4: Pt will complete Min A UB dressing   Short term goal 5: Pt will complete Min A LB dressing   Short term goal 6: Pt will complete SBA toileting   Short term goal 7: Pt will complete SBA transfers (chair, bed, shower) :   Long term goals  Time Frame for Long term goals : Until met or pt is discharged  Long term goal 1: Pt will complete Mod I feeding with adaptive equipment   Long term goal 2: Pt will complete independent grooming LTG:                           Treatments may include speech/language/communication therapy, cognitive training, group therapy, education, and/or dysphagia therapy based on the above goals. Co-treats where appropriate with PT or OT to facilitate patient goals in functional tasks. These goals were reviewed with this patient at the time of assessment and Temo Mckenna is in agreement. CASE MANAGEMENT:  Goals:   Assist patient/family with discharge planning, patient/family counseling, assistance in obtaining recommended equipment and other services, and coordination with insurance during ARU stay. Patient Goals: Patient will achieve maximum independence and return home with family support. Nutrition goal: Patient will consume at least 50-75% at meals during stay       Current  FIMS and Goals:   SELF-CARE  Eatin - Feeds self with setup/supervision/cues and/or requires only setup/supervision/cues to perform tube feedings  GOAL: Eatin  Groomin - Able to perform 3-4 tasks with touching help  GOAL: Groomin  Bathin - Able to bathe 8-9 areas  GOAL: Bathin  Dressing-Upper: 3 - Requires assist with 2 tasks  GOAL: Dressing-Upper: 5  Dressing-Lower: 2 - Requires assist with 4-5 parts of dressing  GOAL: Dressing-Lower: 5  Toiletin - Requires steadying assistance only  GOAL: Toiletin  Bowel Level of Assistance: 6- Requires device like bedpan, diaper, bedside commode, but patient obtains and empties own device including colostomy.   Or requires bowel management medication including stool softeners, laxatives, inserts own suppository  TRANSFERS  Bed, Chair, Wheel Chair: 2 - Requires 50-74% assistance to transfer  GOAL: Bed, Chair, Wheel Chair: 5  Toilet Transfer: 4 - Requires steadying assistance only < 25% assist  GOAL: Toilet: 5  Primary Mode: Shower  GOAL: Tub, Shower: 5  Tub Transfer: 0 - Activity does not occur  Shower Transfer: 0 - Activity does not occur  SPHINCTER CONTROL  Bladder Level of Assistance: 6- Requires bedpan, urinal, diaper, catheter but patient obtains and empties own device. Or requires bladder management medication  Bladder Frequency of Accidents: 6 - No accidents,uses device like urinal, bedpan, absorbant pad, or requires medication to manage bladder  Bowel Level of Assistance: 6- Requires device like bedpan, diaper, bedside commode, but patient obtains and empties own device including colostomy.   Or requires bowel management medication including stool softeners, laxatives, inserts own suppository  Bowel Frequency of Accidents: 6 - No accidents, uses device like bedpan, diaper, bedside commode colostomy, or requires medication to manage bowels  LOCOMOTION  Primary Mode: Walk  GOAL: Walk/Wheel Chair: 5  Distance Walked: 130  Walk: 2 - Maximal Assistance Requires up to Maximal Assistance AND requires assistance of one person to walk between  feet (Patient performs 25-49% of locomotion effort or goes between  feet)  Stairs: 2- Maximal Assistance Performs 25-49% of the effort to go up and down 4 to 6 stairs and requires the assistance of one person only  GOAL: Stairs: 4  COMMUNICATION  Comprehension: 5 - Patient understands basic needs (hungry/hot/pain)  GOAL: Comprehension: 5  Expression: 3 - Expresses basic ideas/needs 50-74% of the time  GOAL: Expression: 5  SOCIAL COGNITION  Social Interaction: 6 - Patient requires medication for mood and/or effect(namenda)  GOAL: Social Interaction: 6  Problem Solving: 3 - Patient solves simple/routine tasks 50%-74%  GOAL: Problem Solvin  Memory: 3 - Patient remembers 50%-74% of the time  GOAL: Memory: 5    Activities Prior to Admit:   Homemaking Responsibilities: No(Pt does not need to complete, but she likes to help her daughter.)  Active : No  Mode of Transportation: Family  Occupation: Retired  Leisure & Hobbies: Watching game shows; [employer], Likes to read, dtr prepares meds in pill box and lays them out for pt. Family assists with finances         Intensity of Therapy  Ivette Kowalski will be seen a minimum of 3 hours of therapy per day/a minimum of 5 out of 7 days per week. [] In this rare instance due to the nature of this patient's medical involvement, this patient will be seen 15 hours per week (900 minutes within a 7 day period). Treatments may include therapeutic exercises, gait training, neuromuscular re-ed, transfer training, community reintegration, bed mobility, w/c mobility and training, self care, home mgmt, cognitive training, energy conservation,dysphagia tx, speech/language/communication therapy, group therapy, and patient/family education. In addition, dietician/nutritionist may monitor calorie count as well as intake and collaboratively work with SLP on dietary upgrades. Neuropsychology/Psychology may evaluate and provide necessary support. Group therapy as appropriate to facilitate improved endurance, STR, COORD, function, safety, transfers, awareness and insight into deficits, problem solving, memory, and social interaction and engagement. Medical issues being managed closely and that require 24 hour availability of a physician:   [x] Swallowing Precautions                                     [] Weight bearing precautions   [] Wound Care                             [x] Infection Prevention   [x] DVT Prophylaxis/assessment              [x] Monitoring for complications    [x] Fall Precautions/Prevention                         [x] Fluid/Electrolyte/Nutrition Balance   [x] Voice Protection                           [x] Medication Management   [x] Respiratory                   [x] Pain Mgmt   [x] Bowel/Bladder Fx    Medical Prognosis: [] Good  [x] Fair    [] Guarded   Total expected IRF days 14                                            Physician anticipated functional outcomes:  FWW and HHC OT/PT and supervision.   Rehab Goals:   [] Return to premorbid function of_______________________________.    [] Independent   [] Mod I  [x] Supervision  [] CGA   [] Min A   [] Mod A  Level for ambulation []without assistive device  [x] with assistive device        [] Independent   [] Mod I  [x] Supervision [] CGA   [] Min A   [] Mod A  Level for transfers []without assistive device  [x] with assistive device         [] Independent   [] Mod I  [x] Supervision [] CGA   [] Min A   [] Mod A Level with ADL's []without assistive device   [x] with assistive device     ___________________     Level with cognitive skills requiring [] No assist [x] Supervision  [] Active Assist/Cues     [] Maximize level of mobility and ADL's to decrease burden on caregiver    IPOC brief synthesis of Preadmission Screen, Post-Admission Evaluation, and Therapy Evaluations: Acute inpatient rehabilitation with occupational and physical therapy 180 minutes 5 out of every 7 days. Will address basic and  advancing mobility with self-care instruction and adaptive equipment training. Caregiver education will be offered. Expected length of stay  prior to a supervised level of function for discharge home with a walker and [facility] OT/PT is 2 weeks.     Additional recommendation:     1. Ischemic stroke with infarction: The patient requires daily occupational and physical therapy with speech-language pathology. She requires a modified diet and swallowing therapies. She requires antiplatelet therapy with baby aspirin and Plavix. GI prophylaxis is offered. Must attend to her elevated blood pressure, obstipation and nutritional support. Evelia hygiene and DVT prophylaxis will be emphasized. 2. DVT prophylaxis: Lovenox 30 mg subcu daily. I must monitor her hemoglobin and platelet count while on this medication. Weightbearing activities will be pursued daily. GI prophylaxis offered. 3. Uncontrolled hypertension: Patient requires labetalol, hydrochlorothiazide and lisinopril. Target systolic blood pressure is 120-140.

## 2019-08-24 NOTE — PROGRESS NOTES
lentiform nucleus, with mid basilar artery artifact. Her chest xray demonstrated mild left basilar atelectasis, due to underlying COPD. Additional PMHx includes HTN, pacemaker, HLD, neuropathy, hypokalemia and hyponatremia. Throughout PT evaluation, her daughter was present and active. This made assessment of cognition and comprehension difficult. When directly talking to the patient, she required increased time to comprehend and visual demonstration. Additionally, she presented with both short and long term memory impairments, with no orientation to place or situation. Functionally, she ranged from Mod-Max A throughout transfers from lower surfaces, even with increased time provided, likely due to impaired BLE strength (no significant difference from R to L). Ambulation and advanced mobility were limited by endurance. Throughout coordination testing, she demonstrated dysmetria with RUE mobility and ataxia with heel to shin testing, this impaired coordination and impaired depth perception was observed with advanced ambulation. She will benefit from skilled PT to address strength, endurance, balance and functional mobility, however goals are set at a SBA to CGA level due to her cognitive and perceptual barriers.      Body structures, Functions, Activity limitations: Decreased functional mobility , Decreased strength, Decreased endurance, Decreased coordination, Decreased vision/visual deficit, Decreased safe awareness, Decreased ROM, Decreased cognition, Decreased balance, Decreased fine motor control, Increased Pain  Treatment Diagnosis: impaired cognition, comprehension, endurance, coordination and BLE strength, impacting balance and independence with functional transfers/ambulation  Prognosis: Good  Decision Making: Medium Complexity  Clinical Presentation: Evolving (D/t PMHx, functional presentation/limitations, social/cognitive barriers)  Patient education:   ARU schedule, ARU expectations for participation, plan week, plus group therapy as appropriate  Plan  Times per week: 5  Times per day: Daily  Plan weeks: 10-12 days  Specific instructions for Next Treatment: Further BLE strength/endurance training   Current Treatment Recommendations: Strengthening, Functional Mobility Training, Home Exercise Program, Equipment Evaluation, Education, & procurement, Manual Therapy - Soft Tissue Mobilization, Manual Therapy - Joint Manipulation, Cognitive/Perceptual Training, Gait Training, Transfer Training, ROM, Balance Training, Endurance Training, Stair training, Safety Education & Training, Modalities, Positioning, Pain Management, Patient/Caregiver Education & Training, Neuromuscular Re-education    PT Individual Minutes  Time In: 1106  Time Out: 4996  Minutes: 76        Timed Code Treatment Minutes: 20 Minutes    Number of Minutes/Billable Intervention  PT Evaluation 56   Gait Training    Therapeutic Exercise    Neuro Re-Ed    Therapeutic Activity 20   Wheelchair Propulsion    Group    Other:    TOTAL 121 Melissa Saldaña PT, DPT #[id number] 4:23 PM 8/24/2019

## 2019-08-24 NOTE — H&P
patient is been independent with her own personal care. She ambulates with a cane. She assist minimally with making her meal prep. Her daughter supervises her medications. She does not drive. She reports that she has never smoked. She has never used smokeless tobacco. She reports that she does not drink alcohol or use drugs. Prior (baseline) level of function: Modified independent with ambulation and independent with her own personal care. Current level of function: Moderate verbal cues and moderate to maximum assistance with mobility and self-care. Allergies:  Patient has no known allergies. Past Medical History:   Past Medical History:   Diagnosis Date    Arthritis     Cerebral artery occlusion with cerebral infarction ([address]     Hyperlipidemia     Hypertension     Impaction of the bowels ([address]     Pacemaker 6/9/16    Medtronic Dual chamber implanted per Vincent Ivy for Asystole for 6 seconds (SSS & intermittent high grade AVB).  SSS (sick sinus syndrome) ([facility] [address] 6/16        Past Surgical History:     Past Surgical History:   Procedure Laterality Date    APPENDECTOMY      BACK SURGERY      denervation x2    COLONOSCOPY      EYE SURGERY Bilateral     cataract removal    HIP SURGERY      JOINT REPLACEMENT      R hip, R knee replacement    KNEE SURGERY      PACEMAKER INSERTION  6/9/16    Medtronic Dual-Chamber pacemaker per Vincent Ivy.     PACEMAKER PLACEMENT      TONSILLECTOMY         Current Medications:     Current Facility-Administered Medications:     [START ON 8/24/2019] famotidine (PEPCID) tablet 20 mg, 20 mg, Oral, Daily, Louana Primrose, MD    [START ON 8/24/2019] hydrochlorothiazide (HYDRODIURIL) tablet 25 mg, 25 mg, Oral, Daily, Louana Primrose, MD    labetalol (NORMODYNE) tablet 100 mg, 100 mg, Oral, BID, Louana Primrose, MD, 100 mg at 08/23/19 1540    [START ON 8/24/2019] lisinopril (PRINIVIL;ZESTRIL) tablet 20 mg, 20 mg, Oral, Daily, Louana Primrose, MD    memantine (NAMENDA) tablet 5 mg, 5 mg, Oral, Dinner, Dante Trevino MD, 5 mg at 08/23/19 1756    [START ON 8/24/2019] polycarbophil (FIBERCON) tablet 625 mg, 625 mg, Oral, Daily, Dante Trevino MD    [START ON 8/24/2019] polyethylene glycol (GLYCOLAX) packet 17 g, 17 g, Oral, Daily, Dante Trevino MD    [START ON 8/24/2019] potassium chloride (KLOR-CON M) extended release tablet 20 mEq, 20 mEq, Oral, Daily with breakfast, Dante Trevino MD    pravastatin (PRAVACHOL) tablet 10 mg, 10 mg, Oral, Nightly, Dante Trevino MD, 10 mg at 08/23/19 2137    acetaminophen (TYLENOL) tablet 650 mg, 650 mg, Oral, Q4H PRN, MISHA Stewart MD, 650 mg at 08/23/19 2232    magnesium hydroxide (MILK OF MAGNESIA) 400 MG/5ML suspension 30 mL, 30 mL, Oral, Daily PRN, MISHA Stewart MD    bisacodyl (DULCOLAX) suppository 10 mg, 10 mg, Rectal, Daily PRN, Dante Trevino MD    docusate sodium (COLACE) capsule 100 mg, 100 mg, Oral, BID, Dante Trevino MD, 100 mg at 08/23/19 2137    Family History:   History reviewed. No pertinent family history. Exam:    Blood pressure (!) 156/71, pulse 87, temperature 98.6 °F (37 °C), temperature source Oral, resp. rate 20, height 5' 7\" (1.702 m), weight 140 lb (63.5 kg), SpO2 98 %. General: Patient is seen semirecumbent in bed. She is alert but quiet. She follows one-step commands but they sometimes have to be repeated. She speaks softly with mild dysarthria. She has difficulty hearing. HEENT: Mild right facial droop. No signs of trauma to her head or neck. Her neck is supple. Mucous membranes are slightly moist.  No adenopathy was noted. Visual fields seem full. Pulmonary: Shallow but clear. Cardiac:  Soft systolic murmur with occasional premature beat. Pacer site is mature. Abdomen: Patient's abdomen was soft and nondistended. Bowel sounds were present throughout. There was no rebound, guarding or masses noted. Spinal exam: She has percussion tenderness at the thoracolumbar junction. Skin is intact.   Trunk rotation is quite

## 2019-08-24 NOTE — PROGRESS NOTES
Occupational Therapy                                                  [facility] OCCUPATIONAL THERAPY EVALUATION    Chart Review:  Past Medical History:   Diagnosis Date    Arthritis     Cerebral artery occlusion with cerebral infarction ([facility] [address]     Hyperlipidemia     Hypertension     Impaction of the bowels ([address]     Pacemaker 6/9/16    Medtronic Dual chamber implanted per Carlleonardo Fleet for Asystole for 6 seconds (SSS & intermittent high grade AVB).  SSS (sick sinus syndrome) ([address] 6/16     Past Surgical History:   Procedure Laterality Date    APPENDECTOMY      BACK SURGERY      denervation x2    COLONOSCOPY      EYE SURGERY Bilateral     cataract removal    HIP SURGERY      JOINT REPLACEMENT      R hip, R knee replacement    KNEE SURGERY      PACEMAKER INSERTION  6/9/16    Medtronic Dual-Chamber pacemaker per Carliss Fleet.  PACEMAKER PLACEMENT      TONSILLECTOMY       Social History:    Lives With: Daughter(dtr lives with pt in pt's house)  Type of Home: House  Home Layout: One level  Home Access: Stairs to enter with rails  Entrance Stairs - Number of Steps: 1 step with L. grab bar  Bathroom Shower/Tub: Walk-in shower, Shower chair without back(4 inch ledge to step over to access walk-in) - Family does have a tub-shower unit  Bathroom Toilet: Handicap height(with BSC ontop to increase height)  Bathroom Accessibility: Not accessible(won't be able to use a walker in bathroom)  Home Equipment: Quad cane, Alert [employer] Help From: Family  ADL Assistance: Needs assistance(her dtr assists with bathing, otherwise pt manages ADL)  Bath: Independent  Dressing: Minimal assistance(Daughter lays her clothing out.  Daughter also checks to ensure bra is on clipped fully in back)  Grooming: Independent  Feeding: Minimal Assistance (Lately has been requiring additional assistance d/t increased fatigue within the past couple of months)  Toileting: Modified Independent  Homemaking Assistance: Needs assistance(Pt Sensation  Overall Sensation Status: WFL    ADL's:    Feeding: Moderate assistance, Increased time to complete, Bringing food to mouth assist, Scoop assist(daughter needed to complete half of feeding d/t increased pt fatigue)  Grooming: Minimal assistance, Increased time to complete, Verbal cueing(Pt needed assistance brushing her hair; Set-up for brushing teeth while siting in the wheelchair)  UE Bathing (sponge bath): Verbal cueing, Increased time to complete, Stand by assistance  LE Bathing (sponge bath): Minimal assistance, Verbal cueing, Increased time to complete(Pt required assistance to wash her buttocks)  UE Dressing: Moderate assistance, Setup, Verbal cueing, Increased time to complete(Pt requiring a variety of assistance d/t the tightness of her clothing; Dep don bra (refused to fasten in the front and rotate to the back), Mod A undershirt; Min A sweater)  LE Dressing: Maximum assistance(Pt able to complete 3/8 tasks d/t increased fatigue; She was able to pull her depends up over bilateral hips and pull her pants over her knees and hips)  Toileting: Contact guard assistance, Increased time to complete(Min verbal cues for hygiene)  Additional Comments: Pt required verbal cuing to initiate task during her ADL evaluation. Family shared that this is normal for pt as she sometimes attends very well and then other times she needs many verbal cues to complete a task  Comment: Throughout ADL session pt was SOB and required Max verbal cues for PLBing. Family shared that pt typically breaths out of her mouth. Rest breaks were provided throughout evaluation. Bed Mobility:    Supine to Sit: Minimal assistance(HOB at 45 degrees; Pt required a hand to pull her UB forward)  Scooting: Contact guard assistance    Transfers:    Sit to stand: Moderate Assistance with Max verbal cues for hand placement (Pt required CGA assistance during transfer from bed & toilet;  After completing her sponge bath, pt required max A for sit <>stand from wheelchair d/t increased fatigue)  Stand to sit: Minimal assistance(with Min verbal cues to descend slowly)  Toilet Transfers  Toilet - Technique: Ambulating  Equipment Used: Raised toilet seat with rails  Toilet Transfer: Contact guard assistance  Tub Transfers  Tub Transfers: Not tested  Shower Transfers  Shower Transfers: Not tested       Functional Mobility:    Balance  Sitting Balance: Stand by assistance  Standing Balance: Contact guard assistance  Standing Balance  Time: ~2 minutes  Activity: Donning depends and pants  Comment: Pt required Min verbal cues for hand placement to improve safety  Functional Mobility  Functional - Mobility Device: Rolling Walker  Activity: To/from bathroom  Assist Level: Contact guard assistance with Mod verbal cues for hand and body placement inside the walker to improve her safety    Cognition:  Overall Cognitive Status: Exceptions  Following Commands:  Follows one step commands with increased time, Follows one step commands with repetition  Attention Span: Difficulty attending to directions  Memory: Decreased recall of precautions, Decreased recall of recent events, Decreased short term memory(Pt was able to answer cognition based questions when provided with two choices)  Safety Judgement: Decreased awareness of need for assistance, Decreased awareness of need for safety(Pt attempting to transfer out of bed independently during the evening hours )  Problem Solving: Assistance required to identify errors made, Assistance required to correct errors made, Decreased awareness of errors, Assistance required to implement solutions, Assistance required to generate solutions  Insights: Decreased awareness of deficits  Initiation: Requires cues for some  Sequencing: Requires cues for some    Perception:  Overall Perceptual Status: Impaired  Initiation: Cues to initiate tasks    Assessment:     The patient is a 80year old female admitted onto ARU after hospitalization goal 2: Pt will complete supervision grooming   Short term goal 3: Pt will complete CGA bathing   Short term goal 4: Pt will complete Min A UB dressing   Short term goal 5: Pt will complete Min A LB dressing   Short term goal 6: Pt will complete SBA toileting   Short term goal 7: Pt will complete SBA transfers (chair, bed, shower)  Long term goals  Time Frame for Long term goals : Until met or pt is discharged  Long term goal 1: Pt will complete Mod I feeding with adaptive equipment   Long term goal 2: Pt will complete independent grooming   Long term goal 3: Pt will complete supervision bathing   Long term goal 4: Pt will complete supervision UB dressing   Long term goal 5: Pt will complete supervision LB dressing   Long term goals 6: Pt will complete supervision toileting   Long term goal 7: Pt will complete supervision transfers (chair, bed, shower)     Plan:    Pt will be seen at least 60 minutes per day for a minimum of 5 days per week, plus group therapy as appropriate       OT Individual Minutes  Time In: 0930  Time Out: 1050  Minutes: 80     Number of Minutes/Billable Intervention  OT Evaluation 15 Minutes   Therapeutic Exercise    ADL Self-care 45 Minutes   Neuro Re-Ed    Therapeutic Activity 20 Minutes   Group    Other:    TOTAL 80 Minutes      Electronically signed by:    Sharri Escalante O.T.    8/24/2019, 12:04 PM

## 2019-08-25 PROCEDURE — 94150 VITAL CAPACITY TEST: CPT

## 2019-08-25 PROCEDURE — 6370000000 HC RX 637 (ALT 250 FOR IP): Performed by: PHYSICAL MEDICINE & REHABILITATION

## 2019-08-25 PROCEDURE — 99232 SBSQ HOSP IP/OBS MODERATE 35: CPT | Performed by: PHYSICAL MEDICINE & REHABILITATION

## 2019-08-25 PROCEDURE — 6370000000 HC RX 637 (ALT 250 FOR IP): Performed by: HOSPITALIST

## 2019-08-25 PROCEDURE — 94761 N-INVAS EAR/PLS OXIMETRY MLT: CPT

## 2019-08-25 PROCEDURE — 1280000000 HC REHAB R&B

## 2019-08-25 PROCEDURE — 6360000002 HC RX W HCPCS: Performed by: PHYSICAL MEDICINE & REHABILITATION

## 2019-08-25 RX ADMIN — CLOPIDOGREL BISULFATE 75 MG: 75 TABLET ORAL at 08:19

## 2019-08-25 RX ADMIN — DOCUSATE SODIUM 100 MG: 100 CAPSULE, LIQUID FILLED ORAL at 08:20

## 2019-08-25 RX ADMIN — POTASSIUM CHLORIDE 20 MEQ: 20 TABLET, EXTENDED RELEASE ORAL at 08:20

## 2019-08-25 RX ADMIN — ACETAMINOPHEN 650 MG: 325 TABLET ORAL at 11:07

## 2019-08-25 RX ADMIN — ASPIRIN 81 MG 81 MG: 81 TABLET ORAL at 08:20

## 2019-08-25 RX ADMIN — LABETALOL HYDROCHLORIDE 100 MG: 100 TABLET, FILM COATED ORAL at 08:20

## 2019-08-25 RX ADMIN — HYDROCHLOROTHIAZIDE 25 MG: 25 TABLET ORAL at 08:20

## 2019-08-25 RX ADMIN — DOCUSATE SODIUM 100 MG: 100 CAPSULE, LIQUID FILLED ORAL at 20:42

## 2019-08-25 RX ADMIN — MEMANTINE HYDROCHLORIDE 5 MG: 5 TABLET ORAL at 17:52

## 2019-08-25 RX ADMIN — POLYETHYLENE GLYCOL (3350) 17 G: 17 POWDER, FOR SOLUTION ORAL at 08:24

## 2019-08-25 RX ADMIN — LABETALOL HYDROCHLORIDE 100 MG: 100 TABLET, FILM COATED ORAL at 20:42

## 2019-08-25 RX ADMIN — LISINOPRIL 20 MG: 20 TABLET ORAL at 08:23

## 2019-08-25 RX ADMIN — CALCIUM POLYCARBOPHIL 625 MG TABLET 625 MG: at 08:24

## 2019-08-25 RX ADMIN — PRAVASTATIN SODIUM 10 MG: 10 TABLET ORAL at 20:42

## 2019-08-25 RX ADMIN — ENOXAPARIN SODIUM 30 MG: 30 INJECTION SUBCUTANEOUS at 08:24

## 2019-08-25 RX ADMIN — FAMOTIDINE 20 MG: 20 TABLET ORAL at 08:20

## 2019-08-25 ASSESSMENT — PAIN SCALES - GENERAL
PAINLEVEL_OUTOF10: 0
PAINLEVEL_OUTOF10: 3

## 2019-08-26 LAB
EKG ATRIAL RATE: 73 BPM
EKG DIAGNOSIS: NORMAL
EKG P AXIS: 74 DEGREES
EKG P-R INTERVAL: 210 MS
EKG Q-T INTERVAL: 396 MS
EKG QRS DURATION: 92 MS
EKG QTC CALCULATION (BAZETT): 436 MS
EKG R AXIS: 21 DEGREES
EKG T AXIS: 30 DEGREES
EKG VENTRICULAR RATE: 73 BPM

## 2019-08-26 PROCEDURE — 99232 SBSQ HOSP IP/OBS MODERATE 35: CPT | Performed by: PHYSICAL MEDICINE & REHABILITATION

## 2019-08-26 PROCEDURE — 97110 THERAPEUTIC EXERCISES: CPT

## 2019-08-26 PROCEDURE — 6360000002 HC RX W HCPCS: Performed by: PHYSICAL MEDICINE & REHABILITATION

## 2019-08-26 PROCEDURE — 97530 THERAPEUTIC ACTIVITIES: CPT

## 2019-08-26 PROCEDURE — 6370000000 HC RX 637 (ALT 250 FOR IP): Performed by: HOSPITALIST

## 2019-08-26 PROCEDURE — 92523 SPEECH SOUND LANG COMPREHEN: CPT

## 2019-08-26 PROCEDURE — 97535 SELF CARE MNGMENT TRAINING: CPT

## 2019-08-26 PROCEDURE — 6370000000 HC RX 637 (ALT 250 FOR IP): Performed by: PHYSICAL MEDICINE & REHABILITATION

## 2019-08-26 PROCEDURE — 97116 GAIT TRAINING THERAPY: CPT

## 2019-08-26 PROCEDURE — 1280000000 HC REHAB R&B

## 2019-08-26 RX ADMIN — PRAVASTATIN SODIUM 10 MG: 10 TABLET ORAL at 21:24

## 2019-08-26 RX ADMIN — LABETALOL HYDROCHLORIDE 100 MG: 100 TABLET, FILM COATED ORAL at 21:22

## 2019-08-26 RX ADMIN — DOCUSATE SODIUM 100 MG: 100 CAPSULE, LIQUID FILLED ORAL at 21:24

## 2019-08-26 RX ADMIN — HYDROCHLOROTHIAZIDE 25 MG: 25 TABLET ORAL at 09:38

## 2019-08-26 RX ADMIN — ACETAMINOPHEN 650 MG: 325 TABLET ORAL at 21:25

## 2019-08-26 RX ADMIN — CLOPIDOGREL BISULFATE 75 MG: 75 TABLET ORAL at 09:38

## 2019-08-26 RX ADMIN — CALCIUM POLYCARBOPHIL 625 MG TABLET 625 MG: at 09:38

## 2019-08-26 RX ADMIN — ASPIRIN 81 MG 81 MG: 81 TABLET ORAL at 09:38

## 2019-08-26 RX ADMIN — LISINOPRIL 20 MG: 20 TABLET ORAL at 09:37

## 2019-08-26 RX ADMIN — DOCUSATE SODIUM 100 MG: 100 CAPSULE, LIQUID FILLED ORAL at 09:38

## 2019-08-26 RX ADMIN — LABETALOL HYDROCHLORIDE 100 MG: 100 TABLET, FILM COATED ORAL at 09:37

## 2019-08-26 RX ADMIN — POTASSIUM CHLORIDE 20 MEQ: 20 TABLET, EXTENDED RELEASE ORAL at 09:38

## 2019-08-26 RX ADMIN — FAMOTIDINE 20 MG: 20 TABLET ORAL at 09:38

## 2019-08-26 RX ADMIN — POLYETHYLENE GLYCOL (3350) 17 G: 17 POWDER, FOR SOLUTION ORAL at 09:38

## 2019-08-26 RX ADMIN — MEMANTINE HYDROCHLORIDE 5 MG: 5 TABLET ORAL at 17:46

## 2019-08-26 RX ADMIN — ENOXAPARIN SODIUM 30 MG: 30 INJECTION SUBCUTANEOUS at 09:38

## 2019-08-26 ASSESSMENT — PAIN SCALES - GENERAL: PAINLEVEL_OUTOF10: 3

## 2019-08-26 NOTE — PROGRESS NOTES
YVROSE talked with patient's daughter, Lakesha Barnett, regarding team meeting and planning for discharge. She confirms that they plan to care for patient at home. She also reports that patient has a longterm care insurance that may assist with the cost of hired caregivers at home.   YVROSE Corbin/GABRIELLE  8/26/2019, 1:22 PM

## 2019-08-26 NOTE — PROGRESS NOTES
Speech Language Pathology  Facility/Department: [facility] ARU  Initial Speech/Language/Cognitive Assessment    NAME: Jax Benoit  : 1921   MRN: [id number]  ADMISSION DATE: 2019  ADMITTING DIAGNOSIS: has Atrial fibrillation ([facility] [address] Hypokalemia; Pacemaker; Obstipation; Ischemic stroke ([facility] [address] Spastic hemiparesis of right dominant side due to acute cerebral infarction [facility]); Dysphagia due to recent cerebral infarction; Dysarthria due to acute stroke ([facility] [address] KEYONA (acute kidney injury) ([facility] [address] and Essential hypertension on their problem list.  DATE ONSET: 19    Date of Eval: 2019   Evaluating Therapist: KENNETH Rodriguez    RECENT RESULTS  CT OF HEAD/MRI:      FINDINGS:   BRAIN/VENTRICLES: There is no acute intracranial hemorrhage, mass effect or   midline shift.  No abnormal extra-axial fluid collection.  No evidence of   recent territorial infarct.  Chronic right basal ganglia infarct.  Evolving   infarct in the left basal ganglia.  No evidence of hemorrhagic   transformation.  There are nonspecific areas of hypoattenuation in the   periventricular white matter and centrum semiovale that are likely related to   chronic small vessel ischemic disease.   The ventricles and cisternal spaces   are prominent consistent with cerebral atrophy.  There is no evidence of   hydrocephalus. There is intracranial atherosclerosis.       ORBITS: The visualized portion of the orbits demonstrate no acute abnormality.       SINUSES: The visualized paranasal sinuses and mastoid air cells demonstrate   no acute abnormality.       SOFT TISSUES/SKULL:  No acute abnormality of the visualized skull or soft   tissues.           Impression   1. No acute intracranial abnormality. 2. Evolving infarct in the left basal ganglia.  No evidence of hemorrhagic   transformation. Primary Complaint: Pt [place]. Family reports pt has trouble formulating and getting out words.      Pain:  Pain Assessment  Pain Assessment: 0-10  Pain Level: 0  Response to Pain Intervention: Asleep with RR greater than 10    Assessment: This 81 yo female was admitted with CVA involving L basal ganglia. Pt arrived with altered mental status, slight facial droop, slurred speech, HTN, SSS s/p Pacer, Neuropathy. Pt was seen by PCP with dx of TIA and went home with symptoms. Pt fell in bathroom without LOC Pt was living independently up to a year ago when dtr moved in to provide additional assist.   Cognitive Diagnosis: Ongoing assessment in tx--Pt with STM deficits, reduced problem solving, reduced insight, slow initiation and processing  Aphasia Diagnosis: Mild to moderate expressive aphasia characterized by paraphasias (verbal) and word retrieval delays. Communication Diagnosis: Mild dysarthria with R Labial facial and lingual weakness, very reduced vocal volume. Recommendations:                Plan:   Goals:  Short-term Goals  Timeframe for Short-term Goals: 5x/week x2 weeks 30 mins min  LTG: Improve expressive communication in conversation to FIM 5.  Goal 1: Ongoing assessment of swallowing by 8/28/19 for possible diet upgrade. Goal 2: Pt will use strategies to improve word retrieval in structured tasks with mod cues and 80% acc. Goal 3: Pt will improve vocal volume and range via sustained phonation tasks, incentive spirometer, and pitch variation tasks for improved communication to 90% intelligible for basic conversation. Goal 4: Ongoing assessment of cognition and memory. Patient/family involved in developing goals and treatment plan: Pt and family in agreement    Subjective:   Previous level of function and limitations: Some assist from dtr who lives with pt for higher cog tasks for meds, finances, transportation.   General  Chart Reviewed: Yes  Family / Caregiver Present: Yes(Son)  Subjective  Subjective: Alert and cooperative; [place]--son present  Social/Functional History  Active : No  Mode of Transportation: Family  Occupation:

## 2019-08-26 NOTE — PROGRESS NOTES
for rest. It was found to be 118/60 mmHg. Pain level/location:           Location:     Discharge recommendations  Destination: Home with Home health PT, 24 hour supervision or assist   Equipment needs: Other: FWW      Bed Mobility:  []   Pt received out of bed      Alteration/AD:    []  Mat   []  HOB elevated  [x]  Use of Handrails     - on the R  Rolling R/L:  SBA (HOB slightly elevated)  Scooting:  SBA  Supine --> Sit:  SBA  Sit --> Supine:  Min A     Transfers:    Sit--> Stand:  CGA (from EOB) up to Max A   Stand --> Sit:   CGA  Stand-Pivot:    Mod-Max with max VC and assist required to manage FWW  Other: PM Toileting: x 2 sit to stands at toilet Mod A for second trial, Clothing management - required assist Min A to doff/don, performed twice  Assistive device required for transfer:   RW    Gait:    Distance:  130 + 10 PM: 60 ft + 12 ft in room feet  Assistance:  CGA  Device:  RW   Gait Quality:  Path deviation to the R with non-reciprocal gait, \"step-to\" pattern initiating with the RLE; Pt demonstrating increased fatigue becoming FWD bent on FWW with assist to sit back down in chair     PM: Short distance ambulation between chair with VC for \"big\" steps and VC to navigating around obstacles   - 10 ft x 4,   Navigating around obstacles in dining room 40 ft with CGA to Min A to assist to negotiate obstacles     Additional Therapeutic activities/exercises completed this date:     []   Nu-step:  Time:        Level:         #Steps:       []   Rebounder:    []  Seated     []  Standing        []   Balance training :       []   Postural training    []   Supine ther ex (reps/sets):     [x]   Seated ther ex (reps/sets):  10 x 2: Marching in place, LAQ    []   Standing ther ex (reps/sets):     []   Picked up object from floor     Assist Level:                     []   Reacher used   []   Other:   [x]   Other: Standing endurance/balance exercises initiated, however pt closing eyes and falling asleep even in standing, [x]   Other:  Increased time required throughout PM session for frequent rest breaks and toileting; Pt initiated toileting this date    Comments:      Patient/Caregiver Education and Training:   [x]   Bed Mobility/Transfer technique/safety  [x]   Gait technique/sequencing  [x]   Proper use of assistive device  []   Advanced mobility safety and technique  [x]   Reinforced patient's precautions/mobility while maintaining precautions  [x]   Postural awareness  []   Family training  [x]   Progress was updated and reviewed in Rehabtracker with patient and/or family this  8/26/2019       date. Treatment Plan for Next Session: Further standing balance/endurance, BLE strenghtening, Safety with transfers,       Assessment:  Ms. Aristeo Raygoza was limited throughout her AM session by increased fatigue, with minimal engagement despite max encouragement and assist. Throughout her PM session, she was more alert, however was very fatigued after toileting. With ambulation and activity, she demonstrated more R sided neglect and reduced R foot clearance, with a shuffling gait pattern. She benefitted from [facility] for \"big\" steps and to lift her R foot. Ms. Aristeo Raygoza will continue to benefit from PT to address endurance, BLE strength and balance, however due to her memory impairments and impaired problem solving, she will likely require 24 hr sup upon discharge.       Treatment/Activity Tolerance:   [] Tolerated treatment with no adverse effects    [x] Patient limited by fatigue  [] Patient limited by pain   [] Patient limited by medical complications:    [] Adverse reaction to Tx:   [] Significant change in status    Safety:      Left up in:   [x]  bed       []  chair        Alarm:    [x] Alarm set      []  Pt refused alarms                [x]  Telesitter activated      [x]  Gait belt used during tx session      []Nurse notified      Number of Minutes/Billable Intervention  Therapeutic Exercise 10    Neuro Re-Ed    Gait 20+ 30   Therapeutic Activity

## 2019-08-26 NOTE — PROGRESS NOTES
FiberCon on the medical floor but the order for MiraLAX cane with her. She is on a stool softener. Will encourage her regular oral intake, physical activity and hydration.

## 2019-08-26 NOTE — PROGRESS NOTES
completed this date:     [x]   ADL Training   [x]   Balance/Postural training: Stood at sink ~5 mins for hand hygiene after toileting immediately proceeding to oral hygiene     [x]   Bed/Transfer Training   [x]   Endurance Training   []   Neuromuscular Re-ed   []   Nu-step:  Time:        Level:         #Steps:       []   Rebounder:    []  Seated     []  Standing        []   Supine Ther Ex (reps/sets):     []   Seated Ther Ex (reps/sets):     []   Standing Ther Ex (reps/sets):     []   Other:      Comments: All intervention performed to increase pt's endurance, ax tolerance, balance, and I c ADLs/IADLs and functional transfers/mobility. Patient/Caregiver Education and Training:   []   [employer] Equipment Use  [x]   Bed Mobility/Transfer Technique/Safety  []   Energy Conservation Tips  []   Family training  [x]   Postural Awareness  [x]   Safety During Functional Activities  []   Reinforced Patient's Precautions   []   Progress was updated and reviewed in Rehabtracker with patient and/or family this         date. Treatment Plan for Next Session: Continue OT POC, trial long handled shoe horn and sponge    Assessment:  R inattention and RUE coordination deficits were evident during ADLs today. Educated daughter on recommendation for visitors to sit on pt's R side to encourage R visual scanning.         Treatment/Activity Tolerance:   [x] Tolerated treatment with no adverse effects    [] Patient limited by fatigue  [] Patient limited by pain   [] Patient limited by medical complications:    [] Adverse reaction to Tx:   [] Significant change in status    Safety:       []  bed alarm set    [x]  chair alarm set    []  Pt refused alarms                [x]  Telesitter activated      [x]  Gait belt used during tx session      []other:       Number of Minutes/Billable Intervention  Therapeutic Exercise    ADL Self-care 65   Neuro Re-Ed    Therapeutic Activity    Group    Other:    TOTAL 65       Social

## 2019-08-26 NOTE — PROGRESS NOTES
Lisa Castillo    : 1921  Acct #: [de-identified]  MRN: [id number]              PM&R Progress Note      Admitting diagnosis: Ischemic stroke with infarction. Comorbid diagnoses impacting rehabilitation: Right hemiparesis, dysarthria, dysphagia, dementia with cognitive impairment, acute kidney injury, hypertension, obstipation.     Chief complaint: Minimal verbalizations, does not appear to be in any distress. Prior (baseline) level of function: Independent. Current level of function:     Physical therapy:  Bed Mobility: Scooting: Contact guard assistance  Transfers: Sit to Stand:  Moderate Assistance, Maximum Assistance  Stand to sit: Minimal Assistance  Bed to Chair: Moderate assistance(Increased steadying required due to fatigue), Ambulation 1  Surface: level tile  Device: Rolling Walker  Assistance: Minimal assistance  Quality of Gait: forefoot at initial contact with LLE, FWD bent posture, increased unsteadiness with fatigue and resp rate; reduced step length with shuffling gait pattern   Distance: 10 ft (Min A for steadying) + 40 ft (max effort, with Min A) , Stairs  # Steps : 5  Stairs Height: (4/6)  Rails: Bilateral(Min A ascending, Mod A descending with pt using TC input in order to clear step)  Curbs: 4\"(FWW, Min A)  Mobility: Bed, Chair, Wheel Chair: 2 - Requires 50-74% assistance to transfer  Walk: 2 - Maximal Assistance Requires up to Maximal Assistance AND requires assistance of one person to walk between  feet (Patient performs 25-49% of locomotion effort or goes between  feet)  Distance Walked: 130  Stairs: 2- Maximal Assistance Performs 25-49% of the effort to go up and down 4 to 6 stairs and requires the assistance of one person only, PT Equipment Recommendations  Other: FWW,      Occupational therapy: Eatin - Feeds self with setup/supervision/cues and/or requires only setup/supervision/cues to perform tube feedings  Groomin - Able to perform 3-4 tasks with touching her in a calm and consistent environment to help retention.  Trying to establish regular sleep-wake cycles.  Caregiver training will be particularly important through this stay.   6. Obstipation: The patient was on FiberCon on the medical floor but the order for MiraLAX came with her. Gibran Navarro is on a stool softener.  Encouraging regular oral intake, physical activity and hydration.

## 2019-08-26 NOTE — PLAN OF CARE
perceptions  8/26/2019 1435 by Meera Elise RN  Outcome: Ongoing  8/26/2019 1434 by Meera Elise RN  Outcome: Ongoing  Goal: Demonstrates accurate environmental perceptions  Description  Demonstrates accurate environmental perceptions  8/26/2019 1435 by Meera Elise RN  Outcome: Ongoing  8/26/2019 1434 by Meera Elise RN  Outcome: Ongoing  Goal: Able to distinguish between reality-based and nonreality-based thinking  Description  Able to distinguish between reality-based and nonreality-based thinking  8/26/2019 1435 by Meera Elise RN  Outcome: Ongoing  8/26/2019 1434 by Meera Elise RN  Outcome: Ongoing  Goal: Able to interrupt nonreality-based thinking  Description  Able to interrupt nonreality-based thinking  8/26/2019 1435 by Meera Elise RN  Outcome: Ongoing  8/26/2019 1434 by Meera Elise RN  Outcome: Ongoing     Problem: Sleep Pattern Disturbance:  Goal: Appears well-rested  Description  Appears well-rested  8/26/2019 1435 by Meera Elise RN  Outcome: Ongoing  8/26/2019 1434 by Meera Elise RN  Outcome: Ongoing

## 2019-08-27 LAB
ANION GAP SERPL CALCULATED.3IONS-SCNC: 13 MMOL/L (ref 4–16)
BUN BLDV-MCNC: 27 MG/DL (ref 6–23)
CALCIUM SERPL-MCNC: 9.7 MG/DL (ref 8.3–10.6)
CHLORIDE BLD-SCNC: 96 MMOL/L (ref 99–110)
CO2: 26 MMOL/L (ref 21–32)
CREAT SERPL-MCNC: 1.1 MG/DL (ref 0.6–1.1)
GFR AFRICAN AMERICAN: 56 ML/MIN/1.73M2
GFR NON-AFRICAN AMERICAN: 46 ML/MIN/1.73M2
GLUCOSE BLD-MCNC: 102 MG/DL (ref 70–99)
HCT VFR BLD CALC: 39.6 % (ref 37–47)
HEMOGLOBIN: 13.2 GM/DL (ref 12.5–16)
MCH RBC QN AUTO: 30.3 PG (ref 27–31)
MCHC RBC AUTO-ENTMCNC: 33.3 % (ref 32–36)
MCV RBC AUTO: 90.8 FL (ref 78–100)
PDW BLD-RTO: 13.3 % (ref 11.7–14.9)
PLATELET # BLD: 296 K/CU MM (ref 140–440)
PMV BLD AUTO: 10.6 FL (ref 7.5–11.1)
POTASSIUM SERPL-SCNC: 4.1 MMOL/L (ref 3.5–5.1)
RBC # BLD: 4.36 M/CU MM (ref 4.2–5.4)
SODIUM BLD-SCNC: 135 MMOL/L (ref 135–145)
WBC # BLD: 7.4 K/CU MM (ref 4–10.5)

## 2019-08-27 PROCEDURE — 97530 THERAPEUTIC ACTIVITIES: CPT

## 2019-08-27 PROCEDURE — 97112 NEUROMUSCULAR REEDUCATION: CPT

## 2019-08-27 PROCEDURE — 97116 GAIT TRAINING THERAPY: CPT

## 2019-08-27 PROCEDURE — 6370000000 HC RX 637 (ALT 250 FOR IP): Performed by: HOSPITALIST

## 2019-08-27 PROCEDURE — 6370000000 HC RX 637 (ALT 250 FOR IP): Performed by: PHYSICAL MEDICINE & REHABILITATION

## 2019-08-27 PROCEDURE — 6360000002 HC RX W HCPCS: Performed by: PHYSICAL MEDICINE & REHABILITATION

## 2019-08-27 PROCEDURE — 99233 SBSQ HOSP IP/OBS HIGH 50: CPT | Performed by: PHYSICAL MEDICINE & REHABILITATION

## 2019-08-27 PROCEDURE — 97535 SELF CARE MNGMENT TRAINING: CPT

## 2019-08-27 PROCEDURE — 92507 TX SP LANG VOICE COMM INDIV: CPT

## 2019-08-27 PROCEDURE — 36415 COLL VENOUS BLD VENIPUNCTURE: CPT

## 2019-08-27 PROCEDURE — 85027 COMPLETE CBC AUTOMATED: CPT

## 2019-08-27 PROCEDURE — 80048 BASIC METABOLIC PNL TOTAL CA: CPT

## 2019-08-27 PROCEDURE — 94150 VITAL CAPACITY TEST: CPT

## 2019-08-27 PROCEDURE — 94761 N-INVAS EAR/PLS OXIMETRY MLT: CPT

## 2019-08-27 PROCEDURE — 1280000000 HC REHAB R&B

## 2019-08-27 PROCEDURE — 92610 EVALUATE SWALLOWING FUNCTION: CPT

## 2019-08-27 PROCEDURE — 97110 THERAPEUTIC EXERCISES: CPT

## 2019-08-27 RX ADMIN — ASPIRIN 81 MG 81 MG: 81 TABLET ORAL at 08:08

## 2019-08-27 RX ADMIN — DOCUSATE SODIUM 100 MG: 100 CAPSULE, LIQUID FILLED ORAL at 08:08

## 2019-08-27 RX ADMIN — LABETALOL HYDROCHLORIDE 100 MG: 100 TABLET, FILM COATED ORAL at 08:07

## 2019-08-27 RX ADMIN — DOCUSATE SODIUM 100 MG: 100 CAPSULE, LIQUID FILLED ORAL at 21:05

## 2019-08-27 RX ADMIN — POTASSIUM CHLORIDE 20 MEQ: 20 TABLET, EXTENDED RELEASE ORAL at 08:07

## 2019-08-27 RX ADMIN — LISINOPRIL 20 MG: 20 TABLET ORAL at 08:08

## 2019-08-27 RX ADMIN — LABETALOL HYDROCHLORIDE 100 MG: 100 TABLET, FILM COATED ORAL at 21:05

## 2019-08-27 RX ADMIN — HYDROCHLOROTHIAZIDE 25 MG: 25 TABLET ORAL at 08:08

## 2019-08-27 RX ADMIN — CLOPIDOGREL BISULFATE 75 MG: 75 TABLET ORAL at 08:07

## 2019-08-27 RX ADMIN — FAMOTIDINE 20 MG: 20 TABLET ORAL at 08:08

## 2019-08-27 RX ADMIN — MEMANTINE HYDROCHLORIDE 5 MG: 5 TABLET ORAL at 17:19

## 2019-08-27 RX ADMIN — ENOXAPARIN SODIUM 30 MG: 30 INJECTION SUBCUTANEOUS at 08:07

## 2019-08-27 RX ADMIN — PRAVASTATIN SODIUM 10 MG: 10 TABLET ORAL at 21:05

## 2019-08-27 RX ADMIN — POLYETHYLENE GLYCOL (3350) 17 G: 17 POWDER, FOR SOLUTION ORAL at 08:07

## 2019-08-27 RX ADMIN — CALCIUM POLYCARBOPHIL 625 MG TABLET 625 MG: at 08:08

## 2019-08-27 ASSESSMENT — PAIN SCALES - GENERAL: PAINLEVEL_OUTOF10: 0

## 2019-08-27 NOTE — PROGRESS NOTES
Speech Language Pathology  Facility/Department: [facility] [place]   CLINICAL BEDSIDE SWALLOW EVALUATION    NAME: Karlee Tam  : 1921  MRN: [id number]    ADMISSION DATE: 2019  ADMITTING DIAGNOSIS: has Atrial fibrillation ([facility] Hypokalemia; Pacemaker; Obstipation; Ischemic stroke ([facility] Spastic hemiparesis of right dominant side due to acute cerebral infarction [facility]); Dysphagia due to recent cerebral infarction; Dysarthria due to acute stroke ([facility] KEYONA (acute kidney injury) ([facility] and Essential hypertension on their problem list.  ONSET DATE: 19    Recent Chest Xray/CT of Chest:    Date 19  Impression   1. Mild left basilar atelectasis and scarring.  Underlying COPD. 2. No acute focal airspace consolidation. 3. Diffuse osteopenia.  Calcific tendinitis at the left shoulder.         Date of Eval: 2019  Evaluating Therapist: Fabian Stokes    Current Diet level:  Current Diet : Dysphagia Soft and Bite-Sized (Dysphagia III)  Current Liquid Diet : Thin      Primary Complaint  Patient Complaint: No complaints voiced. Pain:  Pain Assessment  Pain Assessment: 0-10  Pain Level: 3  Response to Pain Intervention: Asleep with RR greater than 10    Reason for Referral  Karlee Tam was referred for a bedside swallow evaluation to assess the efficiency of her swallow function, identify signs and symptoms of aspiration and make recommendations regarding safe dietary consistencies, effective compensatory strategies, and safe eating environment. Impression  This 79 yo female was admitted with CVA involving L basal ganglia. Pt arrived with altered mental status, slight facial droop, slurred speech, HTN, SSS s/p Pacer, Neuropathy. Pt was seen by PCP with dx of TIA and went home with symptoms.   Pt fell in bathroom without LOC Pt was living independently up to a year ago when dtr moved in to provide additional assist.   Dysphagia Diagnosis: Swallow function appears grossly intact  Dysphagia Outcome Severity Scale: Level 7: Normal in all situations     Treatment Plan  Requires SLP Intervention: No(for swallowing)  Duration/Frequency of Treatment: No tx recommended. D/C Recommendations: To be determined  Referral To: Dietician    Recommended Diet and Intervention  Diet Solids Recommendation: Regular  Liquid Consistency Recommendation: Thin  Recommended Form of Meds: Whole with water  Recommendations: (Assist as needed with set up)       Compensatory Swallowing Strategies  Compensatory Swallowing Strategies: Alternate solids and liquids;Upright as possible for all oral intake    Treatment/Goals  Long-term Goals  Timeframe for Long-term Goals: No swallow tx recommended. General  Chart Reviewed: Yes  Behavior/Cognition: Alert; Cooperative; Requires cueing  Respiratory Status: Room air  O2 Device: None (Room air)  Communication Observation: Dysarthria  Follows Directions: Simple  Dentition: Adequate  Patient Positioning: Upright in chair  Baseline Vocal Quality: Normal  Volitional Cough: Strong  Prior Dysphagia History: dtr reports no dysphagia however pt is very slow to eat and has been her whole life. Consistencies Administered: Reg solid; Dysphagia Soft and Bite-Sized (Dysphagia III); Dysphagia Pureed (Dysphagia I); Thin - cup; Thin - straw           Vision/Hearing  Vision  Vision: Impaired  Vision Exceptions: Wears glasses at all times  Hearing  Hearing: Exceptions to [facility]  Hearing Exceptions: Bilateral hearing aid;Hard of hearing/hearing concerns    Oral Motor Deficits  Oral/Motor  Labial ROM: Reduced left  Labial Symmetry: Abnormal symmetry left    Oral Phase Dysfunction  Oral Phase  Oral Phase: WFL  Oral Phase  Oral Phase - Comment: WFL--prolonged mastication which dtr indicated is normal for pt     Indicators of Pharyngeal Phase Dysfunction   Pharyngeal Phase  Pharyngeal Phase: WFL  Pharyngeal Phase   Pharyngeal: WFL    Prognosis  Prognosis  Prognosis for safe diet advancement: good  Individuals

## 2019-08-27 NOTE — PROGRESS NOTES
[]   Supine Ther Ex (reps/sets):     [x]   Seated Ther Ex (reps/sets): To increase BUE endurance for ADLs and transfers, pt completed arm ergometer on min resistance x5 mins c 0 rest breaks     To increase intrinsic hand strength, pt completed therex c yellow theraputty targeting gross grasp/release, in hand rotation and tip pinch.       []   Standing Ther Ex (reps/sets):     []   Other:      Comments: All intervention performed to increase pt's endurance, ax tolerance, balance, and I c ADLs/IADLs and functional transfers/mobility. Patient/Caregiver Education and Training:   []   [employer] Equipment Use  [x]   Bed Mobility/Transfer Technique/Safety  []   Energy Conservation Tips  []   Family training  [x]   Postural Awareness  [x]   Safety During Functional Activities  []   Reinforced Patient's Precautions   []   Progress was updated and reviewed in Rehabtracker with patient and/or family this         date. Treatment Plan for Next Session: Continue OT POC     Assessment:  Pt engaged well in treatment today with rest breaks PRN d/t fatigue.       Treatment/Activity Tolerance:   [x] Tolerated treatment with no adverse effects    [] Patient limited by fatigue  [] Patient limited by pain   [] Patient limited by medical complications:    [] Adverse reaction to Tx:   [] Significant change in status    Safety:       []  bed alarm set    [x]  chair alarm set    []  Pt refused alarms                [x]  Telesitter activated      [x]  Gait belt used during tx session      []other:       Number of Minutes/Billable Intervention  Therapeutic Exercise    ADL Self-care 20   Neuro Re-Ed 20   Therapeutic Activity 23   Group    Other:    TOTAL 63       Social History  Social/Functional History  Lives With: Daughter(dtr lives with pt in pt's house)  Type of Home: House  Home Layout: One level  Home Access: Stairs to enter with rails  Entrance Stairs - Number of Steps: 1 step with L. grab bar  Bathroom Shower/Tub: Walk-in

## 2019-08-27 NOTE — PROGRESS NOTES
Case Management Note     [] Daily  [x] Weekly Care Conference Note  [] Discharge    Patient:Analy Gaytan      :1921  JAO:[id number]  Rehab Dx/Hx: Acute cerebrovascular accident (CVA) ([facility] [I63.9]  Contact Info: (Name/phone):  Date:  2019 Contact:  [] Spouse/Family  [] Insurance    [x] Team Care Conference  [] Patient  [] Other Comments: Patient lives in a single level home. Her daughter has been staying with her for about a year, but has some health issues. Patient has another son and daughter who also assist.  They are also considering hiring additional assistance. Patient requires Mod A with transfers, CGA with ambulation,  Min A with bathing and lower body dressing, and Max A with lower body dressing. Signed:      Date:  2019 Contact:  [x] Spouse/Family  [] Insurance    [] Team Care Conference  [] Patient  [] Other Comments: MSW informed patient's daughter of planned discharge. MSW also highlighted that patient will need supervision for safety at discharge. Equipment Needs       [] Meagan Milling  []  Dylan Hutch 2 W   []  Leonora Mitchell  [] Wheelchair  [] Tub Seat  [] Tub Bench  []    []    []  []    Patient has a shower chair that does not have a back. Anticipated Discharge Date      9/10/2019   Home Eval Scheduled Date:    Additional Therapy Needs after Discharge: [x] [facility]  [] Outpatient  [x] PT     [] Nursing  [x] OT    []  [x] ST           Current level of fx:   Bathing:   Grooming:   Dressing:   Mobility:   Cognition:   Communication:   Swallowing:   Nursing issues:   Signed:  Pepper Castro, 2019, 1:49 PM

## 2019-08-27 NOTE — PROGRESS NOTES
Patient has declined to go to bathroom tonight, depends have remained dry, notifying charge nurse and bladder scan done, value is 290ml.

## 2019-08-28 ENCOUNTER — APPOINTMENT (OUTPATIENT)
Dept: CT IMAGING | Age: 84
DRG: 057 | End: 2019-08-28
Attending: PHYSICAL MEDICINE & REHABILITATION
Payer: MEDICARE

## 2019-08-28 LAB
ANION GAP SERPL CALCULATED.3IONS-SCNC: 12 MMOL/L (ref 4–16)
APTT: 24.4 SECONDS (ref 21.2–33)
BUN BLDV-MCNC: 25 MG/DL (ref 6–23)
CALCIUM SERPL-MCNC: 9.2 MG/DL (ref 8.3–10.6)
CHLORIDE BLD-SCNC: 97 MMOL/L (ref 99–110)
CO2: 24 MMOL/L (ref 21–32)
CREAT SERPL-MCNC: 0.9 MG/DL (ref 0.6–1.1)
GFR AFRICAN AMERICAN: >60 ML/MIN/1.73M2
GFR NON-AFRICAN AMERICAN: 58 ML/MIN/1.73M2
GLUCOSE BLD-MCNC: 125 MG/DL (ref 70–99)
GLUCOSE BLD-MCNC: 128 MG/DL (ref 70–99)
HCT VFR BLD CALC: 37.6 % (ref 37–47)
HEMOGLOBIN: 12.8 GM/DL (ref 12.5–16)
INR BLD: 1 INDEX
MCH RBC QN AUTO: 30.6 PG (ref 27–31)
MCHC RBC AUTO-ENTMCNC: 34 % (ref 32–36)
MCV RBC AUTO: 90 FL (ref 78–100)
PDW BLD-RTO: 13.1 % (ref 11.7–14.9)
PLATELET # BLD: 305 K/CU MM (ref 140–440)
PMV BLD AUTO: 10 FL (ref 7.5–11.1)
POTASSIUM SERPL-SCNC: 3.6 MMOL/L (ref 3.5–5.1)
PROTHROMBIN TIME: 11.6 SECONDS (ref 9.12–12.5)
RBC # BLD: 4.18 M/CU MM (ref 4.2–5.4)
SODIUM BLD-SCNC: 133 MMOL/L (ref 135–145)
TROPONIN T: 0.01 NG/ML
WBC # BLD: 9.5 K/CU MM (ref 4–10.5)

## 2019-08-28 PROCEDURE — 82962 GLUCOSE BLOOD TEST: CPT

## 2019-08-28 PROCEDURE — 6370000000 HC RX 637 (ALT 250 FOR IP): Performed by: HOSPITALIST

## 2019-08-28 PROCEDURE — 36415 COLL VENOUS BLD VENIPUNCTURE: CPT

## 2019-08-28 PROCEDURE — 97116 GAIT TRAINING THERAPY: CPT

## 2019-08-28 PROCEDURE — 80048 BASIC METABOLIC PNL TOTAL CA: CPT

## 2019-08-28 PROCEDURE — 1280000000 HC REHAB R&B

## 2019-08-28 PROCEDURE — 6370000000 HC RX 637 (ALT 250 FOR IP): Performed by: PHYSICAL MEDICINE & REHABILITATION

## 2019-08-28 PROCEDURE — 70450 CT HEAD/BRAIN W/O DYE: CPT

## 2019-08-28 PROCEDURE — 92507 TX SP LANG VOICE COMM INDIV: CPT

## 2019-08-28 PROCEDURE — 84484 ASSAY OF TROPONIN QUANT: CPT

## 2019-08-28 PROCEDURE — 97535 SELF CARE MNGMENT TRAINING: CPT

## 2019-08-28 PROCEDURE — 6360000002 HC RX W HCPCS: Performed by: PHYSICAL MEDICINE & REHABILITATION

## 2019-08-28 PROCEDURE — 97530 THERAPEUTIC ACTIVITIES: CPT

## 2019-08-28 PROCEDURE — 85730 THROMBOPLASTIN TIME PARTIAL: CPT

## 2019-08-28 PROCEDURE — 85027 COMPLETE CBC AUTOMATED: CPT

## 2019-08-28 PROCEDURE — 99232 SBSQ HOSP IP/OBS MODERATE 35: CPT | Performed by: PHYSICAL MEDICINE & REHABILITATION

## 2019-08-28 PROCEDURE — 2580000003 HC RX 258: Performed by: PHYSICAL MEDICINE & REHABILITATION

## 2019-08-28 PROCEDURE — 85610 PROTHROMBIN TIME: CPT

## 2019-08-28 RX ORDER — AMLODIPINE BESYLATE 5 MG/1
5 TABLET ORAL DAILY
Status: DISCONTINUED | OUTPATIENT
Start: 2019-08-28 | End: 2019-08-28

## 2019-08-28 RX ORDER — CALCIUM CARBONATE 200(500)MG
500 TABLET,CHEWABLE ORAL EVERY 6 HOURS PRN
Status: DISCONTINUED | OUTPATIENT
Start: 2019-08-28 | End: 2019-09-10 | Stop reason: HOSPADM

## 2019-08-28 RX ADMIN — CALCIUM POLYCARBOPHIL 625 MG TABLET 625 MG: at 09:25

## 2019-08-28 RX ADMIN — LABETALOL HYDROCHLORIDE 100 MG: 100 TABLET, FILM COATED ORAL at 20:51

## 2019-08-28 RX ADMIN — CALCIUM CARBONATE 500 MG: 500 TABLET, CHEWABLE ORAL at 18:54

## 2019-08-28 RX ADMIN — ACETAMINOPHEN 650 MG: 325 TABLET ORAL at 06:56

## 2019-08-28 RX ADMIN — ENOXAPARIN SODIUM 30 MG: 30 INJECTION SUBCUTANEOUS at 09:25

## 2019-08-28 RX ADMIN — Medication 10 ML: at 20:55

## 2019-08-28 RX ADMIN — Medication 10 ML: at 09:25

## 2019-08-28 RX ADMIN — DOCUSATE SODIUM 100 MG: 100 CAPSULE, LIQUID FILLED ORAL at 09:25

## 2019-08-28 RX ADMIN — ASPIRIN 81 MG 81 MG: 81 TABLET ORAL at 09:25

## 2019-08-28 RX ADMIN — POLYETHYLENE GLYCOL (3350) 17 G: 17 POWDER, FOR SOLUTION ORAL at 09:26

## 2019-08-28 RX ADMIN — LABETALOL HYDROCHLORIDE 100 MG: 100 TABLET, FILM COATED ORAL at 06:57

## 2019-08-28 RX ADMIN — LISINOPRIL 20 MG: 20 TABLET ORAL at 06:56

## 2019-08-28 RX ADMIN — HYDROCHLOROTHIAZIDE 25 MG: 25 TABLET ORAL at 06:56

## 2019-08-28 RX ADMIN — POTASSIUM CHLORIDE 20 MEQ: 20 TABLET, EXTENDED RELEASE ORAL at 09:25

## 2019-08-28 RX ADMIN — CLOPIDOGREL BISULFATE 75 MG: 75 TABLET ORAL at 09:25

## 2019-08-28 RX ADMIN — PRAVASTATIN SODIUM 10 MG: 10 TABLET ORAL at 20:51

## 2019-08-28 RX ADMIN — MEMANTINE HYDROCHLORIDE 5 MG: 5 TABLET ORAL at 17:23

## 2019-08-28 RX ADMIN — FAMOTIDINE 20 MG: 20 TABLET ORAL at 09:25

## 2019-08-28 RX ADMIN — DOCUSATE SODIUM 100 MG: 100 CAPSULE, LIQUID FILLED ORAL at 20:51

## 2019-08-28 NOTE — PROGRESS NOTES
RRT was called this morning due to unresponsiveness. Patient last know well was around 8am and was noted to be suddenly unresponsive when she was talking with her son. No opoiod received recently; last medication taken was Labetalol  She has been on admission in ARU for stroke rehab (had Ischemic stroke with residual Rt sided weakness. Patients nurse reported precipitous drop in her BP from 200 to about low 100's    On PE Patient was able to move the Rt hand   Vitals: 185/61 saturating 99% on RA    Stat CT Brain; No acute intracranial bleed  [facility] was contacted; patient returned to her baseline function by the time [facility] interviewed her. Stroke was ruled out. She was able to follow all commands and move all limbs.      Assessment  -Likely brain hypoperfusion due to abrupt drop in BP  -Patient will be transferred back to ARU to continue therapy  -Keep SBP around 140 and avoid abrupt drop

## 2019-08-28 NOTE — PROGRESS NOTES
additional occuring during her hospital stay. Visits family, plays cards, out to eat, grocery. Objective                                                                                    Goals:  (Update in navigator)  Short term goals  Time Frame for Short term goals: 7 Days  Short term goal 1: Pt will complete supervision feeding   Short term goal 2: Pt will complete supervision grooming   Short term goal 3: Pt will complete CGA bathing   Short term goal 4: Pt will complete Min A UB dressing   Short term goal 5: Pt will complete Min A LB dressing   Short term goal 6: Pt will complete SBA toileting   Short term goal 7: Pt will complete SBA transfers (chair, bed, shower): Long term goals  Time Frame for Long term goals : Until met or pt is discharged  Long term goal 1: Pt will complete Mod I feeding with adaptive equipment   Long term goal 2: Pt will complete independent grooming   Long term goal 3: Pt will complete supervision bathing   Long term goal 4: Pt will complete supervision UB dressing   Long term goal 5: Pt will complete supervision LB dressing   Long term goals 6: Pt will complete supervision toileting   Long term goal 7: Pt will complete supervision transfers (chair, bed, shower) :        Plan of Care                                                                              Times per week: 5 days per week for a minimum of 60 minutes/day plus group as appropriate for 60 minutes.   Treatment to include Plan  Current Treatment Recommendations: Strengthening, Balance Training, Endurance Training, Gait Training, Patient/Caregiver Education & Training, Functional Mobility Training, Cognitive Reorientation, Safety Education & Training, Self-Care / ADL, Positioning, Cognitive/Perceptual Training    Electronically signed by   Arya Rose,  8/28/2019, 9:54 AM

## 2019-08-28 NOTE — PROGRESS NOTES
50% as confirmed by dtr. Worked on use of call button which pt continues to need cues for. Pt with very reduced initiation. Affect is flat but pleasant. Goal 2: Pt will use strategies to improve word retrieval in structured tasks with mod cues and 80% acc. Demonstrated and practiced word retrieval 6 step program for gesture, describe, association, categorization, visualizing, and synonyms. Pt was given 3 specific tasks within each area and asked to utilize after practice and  demonstration by this therapist.  Completed gesture with min cues and 70%, description with moderate cues and 50% acc, and categorization with min cues and 50% acc. Naming of objects was 6/8   Fx required moderate cues and 4/8 50%. Goal 3: Pt will improve vocal volume and range via sustained phonation tasks, incentive spirometer, and pitch variation tasks for improved communication to 90% intelligible for basic conversation. Speech intelligibility exercises were targeted this date via incentive spirometer x 9 with avg achieved at 250ml. Sustained phonation of /a/ using loud vocal volume was completed x5 for 7 secs. Volume was reduced despite cues. Speech was produced at the phrase/sentence level with 50% intelligibility. Cues were needed for volume during entire session. Poor awareness. Timeframe for Short-term Goals: 5x/week x2 weeks 30 mins min  LTG: Improve expressive communication in conversation to FIM 5.                        Alarm placed: []bed [x]chair   []other:   [x]HOWIE activated        Barriers to progress:   [] Fatigue        [x] Cognitive Deficits   [x] Memory Deficits   [x] Reduced Attn   [] Self Limiting Behaviors    [x] Reduced insight/awareness     [] Visual Deficits   [] Premorbid Conditions     [] Other      Education/Interventions used this date: Provided handout and demonstration with practice for language retrieval.     [x]   Progress was updated and reviewed in Rehabtracker with

## 2019-08-28 NOTE — PROGRESS NOTES
[facility]     [x] daily progress note       [] discharge       Patient Name:  Clemente Tinsley   :  1921 MRN: [id number]  Room:  [address] [place], [place] [address]A Date of Admission: 2019  Rehabilitation Diagnosis:   Acute cerebrovascular accident (CVA) ([facility] [I63.9]        Date 2019       Day of ARU Week:  6   Time IN/OUT 1301/1404; Individual Tx Minutes Timed Code Treatment Minutes: 61 Minutes   TOTAL Tx Time Mins  Timed Code Treatment Minutes: 63 Minutes   Variance Time  Due to: +3 Extra time needed to complete task     Restrictions Restrictions/Precautions  Restrictions/Precautions: Fall Risk, Weight Bearing(Hx of pacemaker)      Communication with other providers: [x]   OK to see per nursing:     []   Spoke with team member regarding:      Subjective observations and cognitive status:   Clemente Tinsley was seated up in the bedside chair on PT arrival. she was agreeable to PT treatment session. Finishing up her lunch. She was agreeable to set some of her food aside.     Pain level/location:   Pain Level: (htn)       Location:     Discharge recommendations  Destination: Home with Home health PT, 24 hour supervision or assist   Equipment needs: Other: FWW      Bed Mobility:  [x]   Pt received out of bed        Transfers:    Sit--> Stand:  CGA (min - mod VC for anterior trunk lean)   Stand --> Sit:   CGA  Stand-Pivot:   Up to Mod A d/t uncontrolled descend & safety with turning to the chair  - Blocked training provided from chair to chair, to address hand placement safety with set-up to back up to the chair and reach back x 4 trials, then reinforced throughout the remainder of the treatment session  Other:    Assistive device required for transfer:   RW    Gait:    Distance:  180 feet  Assistance:  CGA up to Mod A   Device:  RW  Gait Quality:  Reduced step length, significantly reduced on RLE with impaired clearance, improved with Mod-Max VC for \"big\" steps     Curb       Assistance:    CGA to Min A on the mobility. Throughout ambulation and standing activities, she demonstrates reduced endurance and activity tolerance with shuffling and reduced step length on the R side. She demonstrated improvement with VC for \"big\" steps.      Treatment/Activity Tolerance:   [] Tolerated treatment with no adverse effects    [x] Patient limited by fatigue  [] Patient limited by pain   [] Patient limited by medical complications:    [] Adverse reaction to Tx:   [] Significant change in status    Safety:      Left up in:   []  bed       [x]  chair        Alarm:    [x] Alarm set      []  Pt refused alarms                [x]  Telesitter activated      [x]  Gait belt used during tx session      []Nurse notified      Number of Minutes/Billable Intervention  Therapeutic Exercise     Neuro Re-Ed    Gait 20   Therapeutic Activity 40   Wheelchair Propulsion    Group    Other:    TOTAL 60         Social History  Social/Functional History  Lives With: Daughter(dtr lives with pt in pt's house)  Type of Home: House  Home Layout: One level  Home Access: Stairs to enter with rails  Entrance Stairs - Number of Steps: 1 step with L. grab bar  Bathroom Shower/Tub: Walk-in shower, Shower chair without back(4 inch ledge to step over to access walk-in)  Bathroom Toilet: Handicap height(with BSC ontop to increase height)  Bathroom Accessibility: Not accessible(won't be able to use a walker in bathroom)  Home Equipment: Quad cane, Alert [employer] Help From: Family  ADL Assistance: Needs assistance(her dtr assists with bathing, otherwise pt manages ADL)  Bath: Independent  Dressing: Minimal assistance(Daughter lays her clothing out.)  Grooming: Independent  Feeding: Minimal assistance(lately has been requiring additional assistance d/t increase fatigue within the past couple of months)  Toileting: Independent(Mod I )  Homemaking Assistance: Needs assistance(Pt will assist with folding laundry and drying dishes)  Homemaking Responsibilities: No(Pt does

## 2019-08-29 PROCEDURE — 99232 SBSQ HOSP IP/OBS MODERATE 35: CPT | Performed by: PHYSICAL MEDICINE & REHABILITATION

## 2019-08-29 PROCEDURE — 97116 GAIT TRAINING THERAPY: CPT

## 2019-08-29 PROCEDURE — 94150 VITAL CAPACITY TEST: CPT

## 2019-08-29 PROCEDURE — 6370000000 HC RX 637 (ALT 250 FOR IP): Performed by: PHYSICAL MEDICINE & REHABILITATION

## 2019-08-29 PROCEDURE — 97530 THERAPEUTIC ACTIVITIES: CPT

## 2019-08-29 PROCEDURE — 2580000003 HC RX 258: Performed by: PHYSICAL MEDICINE & REHABILITATION

## 2019-08-29 PROCEDURE — 97110 THERAPEUTIC EXERCISES: CPT

## 2019-08-29 PROCEDURE — 6360000002 HC RX W HCPCS: Performed by: PHYSICAL MEDICINE & REHABILITATION

## 2019-08-29 PROCEDURE — 1280000000 HC REHAB R&B

## 2019-08-29 PROCEDURE — 92507 TX SP LANG VOICE COMM INDIV: CPT

## 2019-08-29 PROCEDURE — 94761 N-INVAS EAR/PLS OXIMETRY MLT: CPT

## 2019-08-29 PROCEDURE — 6370000000 HC RX 637 (ALT 250 FOR IP): Performed by: HOSPITALIST

## 2019-08-29 RX ADMIN — ACETAMINOPHEN 650 MG: 325 TABLET ORAL at 08:45

## 2019-08-29 RX ADMIN — HYDROCHLOROTHIAZIDE 25 MG: 25 TABLET ORAL at 08:44

## 2019-08-29 RX ADMIN — POTASSIUM CHLORIDE 20 MEQ: 20 TABLET, EXTENDED RELEASE ORAL at 08:44

## 2019-08-29 RX ADMIN — DOCUSATE SODIUM 100 MG: 100 CAPSULE, LIQUID FILLED ORAL at 21:06

## 2019-08-29 RX ADMIN — CLOPIDOGREL BISULFATE 75 MG: 75 TABLET ORAL at 08:44

## 2019-08-29 RX ADMIN — Medication 10 ML: at 21:07

## 2019-08-29 RX ADMIN — DOCUSATE SODIUM 100 MG: 100 CAPSULE, LIQUID FILLED ORAL at 08:44

## 2019-08-29 RX ADMIN — FAMOTIDINE 20 MG: 20 TABLET ORAL at 08:44

## 2019-08-29 RX ADMIN — POLYETHYLENE GLYCOL (3350) 17 G: 17 POWDER, FOR SOLUTION ORAL at 08:44

## 2019-08-29 RX ADMIN — LABETALOL HYDROCHLORIDE 100 MG: 100 TABLET, FILM COATED ORAL at 21:06

## 2019-08-29 RX ADMIN — Medication 10 ML: at 08:45

## 2019-08-29 RX ADMIN — ENOXAPARIN SODIUM 30 MG: 30 INJECTION SUBCUTANEOUS at 08:45

## 2019-08-29 RX ADMIN — PRAVASTATIN SODIUM 10 MG: 10 TABLET ORAL at 21:07

## 2019-08-29 RX ADMIN — ASPIRIN 81 MG 81 MG: 81 TABLET ORAL at 08:44

## 2019-08-29 RX ADMIN — LISINOPRIL 20 MG: 20 TABLET ORAL at 08:44

## 2019-08-29 RX ADMIN — MEMANTINE HYDROCHLORIDE 5 MG: 5 TABLET ORAL at 17:28

## 2019-08-29 RX ADMIN — LABETALOL HYDROCHLORIDE 100 MG: 100 TABLET, FILM COATED ORAL at 08:44

## 2019-08-29 RX ADMIN — CALCIUM POLYCARBOPHIL 625 MG TABLET 625 MG: at 08:45

## 2019-08-29 ASSESSMENT — PAIN DESCRIPTION - LOCATION: LOCATION: BACK

## 2019-08-29 ASSESSMENT — PAIN DESCRIPTION - PAIN TYPE: TYPE: CHRONIC PAIN

## 2019-08-29 ASSESSMENT — PAIN SCALES - GENERAL
PAINLEVEL_OUTOF10: 0
PAINLEVEL_OUTOF10: 2

## 2019-08-29 NOTE — PROGRESS NOTES
Nutrition Assessment    Type and Reason for Visit: Reassess    Nutrition Recommendations: Continue general diet    Continue frozen supplement at this time     Nutrition Assessment: Meal intake usually %, eating well at this time. Tolerating general diet, will eat magic cups also. Likes to eat very slowly. With improved intake now low nutrition risk. Malnutrition Assessment:  · Malnutrition Status:  At risk for malnutrition  · Context: Acute illness or injury    Nutrition Risk Level: Low    Nutrient Needs:  · Estimated Daily Total Kcal: 7537-7480 (25-30 kcal/kg current weight)   · Estimated Daily Protein (g): 63-75 (1-1.2 g/kg current weight)   · Estimated Daily Total Fluid (ml/day): 3292-8701 (1ml/radha)     Nutrition Diagnosis:   · Problem: Predicted suboptimal energy intake  · Etiology: related to Cognitive or neurological impairment     Signs and symptoms:  as evidenced by Other (Comment)(variance in meal intake )    Objective Information:  · Nutrition-Focused Physical Findings: siiting up in chair eating lunch, daughter present in room   · Wound Type: Skin Tears  · Current Nutrition Therapies:  · Oral Diet Orders: General   · Oral Diet intake: %  · Oral Nutrition Supplement (ONS) Orders: Frozen Oral Supplement  · ONS intake: %  · Anthropometric Measures:  · Ht: 5' 7\" (170.2 cm)   · Current Body Wt: 138 lb 3.7 oz (62.7 kg)  · Admission Body Wt: 145 lb 8.1 oz (66 kg)  · Usual Body Wt: 140 lb (63.5 kg)(past year per hx)  · % Weight Change:  ,  no loss reported   · Ideal Body Wt: 135 lb (61.2 kg), % Ideal Body 103  · BMI Classification: BMI 18.5 - 24.9 Normal Weight(BMI-22 )    Nutrition Interventions:   Continue current diet, Continue current ONS  Continued Inpatient Monitoring    Nutrition Evaluation:   · Evaluation: Progressing toward goals   · Goals: Patient will consume at least 50-75% at meals during stay     · Monitoring: Meal Intake, Weight, Pertinent Labs, Supplement Intake, Diet

## 2019-08-29 NOTE — PROGRESS NOTES
Strengthening, Functional Mobility Training, Home Exercise Program, Equipment Evaluation, Education, & procurement, Manual Therapy - Soft Tissue Mobilization, Manual Therapy - Joint Manipulation, Cognitive/Perceptual Training, Gait Training, Transfer Training, ROM, Balance Training, Endurance Training, Stair training, Safety Education & Training, Modalities, Positioning, Pain Management, Patient/Caregiver Education & Training, Neuromuscular Re-education    Electronically signed by   Lm Leary PT, DPT #[id number]  8/29/2019, 11:57 AM

## 2019-08-30 PROCEDURE — 99232 SBSQ HOSP IP/OBS MODERATE 35: CPT | Performed by: PHYSICAL MEDICINE & REHABILITATION

## 2019-08-30 PROCEDURE — 1280000000 HC REHAB R&B

## 2019-08-30 PROCEDURE — 97116 GAIT TRAINING THERAPY: CPT

## 2019-08-30 PROCEDURE — 6370000000 HC RX 637 (ALT 250 FOR IP): Performed by: HOSPITALIST

## 2019-08-30 PROCEDURE — 97110 THERAPEUTIC EXERCISES: CPT

## 2019-08-30 PROCEDURE — 6360000002 HC RX W HCPCS: Performed by: PHYSICAL MEDICINE & REHABILITATION

## 2019-08-30 PROCEDURE — 6370000000 HC RX 637 (ALT 250 FOR IP): Performed by: PHYSICAL MEDICINE & REHABILITATION

## 2019-08-30 PROCEDURE — 92507 TX SP LANG VOICE COMM INDIV: CPT

## 2019-08-30 PROCEDURE — 2580000003 HC RX 258: Performed by: PHYSICAL MEDICINE & REHABILITATION

## 2019-08-30 PROCEDURE — 97535 SELF CARE MNGMENT TRAINING: CPT

## 2019-08-30 RX ORDER — SODIUM CHLORIDE 9 MG/ML
INJECTION, SOLUTION INTRAVENOUS CONTINUOUS
Status: CANCELLED | OUTPATIENT
Start: 2019-08-30

## 2019-08-30 RX ORDER — SODIUM CHLORIDE 9 MG/ML
INJECTION, SOLUTION INTRAVENOUS ONCE
Status: COMPLETED | OUTPATIENT
Start: 2019-08-30 | End: 2019-08-30

## 2019-08-30 RX ADMIN — ASPIRIN 81 MG 81 MG: 81 TABLET ORAL at 09:55

## 2019-08-30 RX ADMIN — CLOPIDOGREL BISULFATE 75 MG: 75 TABLET ORAL at 09:53

## 2019-08-30 RX ADMIN — MEMANTINE HYDROCHLORIDE 5 MG: 5 TABLET ORAL at 17:38

## 2019-08-30 RX ADMIN — ACETAMINOPHEN 650 MG: 325 TABLET ORAL at 09:53

## 2019-08-30 RX ADMIN — CALCIUM POLYCARBOPHIL 625 MG TABLET 625 MG: at 09:54

## 2019-08-30 RX ADMIN — DOCUSATE SODIUM 100 MG: 100 CAPSULE, LIQUID FILLED ORAL at 20:47

## 2019-08-30 RX ADMIN — Medication 10 ML: at 10:15

## 2019-08-30 RX ADMIN — POLYETHYLENE GLYCOL (3350) 17 G: 17 POWDER, FOR SOLUTION ORAL at 09:53

## 2019-08-30 RX ADMIN — Medication 10 ML: at 20:48

## 2019-08-30 RX ADMIN — FAMOTIDINE 20 MG: 20 TABLET ORAL at 09:53

## 2019-08-30 RX ADMIN — POTASSIUM CHLORIDE 20 MEQ: 20 TABLET, EXTENDED RELEASE ORAL at 10:15

## 2019-08-30 RX ADMIN — DOCUSATE SODIUM 100 MG: 100 CAPSULE, LIQUID FILLED ORAL at 09:55

## 2019-08-30 RX ADMIN — ACETAMINOPHEN 650 MG: 325 TABLET ORAL at 01:24

## 2019-08-30 RX ADMIN — PRAVASTATIN SODIUM 10 MG: 10 TABLET ORAL at 20:48

## 2019-08-30 RX ADMIN — SODIUM CHLORIDE: 9 INJECTION, SOLUTION INTRAVENOUS at 10:22

## 2019-08-30 RX ADMIN — ENOXAPARIN SODIUM 30 MG: 30 INJECTION SUBCUTANEOUS at 09:54

## 2019-08-30 RX ADMIN — LABETALOL HYDROCHLORIDE 100 MG: 100 TABLET, FILM COATED ORAL at 20:47

## 2019-08-30 ASSESSMENT — PAIN SCALES - GENERAL: PAINLEVEL_OUTOF10: 4

## 2019-08-30 NOTE — PROGRESS NOTES
Guilford Opitz    : 1921  Acct #: [de-identified]  MRN: [id number]              PM&R Progress Note      Admitting diagnosis: Ischemic stroke with infarction    Comorbid diagnoses impacting rehabilitation: Right hemiparesis, dysarthria, dysphagia, dementia with cognitive impairment, acute kidney injury, hypertension, obstipation.     Chief complaint: Tired after morning therapies. Prior (baseline) level of function: Independent. Current level of function:     Physical therapy:  Bed Mobility: Scooting: Contact guard assistance  Transfers: Sit to Stand:  Moderate Assistance, Maximum Assistance  Stand to sit: Minimal Assistance  Bed to Chair: Moderate assistance(Increased steadying required due to fatigue), Ambulation 1  Surface: level tile  Device: Rolling Walker  Assistance: Minimal assistance  Quality of Gait: forefoot at initial contact with LLE, FWD bent posture, increased unsteadiness with fatigue and resp rate; reduced step length with shuffling gait pattern   Distance: 10 ft (Min A for steadying) + 40 ft (max effort, with Min A) , Stairs  # Steps : 5  Stairs Height: (4/6)  Rails: Bilateral(Min A ascending, Mod A descending with pt using TC input in order to clear step)  Curbs: 4\"(FWW, Min A)  Mobility: Bed, Chair, Wheel Chair: 4 - Requires steadying assistance only <25% assist  and/or requires assist with one leg only  Walk: 4 - Contact Guard/Minimal Assistance Requires up to Contact Guard or Minimal Assistance to walk at least 150 feet  Distance Walked: 209  Stairs: 2- Maximal Assistance Performs 25-49% of the effort to go up and down 4 to 6 stairs and requires the assistance of one person only, PT Equipment Recommendations  Other: FWW,      Occupational therapy: Eatin - Feeds self with setup/supervision/cues and/or requires only setup/supervision/cues to perform tube feedings  Groomin - Able to perform 3-4 tasks with touching help  Bathin - Able to bathe 3-4 areas  Dressing-Upper: 3 - Expected length of stay  prior to a supervised level of function for discharge home with a walker and James Corona OT/PT is 9/10/2019. Recommendations:    1. Ischemic stroke with infarction: Inconsistent tolerance for her daily occupational and physical therapy with speech-language pathology. Sheryl Davenport modified diet and attention to swallowing in speech therapy.  Seems to tolerate the antiplatelet therapy with baby aspirin and Plavix.  GI prophylaxis is offered.  Must attend to her elevated blood pressure, obstipation and nutritional support.    Pulmonary hygiene and DVT prophylaxis emphasized.  Some right inattention and morning lethargy interfering with therapies. Cedric Slain elevated blood pressure makes using a stimulant more complicated.  Reduced sedatives at night. 2. DVT prophylaxis: Lovenox 30 mg subcu daily.  I must monitor her hemoglobin and platelet count while on this medication.  Weightbearing activities pursued daily. Nellie Samuel prophylaxis tolerated.  Struggling to walk yet.  No new bruising. 3. Uncontrolled hypertension: Patient requires labetalol, hydrochlorothiazide and lisinopril.  Target systolic blood pressure is 120-140.  Consistent oral intake encouraged.  Vital signs are checked at before meals and at bedtime.  Systolic blood pressure is frequently above target yet.  Wanting to avoid drops in blood pressure or dizziness.  Monitoring her elevated blood pressure. 4. Acute kidney injury: We must control blood pressure fluctuations, provide oral hydration and monitor renal function closely.  Avoiding nephrotoxic medications.  Adjusted Lovenox for renal dosing.  Verbal cues to maximize oral intake.   5. Dementia: Seems to have worsened significantly recently.  Neurology has started Namenda. Monitoring her response to this medication.  Treating her in a calm and consistent environment to help retention.    Trying to establish regular sleep-wake cycles.  Caregiver training will be particularly important

## 2019-08-30 NOTE — PROGRESS NOTES
for transfer:   RW      Functional Mobility:    Assistance:  CGA within room  Device:   [x]   Rolling Walker     []   Standard Onita Bonds []   Wheelchair        []   [employer]       []   Manjeet Darrius         []   Cardiac Onita Bonds       []   Other:      Additional Therapeutic activities/exercises completed this date:     [x]   ADL Training   [x]   Balance/Postural training     [x]   Bed/Transfer Training   [x]   Endurance Training   []   Neuromuscular Re-ed   []   Nu-step:  Time:        Level:         #Steps:       []   Rebounder:    []  Seated     []  Standing        []   Supine Ther Ex (reps/sets):     []   Seated Ther Ex (reps/sets):     []   Standing Ther Ex (reps/sets):     []   Other:      Comments: All intervention performed to increase pt's endurance, ax tolerance, balance, and I c ADLs/IADLs and functional transfers/mobility. Patient/Caregiver Education and Training:   []   [employer] Equipment Use  [x]   Bed Mobility/Transfer Technique/Safety  []   Energy Conservation Tips  []   Family training  [x]   Postural Awareness  [x]   Safety During Functional Activities  []   Reinforced Patient's Precautions   []   Progress was updated and reviewed in Rehabtracker with patient and/or family this         date. Treatment Plan for Next Session: Continue OT POC, trial using reacher PRN during LB ADLs     Assessment:  While pt still requires moderate cues, pt is demo'ing improved initiation compared to beginning of this week, and was not cue dependent.       Treatment/Activity Tolerance:   [x] Tolerated treatment with no adverse effects    [] Patient limited by fatigue  [] Patient limited by pain   [] Patient limited by medical complications:    [] Adverse reaction to Tx:   [] Significant change in status    Safety:       []  bed alarm set    [x]  chair alarm set    []  Pt refused alarms                [x]  Telesitter activated      [x]  Gait belt used during tx session      []other:       Number of Minutes/Billable

## 2019-08-31 PROCEDURE — 6370000000 HC RX 637 (ALT 250 FOR IP): Performed by: HOSPITALIST

## 2019-08-31 PROCEDURE — 6360000002 HC RX W HCPCS: Performed by: PHYSICAL MEDICINE & REHABILITATION

## 2019-08-31 PROCEDURE — 6370000000 HC RX 637 (ALT 250 FOR IP): Performed by: PHYSICAL MEDICINE & REHABILITATION

## 2019-08-31 PROCEDURE — 97530 THERAPEUTIC ACTIVITIES: CPT

## 2019-08-31 PROCEDURE — 97110 THERAPEUTIC EXERCISES: CPT

## 2019-08-31 PROCEDURE — 2580000003 HC RX 258: Performed by: PHYSICAL MEDICINE & REHABILITATION

## 2019-08-31 PROCEDURE — 97127 HC SP THER IVNTJ W/FOCUS COG FUNCJ: CPT

## 2019-08-31 PROCEDURE — 92507 TX SP LANG VOICE COMM INDIV: CPT

## 2019-08-31 PROCEDURE — 1280000000 HC REHAB R&B

## 2019-08-31 PROCEDURE — 97535 SELF CARE MNGMENT TRAINING: CPT

## 2019-08-31 PROCEDURE — 97116 GAIT TRAINING THERAPY: CPT

## 2019-08-31 RX ADMIN — ACETAMINOPHEN 650 MG: 325 TABLET ORAL at 21:06

## 2019-08-31 RX ADMIN — ENOXAPARIN SODIUM 30 MG: 30 INJECTION SUBCUTANEOUS at 08:39

## 2019-08-31 RX ADMIN — MEMANTINE HYDROCHLORIDE 5 MG: 5 TABLET ORAL at 18:02

## 2019-08-31 RX ADMIN — CALCIUM POLYCARBOPHIL 625 MG TABLET 625 MG: at 08:39

## 2019-08-31 RX ADMIN — LISINOPRIL 20 MG: 20 TABLET ORAL at 08:38

## 2019-08-31 RX ADMIN — POTASSIUM CHLORIDE 20 MEQ: 20 TABLET, EXTENDED RELEASE ORAL at 08:44

## 2019-08-31 RX ADMIN — ASPIRIN 81 MG 81 MG: 81 TABLET ORAL at 08:38

## 2019-08-31 RX ADMIN — LABETALOL HYDROCHLORIDE 100 MG: 100 TABLET, FILM COATED ORAL at 08:37

## 2019-08-31 RX ADMIN — CLOPIDOGREL BISULFATE 75 MG: 75 TABLET ORAL at 08:39

## 2019-08-31 RX ADMIN — Medication 10 ML: at 08:44

## 2019-08-31 RX ADMIN — Medication 10 ML: at 21:08

## 2019-08-31 RX ADMIN — POLYETHYLENE GLYCOL (3350) 17 G: 17 POWDER, FOR SOLUTION ORAL at 08:44

## 2019-08-31 RX ADMIN — DOCUSATE SODIUM 100 MG: 100 CAPSULE, LIQUID FILLED ORAL at 08:38

## 2019-08-31 RX ADMIN — PRAVASTATIN SODIUM 10 MG: 10 TABLET ORAL at 21:06

## 2019-08-31 RX ADMIN — HYDROCHLOROTHIAZIDE 25 MG: 25 TABLET ORAL at 08:38

## 2019-08-31 RX ADMIN — DOCUSATE SODIUM 100 MG: 100 CAPSULE, LIQUID FILLED ORAL at 21:07

## 2019-08-31 RX ADMIN — LABETALOL HYDROCHLORIDE 100 MG: 100 TABLET, FILM COATED ORAL at 21:06

## 2019-08-31 RX ADMIN — FAMOTIDINE 20 MG: 20 TABLET ORAL at 08:38

## 2019-08-31 ASSESSMENT — PAIN SCALES - GENERAL: PAINLEVEL_OUTOF10: 3

## 2019-08-31 NOTE — PROGRESS NOTES
shoes, Max A to wendi shoes     Toileting: Mod A      Toilet Transfers:   Min A  Device Used:    []   Standard Toilet         [x]   Grab Bars           []  Bedside Commode       []   Elevated Toilet          [x]   Other: BSC frame over toilet       Bed Mobility:           [x]   Pt received out of bed   Rolling R/L:    Scooting:    Supine --> Sit:    Sit --> Supine:      Transfers:    Sit--> Stand:  Min A  Stand --> Sit:   Min A  Stand-Pivot:   Min A  Other:    Assistive device required for transfer:   RW      Functional Mobility:    Assistance:   Within room to/from  and ~50 ft in hallway with [facility]  Device:   []   Rolling Walker     []   Standard Walker []   Wheelchair        []   [employer]       []   4-Wheeled Darion Rucks         []   Cardiac Darion Rucks       []   Other:        Homemaking Tasks: : pt completed safety awareness ax and was able to ID 5/5 unsafe household situations with moderate prompting        Additional Therapeutic activities/exercises completed this date:     [x]   ADL Training   []   Balance/Postural training     []   Bed/Transfer Training   []   Endurance Training   []   Neuromuscular Re-ed   []   Nu-step:  Time:        Level:         #Steps:       []   Rebounder:    []  Seated     []  Standing        []   Supine Ther Ex (reps/sets):     [x]   Seated Ther Ex (reps/sets):  Pt completed fine motor manipulation ax of string beads, pt was able to string all sizes w/o difficulty   [x]   Standing Ther Ex (reps/sets):  Pt completed therapeutic clothespin tree ax with emphasis on dynamic standing balance ~5 min, pt resistant to reaching outside BOY to take clothespins from therapist and required max encouragement to reach minimally   []   Other:      Comments:      Patient/Caregiver Education and Training:   []   [employer] Equipment Use  [x]   Bed Mobility/Transfer Technique/Safety  []   Energy Conservation Tips  []   Family training  []   Postural Awareness  [x]   Safety During Functional Activities  []

## 2019-08-31 NOTE — PROGRESS NOTES
[facility] UNIT  SPEECH/LANGUAGE PATHOLOGY      [x] Daily           [] Discharge    Patient:Analy Swanson      :1921  [facility]:[id number]  Rehab Dx/Hx: Acute cerebrovascular accident (CVA) ([facility] [address] [I63.9]   No Known Allergies  Precautions:  Restrictions/Precautions: Fall Risk, Weight Bearing(Hx of pacemaker)          Home Situation/IADL:   Social/Functional History  Lives With: Daughter(dtr lives with pt in pt's house)  Type of Home: House  Home Layout: One level  Home Access: Stairs to enter with rails  Entrance Stairs - Number of Steps: 1 step with L. grab bar  Bathroom Shower/Tub: Walk-in shower, Shower chair without back(4 inch ledge to step over to access walk-in)  Bathroom Toilet: Handicap height(with BSC ontop to increase height)  Bathroom Accessibility: Not accessible(won't be able to use a walker in bathroom)  Home Equipment: Quad cane, Alert [employer] Help From: Family  ADL Assistance: Needs assistance(her dtr assists with bathing, otherwise pt manages ADL)  Bath: Independent  Dressing: Minimal assistance(Daughter lays her clothing out.)  Grooming: Independent  Feeding: Minimal assistance(lately has been requiring additional assistance d/t increase fatigue within the past couple of months)  Toileting: Independent(Mod I )  Homemaking Assistance: Needs assistance(Pt will assist with folding laundry and drying dishes)  Homemaking Responsibilities: No(Pt does not need to complete, but she likes to help her daughter.)  Ambulation Assistance: Independent(CATALINO c cane within house)  Transfer Assistance: Independent  Active : No  Mode of Transportation: Family  Occupation: Retired  Type of occupation: Worked at [employer]: Watching game shows; [address], Likes to read, dtr prepares meds in pill box and lays them out for pt.  Family assists with finances  Additional Comments: Pt's daughter, Reina Turner, manages transportation, cooking, medication,

## 2019-09-01 ENCOUNTER — APPOINTMENT (OUTPATIENT)
Dept: GENERAL RADIOLOGY | Age: 84
DRG: 057 | End: 2019-09-01
Attending: PHYSICAL MEDICINE & REHABILITATION
Payer: MEDICARE

## 2019-09-01 LAB
ALBUMIN SERPL-MCNC: 3.8 GM/DL (ref 3.4–5)
ALP BLD-CCNC: 95 IU/L (ref 40–129)
ALT SERPL-CCNC: 18 U/L (ref 10–40)
ANION GAP SERPL CALCULATED.3IONS-SCNC: 10 MMOL/L (ref 4–16)
AST SERPL-CCNC: 21 IU/L (ref 15–37)
BASOPHILS ABSOLUTE: 0.1 K/CU MM
BASOPHILS RELATIVE PERCENT: 0.6 % (ref 0–1)
BILIRUB SERPL-MCNC: 0.4 MG/DL (ref 0–1)
BUN BLDV-MCNC: 27 MG/DL (ref 6–23)
CALCIUM SERPL-MCNC: 9.3 MG/DL (ref 8.3–10.6)
CHLORIDE BLD-SCNC: 94 MMOL/L (ref 99–110)
CO2: 26 MMOL/L (ref 21–32)
CREAT SERPL-MCNC: 1 MG/DL (ref 0.6–1.1)
DIFFERENTIAL TYPE: ABNORMAL
EKG ATRIAL RATE: 62 BPM
EKG ATRIAL RATE: 74 BPM
EKG DIAGNOSIS: NORMAL
EKG DIAGNOSIS: NORMAL
EKG P AXIS: 75 DEGREES
EKG P AXIS: 77 DEGREES
EKG P-R INTERVAL: 166 MS
EKG P-R INTERVAL: 174 MS
EKG Q-T INTERVAL: 388 MS
EKG Q-T INTERVAL: 420 MS
EKG QRS DURATION: 88 MS
EKG QRS DURATION: 92 MS
EKG QTC CALCULATION (BAZETT): 426 MS
EKG QTC CALCULATION (BAZETT): 430 MS
EKG R AXIS: 11 DEGREES
EKG R AXIS: 4 DEGREES
EKG T AXIS: 31 DEGREES
EKG T AXIS: 39 DEGREES
EKG VENTRICULAR RATE: 62 BPM
EKG VENTRICULAR RATE: 74 BPM
EOSINOPHILS ABSOLUTE: 0.3 K/CU MM
EOSINOPHILS RELATIVE PERCENT: 4.3 % (ref 0–3)
GFR AFRICAN AMERICAN: >60 ML/MIN/1.73M2
GFR NON-AFRICAN AMERICAN: 51 ML/MIN/1.73M2
GLUCOSE BLD-MCNC: 100 MG/DL (ref 70–99)
HCT VFR BLD CALC: 36.8 % (ref 37–47)
HEMOGLOBIN: 12.2 GM/DL (ref 12.5–16)
IMMATURE NEUTROPHIL %: 0.6 % (ref 0–0.43)
LIPASE: 109 IU/L (ref 13–60)
LYMPHOCYTES ABSOLUTE: 1 K/CU MM
LYMPHOCYTES RELATIVE PERCENT: 13.1 % (ref 24–44)
MCH RBC QN AUTO: 29.9 PG (ref 27–31)
MCHC RBC AUTO-ENTMCNC: 33.2 % (ref 32–36)
MCV RBC AUTO: 90.2 FL (ref 78–100)
MONOCYTES ABSOLUTE: 0.9 K/CU MM
MONOCYTES RELATIVE PERCENT: 11.2 % (ref 0–4)
NUCLEATED RBC %: 0 %
PDW BLD-RTO: 13.1 % (ref 11.7–14.9)
PLATELET # BLD: 315 K/CU MM (ref 140–440)
PMV BLD AUTO: 9.8 FL (ref 7.5–11.1)
POTASSIUM SERPL-SCNC: 4 MMOL/L (ref 3.5–5.1)
RBC # BLD: 4.08 M/CU MM (ref 4.2–5.4)
SEGMENTED NEUTROPHILS ABSOLUTE COUNT: 5.5 K/CU MM
SEGMENTED NEUTROPHILS RELATIVE PERCENT: 70.2 % (ref 36–66)
SODIUM BLD-SCNC: 130 MMOL/L (ref 135–145)
TOTAL IMMATURE NEUTOROPHIL: 0.05 K/CU MM
TOTAL NUCLEATED RBC: 0 K/CU MM
TOTAL PROTEIN: 5.6 GM/DL (ref 6.4–8.2)
TROPONIN T: <0.01 NG/ML
TROPONIN T: <0.01 NG/ML
WBC # BLD: 7.9 K/CU MM (ref 4–10.5)

## 2019-09-01 PROCEDURE — 6360000002 HC RX W HCPCS: Performed by: PHYSICAL MEDICINE & REHABILITATION

## 2019-09-01 PROCEDURE — 84484 ASSAY OF TROPONIN QUANT: CPT

## 2019-09-01 PROCEDURE — 6370000000 HC RX 637 (ALT 250 FOR IP): Performed by: PHYSICAL MEDICINE & REHABILITATION

## 2019-09-01 PROCEDURE — 93010 ELECTROCARDIOGRAM REPORT: CPT | Performed by: INTERNAL MEDICINE

## 2019-09-01 PROCEDURE — 80053 COMPREHEN METABOLIC PANEL: CPT

## 2019-09-01 PROCEDURE — 2580000003 HC RX 258: Performed by: PHYSICAL MEDICINE & REHABILITATION

## 2019-09-01 PROCEDURE — 6370000000 HC RX 637 (ALT 250 FOR IP): Performed by: HOSPITALIST

## 2019-09-01 PROCEDURE — 85025 COMPLETE CBC W/AUTO DIFF WBC: CPT

## 2019-09-01 PROCEDURE — 93005 ELECTROCARDIOGRAM TRACING: CPT | Performed by: INTERNAL MEDICINE

## 2019-09-01 PROCEDURE — 36415 COLL VENOUS BLD VENIPUNCTURE: CPT

## 2019-09-01 PROCEDURE — 1280000000 HC REHAB R&B

## 2019-09-01 PROCEDURE — 83690 ASSAY OF LIPASE: CPT

## 2019-09-01 RX ADMIN — CLOPIDOGREL BISULFATE 75 MG: 75 TABLET ORAL at 09:03

## 2019-09-01 RX ADMIN — CALCIUM CARBONATE 500 MG: 500 TABLET, CHEWABLE ORAL at 17:35

## 2019-09-01 RX ADMIN — CALCIUM POLYCARBOPHIL 625 MG TABLET 625 MG: at 09:03

## 2019-09-01 RX ADMIN — LABETALOL HYDROCHLORIDE 100 MG: 100 TABLET, FILM COATED ORAL at 09:03

## 2019-09-01 RX ADMIN — POTASSIUM CHLORIDE 20 MEQ: 20 TABLET, EXTENDED RELEASE ORAL at 09:03

## 2019-09-01 RX ADMIN — LISINOPRIL 20 MG: 20 TABLET ORAL at 09:03

## 2019-09-01 RX ADMIN — ACETAMINOPHEN 650 MG: 325 TABLET ORAL at 20:42

## 2019-09-01 RX ADMIN — PRAVASTATIN SODIUM 10 MG: 10 TABLET ORAL at 20:42

## 2019-09-01 RX ADMIN — MEMANTINE HYDROCHLORIDE 5 MG: 5 TABLET ORAL at 17:00

## 2019-09-01 RX ADMIN — ENOXAPARIN SODIUM 30 MG: 30 INJECTION SUBCUTANEOUS at 09:03

## 2019-09-01 RX ADMIN — FAMOTIDINE 20 MG: 20 TABLET ORAL at 09:03

## 2019-09-01 RX ADMIN — DOCUSATE SODIUM 100 MG: 100 CAPSULE, LIQUID FILLED ORAL at 20:42

## 2019-09-01 RX ADMIN — ACETAMINOPHEN 650 MG: 325 TABLET ORAL at 05:37

## 2019-09-01 RX ADMIN — DOCUSATE SODIUM 100 MG: 100 CAPSULE, LIQUID FILLED ORAL at 09:03

## 2019-09-01 RX ADMIN — Medication 10 ML: at 20:43

## 2019-09-01 RX ADMIN — LABETALOL HYDROCHLORIDE 100 MG: 100 TABLET, FILM COATED ORAL at 20:42

## 2019-09-01 RX ADMIN — POLYETHYLENE GLYCOL (3350) 17 G: 17 POWDER, FOR SOLUTION ORAL at 09:02

## 2019-09-01 RX ADMIN — ASPIRIN 81 MG 81 MG: 81 TABLET ORAL at 09:03

## 2019-09-01 RX ADMIN — HYDROCHLOROTHIAZIDE 25 MG: 25 TABLET ORAL at 09:03

## 2019-09-01 ASSESSMENT — PAIN SCALES - GENERAL
PAINLEVEL_OUTOF10: 2
PAINLEVEL_OUTOF10: 3

## 2019-09-01 NOTE — PROGRESS NOTES
Update note:    Responded to rapid response call earlier in the morning. Patient complained of chest pain. However upon arrival, the chest pain resolved immediately. Initial EKG did not show any evidence of ischemic changes. Troponin was normal. Repeat EKG and troponin were also normal again. routine labs were also unremarkable. Patient did not have repeat occurrence of chest pain. Case was discussed with with the rehab attending.

## 2019-09-02 PROCEDURE — 2580000003 HC RX 258: Performed by: PHYSICAL MEDICINE & REHABILITATION

## 2019-09-02 PROCEDURE — 6370000000 HC RX 637 (ALT 250 FOR IP): Performed by: PHYSICAL MEDICINE & REHABILITATION

## 2019-09-02 PROCEDURE — 6360000002 HC RX W HCPCS: Performed by: PHYSICAL MEDICINE & REHABILITATION

## 2019-09-02 PROCEDURE — 1280000000 HC REHAB R&B

## 2019-09-02 PROCEDURE — 99232 SBSQ HOSP IP/OBS MODERATE 35: CPT | Performed by: PHYSICAL MEDICINE & REHABILITATION

## 2019-09-02 PROCEDURE — 94761 N-INVAS EAR/PLS OXIMETRY MLT: CPT

## 2019-09-02 PROCEDURE — 6370000000 HC RX 637 (ALT 250 FOR IP): Performed by: HOSPITALIST

## 2019-09-02 RX ADMIN — CALCIUM POLYCARBOPHIL 625 MG TABLET 625 MG: at 08:41

## 2019-09-02 RX ADMIN — MEMANTINE HYDROCHLORIDE 5 MG: 5 TABLET ORAL at 17:54

## 2019-09-02 RX ADMIN — ASPIRIN 81 MG 81 MG: 81 TABLET ORAL at 08:41

## 2019-09-02 RX ADMIN — DOCUSATE SODIUM 100 MG: 100 CAPSULE, LIQUID FILLED ORAL at 08:41

## 2019-09-02 RX ADMIN — Medication 10 ML: at 08:42

## 2019-09-02 RX ADMIN — LABETALOL HYDROCHLORIDE 100 MG: 100 TABLET, FILM COATED ORAL at 08:41

## 2019-09-02 RX ADMIN — ACETAMINOPHEN 650 MG: 325 TABLET ORAL at 08:41

## 2019-09-02 RX ADMIN — FAMOTIDINE 20 MG: 20 TABLET ORAL at 08:41

## 2019-09-02 RX ADMIN — CLOPIDOGREL BISULFATE 75 MG: 75 TABLET ORAL at 08:41

## 2019-09-02 RX ADMIN — POLYETHYLENE GLYCOL (3350) 17 G: 17 POWDER, FOR SOLUTION ORAL at 08:41

## 2019-09-02 RX ADMIN — DOCUSATE SODIUM 100 MG: 100 CAPSULE, LIQUID FILLED ORAL at 20:50

## 2019-09-02 RX ADMIN — LABETALOL HYDROCHLORIDE 100 MG: 100 TABLET, FILM COATED ORAL at 20:51

## 2019-09-02 RX ADMIN — ENOXAPARIN SODIUM 30 MG: 30 INJECTION SUBCUTANEOUS at 08:41

## 2019-09-02 RX ADMIN — HYDROCHLOROTHIAZIDE 25 MG: 25 TABLET ORAL at 08:41

## 2019-09-02 RX ADMIN — LISINOPRIL 20 MG: 20 TABLET ORAL at 08:41

## 2019-09-02 RX ADMIN — PRAVASTATIN SODIUM 10 MG: 10 TABLET ORAL at 20:51

## 2019-09-02 RX ADMIN — POTASSIUM CHLORIDE 20 MEQ: 20 TABLET, EXTENDED RELEASE ORAL at 08:41

## 2019-09-02 ASSESSMENT — PAIN SCALES - GENERAL
PAINLEVEL_OUTOF10: 0
PAINLEVEL_OUTOF10: 5

## 2019-09-03 LAB
BACTERIA: ABNORMAL /HPF
BILIRUBIN URINE: NEGATIVE MG/DL
BLOOD, URINE: NEGATIVE
CLARITY: ABNORMAL
COLOR: YELLOW
GLUCOSE, URINE: NEGATIVE MG/DL
HYALINE CASTS: 2 /LPF
KETONES, URINE: NEGATIVE MG/DL
LEUKOCYTE ESTERASE, URINE: ABNORMAL
MUCUS: ABNORMAL HPF
NITRITE URINE, QUANTITATIVE: POSITIVE
PH, URINE: 6 (ref 5–8)
PROTEIN UA: 30 MG/DL
RBC URINE: 2 /HPF (ref 0–6)
SPECIFIC GRAVITY UA: 1.02 (ref 1–1.03)
SQUAMOUS EPITHELIAL: 1 /HPF
TRICHOMONAS: ABNORMAL /HPF
UROBILINOGEN, URINE: NORMAL MG/DL (ref 0.2–1)
WBC UA: 66 /HPF (ref 0–5)

## 2019-09-03 PROCEDURE — 97530 THERAPEUTIC ACTIVITIES: CPT

## 2019-09-03 PROCEDURE — 97110 THERAPEUTIC EXERCISES: CPT

## 2019-09-03 PROCEDURE — 97116 GAIT TRAINING THERAPY: CPT

## 2019-09-03 PROCEDURE — 6370000000 HC RX 637 (ALT 250 FOR IP): Performed by: PHYSICAL MEDICINE & REHABILITATION

## 2019-09-03 PROCEDURE — 6360000002 HC RX W HCPCS: Performed by: PHYSICAL MEDICINE & REHABILITATION

## 2019-09-03 PROCEDURE — 87186 SC STD MICRODIL/AGAR DIL: CPT

## 2019-09-03 PROCEDURE — 94761 N-INVAS EAR/PLS OXIMETRY MLT: CPT

## 2019-09-03 PROCEDURE — 2580000003 HC RX 258: Performed by: PHYSICAL MEDICINE & REHABILITATION

## 2019-09-03 PROCEDURE — 81001 URINALYSIS AUTO W/SCOPE: CPT

## 2019-09-03 PROCEDURE — 6370000000 HC RX 637 (ALT 250 FOR IP): Performed by: HOSPITALIST

## 2019-09-03 PROCEDURE — 87077 CULTURE AEROBIC IDENTIFY: CPT

## 2019-09-03 PROCEDURE — 1280000000 HC REHAB R&B

## 2019-09-03 PROCEDURE — 92507 TX SP LANG VOICE COMM INDIV: CPT

## 2019-09-03 PROCEDURE — 94150 VITAL CAPACITY TEST: CPT

## 2019-09-03 PROCEDURE — 87086 URINE CULTURE/COLONY COUNT: CPT

## 2019-09-03 PROCEDURE — 99233 SBSQ HOSP IP/OBS HIGH 50: CPT | Performed by: PHYSICAL MEDICINE & REHABILITATION

## 2019-09-03 PROCEDURE — 97112 NEUROMUSCULAR REEDUCATION: CPT

## 2019-09-03 PROCEDURE — 51798 US URINE CAPACITY MEASURE: CPT

## 2019-09-03 PROCEDURE — 97535 SELF CARE MNGMENT TRAINING: CPT

## 2019-09-03 RX ORDER — CIPROFLOXACIN 250 MG/1
250 TABLET, FILM COATED ORAL EVERY 12 HOURS SCHEDULED
Status: DISCONTINUED | OUTPATIENT
Start: 2019-09-03 | End: 2019-09-06

## 2019-09-03 RX ADMIN — HYDROCHLOROTHIAZIDE 25 MG: 25 TABLET ORAL at 08:37

## 2019-09-03 RX ADMIN — DOCUSATE SODIUM 100 MG: 100 CAPSULE, LIQUID FILLED ORAL at 08:37

## 2019-09-03 RX ADMIN — LABETALOL HYDROCHLORIDE 100 MG: 100 TABLET, FILM COATED ORAL at 20:50

## 2019-09-03 RX ADMIN — CALCIUM CARBONATE 500 MG: 500 TABLET, CHEWABLE ORAL at 17:34

## 2019-09-03 RX ADMIN — FAMOTIDINE 20 MG: 20 TABLET ORAL at 08:38

## 2019-09-03 RX ADMIN — PRAVASTATIN SODIUM 10 MG: 10 TABLET ORAL at 20:49

## 2019-09-03 RX ADMIN — POLYETHYLENE GLYCOL (3350) 17 G: 17 POWDER, FOR SOLUTION ORAL at 08:37

## 2019-09-03 RX ADMIN — LABETALOL HYDROCHLORIDE 100 MG: 100 TABLET, FILM COATED ORAL at 08:37

## 2019-09-03 RX ADMIN — MEMANTINE HYDROCHLORIDE 5 MG: 5 TABLET ORAL at 17:34

## 2019-09-03 RX ADMIN — ACETAMINOPHEN 650 MG: 325 TABLET ORAL at 07:57

## 2019-09-03 RX ADMIN — ENOXAPARIN SODIUM 30 MG: 30 INJECTION SUBCUTANEOUS at 08:38

## 2019-09-03 RX ADMIN — CIPROFLOXACIN HYDROCHLORIDE 250 MG: 250 TABLET, FILM COATED ORAL at 20:50

## 2019-09-03 RX ADMIN — LISINOPRIL 20 MG: 20 TABLET ORAL at 08:37

## 2019-09-03 RX ADMIN — ASPIRIN 81 MG 81 MG: 81 TABLET ORAL at 08:37

## 2019-09-03 RX ADMIN — POTASSIUM CHLORIDE 20 MEQ: 20 TABLET, EXTENDED RELEASE ORAL at 08:38

## 2019-09-03 RX ADMIN — CLOPIDOGREL BISULFATE 75 MG: 75 TABLET ORAL at 08:37

## 2019-09-03 RX ADMIN — CALCIUM POLYCARBOPHIL 625 MG TABLET 625 MG: at 08:37

## 2019-09-03 RX ADMIN — DOCUSATE SODIUM 100 MG: 100 CAPSULE, LIQUID FILLED ORAL at 20:50

## 2019-09-03 RX ADMIN — ACETAMINOPHEN 650 MG: 325 TABLET ORAL at 13:17

## 2019-09-03 ASSESSMENT — PAIN SCALES - GENERAL
PAINLEVEL_OUTOF10: 3
PAINLEVEL_OUTOF10: 0
PAINLEVEL_OUTOF10: 2
PAINLEVEL_OUTOF10: 0

## 2019-09-03 ASSESSMENT — PAIN DESCRIPTION - LOCATION
LOCATION: BACK
LOCATION: BACK

## 2019-09-03 ASSESSMENT — PAIN DESCRIPTION - PAIN TYPE
TYPE: CHRONIC PAIN
TYPE: CHRONIC PAIN

## 2019-09-03 NOTE — PROGRESS NOTES
this stay.   6. Obstipation: Continue with oral medications including MiraLAX.  She is on a stool softener.  Encouraging regular oral intake, physical activity and hydration.

## 2019-09-03 NOTE — PATIENT CARE CONFERENCE
ACUTE REHAB TEAM CONFERENCE SUMMARY   [address] [facility]    NAME: Jax Benoit  : 1921 ADMIT DATE: 2019    Rehab Admitting Dx:Acute cerebrovascular accident (CVA) [facility]) [I63.9]  Patient Comorbid Conditions: Active Hospital Problems    Diagnosis Date Noted    Spastic hemiparesis of right dominant side due to acute cerebral infarction (HCC) [I63.9, G81.11]     Dysphagia due to recent cerebral infarction [I69.391]     Dysarthria due to acute stroke (HCC) [I63.9, R47.1]     KEYONA (acute kidney injury) ([facility] [address] [N17.9]     Essential hypertension [I10]     Ischemic stroke ([facility] [address] [I63.9] 2019     Date: 9/3/2019    CASE MANAGEMENT  Current issues/needs regarding patient and family discharge status: Patient lives in a single level home. Her daughter, Ray Goins, has been staying with her for about a year. Patient's son, Gui Davis, and daughter, Lea Luo, also assist with her needs. They may also hire additional assistance because Ray Goins has some health issues and was previously able to leave patient alone for a few hours.      PHYSICAL THERAPY   Short term goals  Time Frame for Short term goals: 7 days  Short term goal 1: Pt will perform bed mobility with Min A, demonstrating improved sequencing and coordination   Short term goal 2: Pt will perform sit/stand transfers with Min A up to a FWW, demonstrating improved upright posture   Short term goal 3: Pt will ambulate 100 ft, with a FWW CGA demonstrating improved overall endurance and activity tolerance   Short term goal 4: Pt will perform advanced ambulation, including ascending/descending a curb step and navigating uneven surfaces with Min A   Short term goal 5: Pt will ascend/descend 5 steps with CGA and bilateral handrails      Long term goals  Time Frame for Long term goals : 7-12 days  Long term goal 1: Pt will be SBA with bed mobility, with the exception of rolling   Long term goal 2: Pt will perform OOB transfers and car transfer with SBA,
prn  [x]Continuous supervision []Return home with spouse/family   [] Assisted living  []SNF     See team signature page for team members participating in today's conference. Goals have been updated to reflect recent status.       I have led this Team Conference and agree with the plan, Dennis Reyna MD, 8/27/2019, 1:00 PM    Team conference note transcribed this date by: Dorina Parker Anniece Re, Therapy Coordinator

## 2019-09-03 NOTE — PROGRESS NOTES
Occupational Therapy  Physical Rehabilitation: OCCUPATIONAL THERAPY     [x] daily progress note       [] discharge       Patient Name:  Ruy Calderon   :  1921 MRN: [id number]  Room:  [address] [place] [address] Date of Admission: 2019  Rehabilitation Diagnosis:   Acute cerebrovascular accident (CVA) ([facility] [address] [I63.9]       Date 9/3/2019       Day of ARU Week:  5   Time IN/OUT 1402/1512   Individual Tx Minutes 79   Group Tx Minutes    Co-Treat Minutes    Concurrent Tx Minutes    TOTAL Tx Time Mins 70   Variance Time    Variance Time []   Refusal due to:     []   Medical hold/reason:    []   Illness   []   Off Unit for test/procedure  [x]   Extra time needed to complete task: toileting at end of session  []   Therapeutic need  []   Other (specify):   Restrictions Restrictions/Precautions: Fall Risk, Weight Bearing(Hx of pacemaker)         Communication with other providers: [x]   OK to see per nursing:     []   Spoke with team member regarding:      Subjective observations and cognitive status: Pt in recliner on approach, daughter present. Asking questions about vision testing based on observations such as pt walking towards shower instead of toilet (toilet on R side of bathroom), as well as veering to the L in the hallway). After testing scanning/tracking and peripheral vision, this confirmed previous education about R inattention. Provided daughter additional education about this deficit, and strategies such as brightly colored visual aids and VCs/TCs to increase awareness to this side. Pain level/location:    3/10       Location: Back; per MAR already received Tylenol, also has Kristina Richards [phone]   Discharge recommendations  Anticipated discharge date:  09/10  Destination: []home alone   []home alone w assist prn   [] home w/ family    [x] Continuous supervision       []SNF    [] Assisted living     [] Other:   Continued therapy: [x]HHC OT  []OUTPATIENT  OT   [] No Further OT  Equipment needs: TBD; shower chair with back?  (Has chair without back currently)      Pawan Luis Alberto the assistance of a shower chair to successfully and safely complete bathing activities necessary due to reduced balance, endurance, need for ADL assist, and LE weakness and reduced ROM.  These tasks cannot be safely completed without this device and would place Analy Chong greater risk of falls. Toileting:   CGA, with increased time did not require cues for sequencing      Toilet Transfers:   CGA, cues for hand placement and RW management during turn  Device Used:    []   Standard Toilet         []   Grab Bars           [x]  Bedside Commode frame over toilet       []   Elevated Toilet          []   Other:        Bed Mobility:           [x]   Pt received out of bed     Transfers:    Sit--> Stand:  CGA-SBA  Stand --> Sit:   CGA-min A, max cues for hand placement   Stand-Pivot:   CGA  Other:    Assistive device required for transfer:   RW      Functional Mobility:    Assistance:  CGA- close SBA ~50 ft, cues to  R foot and to avoid doorways on R with RW  Device:   [x]   Rolling Walker     []   Standard Walker []   Wheelchair        []   [employer]       []   4-Wheeled Mozella Maha         []   Cardiac Walker       []   Other:        Additional Therapeutic activities/exercises completed this date:     [x]   ADL Training   [x]   Balance/Postural training:    [x]   Bed/Transfer Training   [x]   Endurance Training: Pt performed 3 stands, 4 mins + 3 mins + 4 mins, while completing tabletop ax requiring pt to manipulate 20 clothespins ranging from min-max resistance c RUE. Pt required min repeated cues for following instructions throughout task. Demo'ed increased difficulty with max resistance clothespins requiring increased time to manipulate. [x]   Neuromuscular Re-ed: To address RUE [address], as well as visual perceptual deficits, pt completed tabletop activity requiring pt to copy pattern with small pegboard.   Pt required significant VCs for initiation and problem solving for error ID/correction to accurately copy pattern. Pt able to perform tip pinch and in hand rotation c RUE with min increased time. []   Nu-step:  Time:        Level:         #Steps:       []   Rebounder:    []  Seated     []  Standing        []   Supine Ther Ex (reps/sets):     [x]   Seated Ther Ex (reps/sets): To increase BUE endurance for ADLs and transfers pt completed arm ergometer on min resistance x5 mins, average 20 RPM     []   Standing Ther Ex (reps/sets):     []   Other:      Comments: All intervention performed to increase pt's strength, endurance, ax tolerance, and balance in prep for increased I c ADL/IADLs and functional transfers/mobility. Patient/Caregiver Education and Training:   []   [employer] Equipment Use  [x]   Bed Mobility/Transfer Technique/Safety  []   Energy Conservation Tips  []   Family training  [x]   Postural Awareness  [x]   Safety During Functional Activities  []   Reinforced Patient's Precautions   []   Progress was updated and reviewed in Rehabtracker with patient and/or family this         date. Treatment Plan for Next Session: Continue OT POC      Assessment:  Pt requires cues to attend to R visual field; daughter was educated on inattention further today.       Treatment/Activity Tolerance:   [x] Tolerated treatment with no adverse effects    [] Patient limited by fatigue  [] Patient limited by pain   [] Patient limited by medical complications:    [] Adverse reaction to Tx:   [] Significant change in status    Safety:       []  bed alarm set    [x]  chair alarm set    []  Pt refused alarms                [x]  Telesitter activated      [x]  Gait belt used during tx session      []other:       Number of Minutes/Billable Intervention  Therapeutic Exercise    ADL Self-care 10   Neuro Re-Ed 25   Therapeutic Activity 35   Group    Other:    TOTAL 70       Social History  Social/Functional History  Lives With: Daughter(dtr lives with pt in pt's house)  Type of Home: House  Home Layout: One level  Home Access: Stairs to enter with rails  Entrance Stairs - Number of Steps: 1 step with L. grab bar  Bathroom Shower/Tub: Walk-in shower, Shower chair without back(4 inch ledge to step over to access walk-in)  Bathroom Toilet: Handicap height(with BSC ontop to increase height)  Bathroom Accessibility: Not accessible(won't be able to use a walker in bathroom)  Home Equipment: Quad cane, Alert [employer] Help From: Family  ADL Assistance: Needs assistance(her dtr assists with bathing, otherwise pt manages ADL)  Bath: Independent  Dressing: Minimal assistance(Daughter lays her clothing out.)  Grooming: Independent  Feeding: Minimal assistance(lately has been requiring additional assistance d/t increase fatigue within the past couple of months)  Toileting: Independent(Mod I )  Homemaking Assistance: Needs assistance(Pt will assist with folding laundry and drying dishes)  Homemaking Responsibilities: No(Pt does not need to complete, but she likes to help her daughter.)  Ambulation Assistance: Independent(CATALINO c cane within house)  Transfer Assistance: Independent  Active : No  Mode of Transportation: Family  Occupation: Retired  Type of occupation: Worked at [employer]: Watching game shows; [employer], Likes to read, dtr prepares meds in pill box and lays them out for pt. Family assists with finances  Additional Comments: Pt's daughter, Nataly Burgess, manages transportation, cooking, medication, and general supervision of pt. Pt had one fall prior to admission, with an additional occuring during her hospital stay. Visits family, plays cards, out to eat, grocery.     Objective                                                                                    Goals:  (Update in navigator)  Short term goals  Time Frame for Short term goals: 7 Days  Short term goal 1: Pt will complete supervision feeding   Short term goal 2: Pt will complete

## 2019-09-04 PROCEDURE — 1280000000 HC REHAB R&B

## 2019-09-04 PROCEDURE — 6370000000 HC RX 637 (ALT 250 FOR IP): Performed by: HOSPITALIST

## 2019-09-04 PROCEDURE — 97530 THERAPEUTIC ACTIVITIES: CPT

## 2019-09-04 PROCEDURE — 99232 SBSQ HOSP IP/OBS MODERATE 35: CPT | Performed by: PHYSICAL MEDICINE & REHABILITATION

## 2019-09-04 PROCEDURE — 97110 THERAPEUTIC EXERCISES: CPT

## 2019-09-04 PROCEDURE — 6370000000 HC RX 637 (ALT 250 FOR IP): Performed by: PHYSICAL MEDICINE & REHABILITATION

## 2019-09-04 PROCEDURE — 6360000002 HC RX W HCPCS: Performed by: PHYSICAL MEDICINE & REHABILITATION

## 2019-09-04 PROCEDURE — 97127 HC SP THER IVNTJ W/FOCUS COG FUNCJ: CPT

## 2019-09-04 PROCEDURE — 97116 GAIT TRAINING THERAPY: CPT

## 2019-09-04 PROCEDURE — 92507 TX SP LANG VOICE COMM INDIV: CPT

## 2019-09-04 RX ADMIN — ENOXAPARIN SODIUM 30 MG: 30 INJECTION SUBCUTANEOUS at 08:24

## 2019-09-04 RX ADMIN — DOCUSATE SODIUM 100 MG: 100 CAPSULE, LIQUID FILLED ORAL at 20:19

## 2019-09-04 RX ADMIN — LISINOPRIL 20 MG: 20 TABLET ORAL at 08:24

## 2019-09-04 RX ADMIN — CALCIUM CARBONATE 500 MG: 500 TABLET, CHEWABLE ORAL at 16:51

## 2019-09-04 RX ADMIN — ACETAMINOPHEN 650 MG: 325 TABLET ORAL at 17:22

## 2019-09-04 RX ADMIN — CIPROFLOXACIN HYDROCHLORIDE 250 MG: 250 TABLET, FILM COATED ORAL at 20:19

## 2019-09-04 RX ADMIN — POLYETHYLENE GLYCOL (3350) 17 G: 17 POWDER, FOR SOLUTION ORAL at 08:23

## 2019-09-04 RX ADMIN — CIPROFLOXACIN HYDROCHLORIDE 250 MG: 250 TABLET, FILM COATED ORAL at 08:23

## 2019-09-04 RX ADMIN — ASPIRIN 81 MG 81 MG: 81 TABLET ORAL at 08:23

## 2019-09-04 RX ADMIN — DOCUSATE SODIUM 100 MG: 100 CAPSULE, LIQUID FILLED ORAL at 08:24

## 2019-09-04 RX ADMIN — ACETAMINOPHEN 650 MG: 325 TABLET ORAL at 08:24

## 2019-09-04 RX ADMIN — PRAVASTATIN SODIUM 10 MG: 10 TABLET ORAL at 20:20

## 2019-09-04 RX ADMIN — CLOPIDOGREL BISULFATE 75 MG: 75 TABLET ORAL at 08:24

## 2019-09-04 RX ADMIN — POTASSIUM CHLORIDE 20 MEQ: 20 TABLET, EXTENDED RELEASE ORAL at 08:24

## 2019-09-04 RX ADMIN — CALCIUM POLYCARBOPHIL 625 MG TABLET 625 MG: at 08:24

## 2019-09-04 RX ADMIN — FAMOTIDINE 20 MG: 20 TABLET ORAL at 08:24

## 2019-09-04 RX ADMIN — LABETALOL HYDROCHLORIDE 100 MG: 100 TABLET, FILM COATED ORAL at 20:19

## 2019-09-04 RX ADMIN — MEMANTINE HYDROCHLORIDE 5 MG: 5 TABLET ORAL at 17:22

## 2019-09-04 RX ADMIN — LABETALOL HYDROCHLORIDE 100 MG: 100 TABLET, FILM COATED ORAL at 08:24

## 2019-09-04 RX ADMIN — HYDROCHLOROTHIAZIDE 25 MG: 25 TABLET ORAL at 08:24

## 2019-09-04 ASSESSMENT — PAIN SCALES - GENERAL
PAINLEVEL_OUTOF10: 2
PAINLEVEL_OUTOF10: 0
PAINLEVEL_OUTOF10: 2
PAINLEVEL_OUTOF10: 0

## 2019-09-04 ASSESSMENT — PAIN DESCRIPTION - PAIN TYPE
TYPE: CHRONIC PAIN
TYPE: CHRONIC PAIN

## 2019-09-04 ASSESSMENT — PAIN DESCRIPTION - LOCATION
LOCATION: BACK
LOCATION: BACK

## 2019-09-04 NOTE — PROGRESS NOTES
Perla Bhatia    : 1921  Acct #: [de-identified]  MRN: [id number]              PM&R Progress Note      Admitting diagnosis: Ischemic stroke with infarction.     Comorbid diagnoses impacting rehabilitation: Right hemiparesis, dysarthria, dysphagia, dementia with cognitive impairment, acute kidney injury, hypertension, obstipation     Chief complaint: Pleased with a heating pad. Prior (baseline) level of function: Independent. Current level of function:     Physical therapy:  Bed Mobility: Scooting: Contact guard assistance  Transfers: Sit to Stand:  Moderate Assistance, Maximum Assistance  Stand to sit: Minimal Assistance  Bed to Chair: Moderate assistance(Increased steadying required due to fatigue), Ambulation 1  Surface: level tile  Device: Rolling Walker  Assistance: Minimal assistance  Quality of Gait: forefoot at initial contact with LLE, FWD bent posture, increased unsteadiness with fatigue and resp rate; reduced step length with shuffling gait pattern   Distance: 10 ft (Min A for steadying) + 40 ft (max effort, with Min A) , Stairs  # Steps : 5  Stairs Height: (4/6)  Rails: Bilateral(Min A ascending, Mod A descending with pt using TC input in order to clear step)  Curbs: 4\"(FWW, Min A)  Mobility: Bed, Chair, Wheel Chair: 4 - Requires steadying assistance only <25% assist  and/or requires assist with one leg only  Walk: 5 - Supervision Requires standby supervision or cuing to walk at least 150 feet  Distance Walked: 170'  Stairs: 2- Maximal Assistance Performs 25-49% of the effort to go up and down 4 to 6 stairs and requires the assistance of one person only, PT Equipment Recommendations  Other: FWW,      Occupational therapy: Eatin - Feeds self with setup/supervision/cues and/or requires only setup/supervision/cues to perform tube feedings  Groomin - Able to perform 3-4 tasks with touching help  Bathin - Able to bathe 3-4 areas  Dressing-Upper: 3 - Requires assist with 2 tasks  Dressing-Lower: 1 - Total assist with lower body dressing  Toiletin - Requires steadying assistance only  Toilet Transfer: 4 - Requires steadying assistance only < 25% assist  Tub Transfer: 0 - Activity does not occur  Shower Transfer: 0 - Activity does not occur,  ,      Speech therapy:  Comprehension: 4 - Patient understands basic needs 75-90%+ of the time  Expression: 3 - Expresses basic ideas/needs 50-74% of the time  Social Interaction: 4 - Patient appropriate 75-90%+ of the time  Problem Solving: 3 - Patient solves simple/routine tasks 50%-74%  Memory: 3 - Patient remembers 50%-74% of the time    Exam:    Blood pressure 138/63, pulse 67, temperature 97.3 °F (36.3 °C), temperature source Oral, resp. rate 18, height 5' 7\" (1.702 m), weight 138 lb 9.6 oz (62.9 kg), SpO2 99 %. General: More alert today. Still slow with eating and feeding herself. Staff reports more time up on her feet. HEENT: Mucous membranes are moist.  Speech is soft and slurred and overall vocalizations are quite limited. Pulmonary: Chest is clear. Cardiac: Regular rate and rhythm. Abdomen: Patient's abdomen is soft and nondistended. Bowel sounds were present throughout. There was no rebound, guarding or masses noted. Upper extremities: Slow and clumsy with right hand positioning on her walker.  strength is still asymmetric. Lower extremities: Wide base of support with standing. Slow and deliberate with advancing the right leg with each step. No signs of DVT. Heels are clear. Sitting balance was fair+.   Standing balance was fair-.    Lab Results   Component Value Date    WBC 7.9 2019    HGB 12.2 (L) 2019    HCT 36.8 (L) 2019    MCV 90.2 2019     2019     Lab Results   Component Value Date    INR 1.00 2019    INR 0.97 02/10/2015    PROTIME 11.6 2019    PROTIME 11.1 02/10/2015     Lab Results   Component Value Date    CREATININE 1.0 2019    BUN 27 (H) 09/01/2019     (L) 09/01/2019    K 4.0 09/01/2019    CL 94 (L) 09/01/2019    CO2 26 09/01/2019     Lab Results   Component Value Date    ALT 18 09/01/2019    AST 21 09/01/2019    ALKPHOS 95 09/01/2019    BILITOT 0.4 09/01/2019       Expected length of stay  prior to a supervised level of function for discharge home with a walker and [address] OT/PT is 9/10/2019. Recommendations:    1. Ischemic stroke with infarction: Recently, better tolerance for her daily occupational and physical therapy with speech-language pathology. Arellano Overcast modified diet and attention to swallowing in speech therapy. Loretta Esteban for the antiplatelet therapy (baby aspirin and Plavix).  GI prophylaxis offered.  Must attend to her elevated blood pressure, obstipation and nutritional support.    Pulmonary hygiene and DVT prophylaxis emphasized.  Some right inattention and morning lethargy interferes with therapy. She does better with an elevated SBP. 2. DVT prophylaxis: Lovenox 30 mg subcu daily.  I must monitor her hemoglobin and platelet count while on this medication.  Weightbearing activities are slowly increasing.  GI prophylaxis tolerated.  Struggling with left neglect. No s/s of acute blood loss. 3. Uncontrolled hypertension: Patient requires labetalol, hydrochlorothiazide and lisinopril.  Target systolic blood pressure is 140-150. .  Consistent oral intake encouraged.  Vital signs are checked at before meals and at bedtime.   Wanting to avoid drops in blood pressure or dizziness which aggressive HTN mgmt has worsened recently. 4. Acute kidney injury:  Minimize blood pressure fluctuations, provide oral hydration and monitor renal function closely.  Avoiding nephrotoxic medications.  Adjusted Lovenox for renal dosing.  Encouragement to maximize oral intake.   5. Dementia: Seems to have worsened significantly recently at home PTA.  Neurology has started Namenda. Monitoring her response to this

## 2019-09-04 NOTE — PROGRESS NOTES
Nutrition Assessment    Type and Reason for Visit: Reassess    Nutrition Recommendations: Continue general diet    Continue to offer frozen oral nutrition supplement during stay     Nutrition Assessment: Meal intake remains % on general diet with magic cups offered. Eats better at lunch and supper, not much for breakfast but eating meal during stay. Eats very slowly. Remains low nutrition risk at this time.       Malnutrition Assessment:  · Malnutrition Status: No malnutrition  · Context: Acute illness or injury    Nutrition Risk Level: Low    Nutrient Needs:  · Estimated Daily Total Kcal: 3902-9941 (25-30 kcal/kg current weight)   · Estimated Daily Protein (g): 63-75 (1-1.2 g/kg current weight)   · Estimated Daily Total Fluid (ml/day): 7673-6328 (1ml/radha)     Nutrition Diagnosis:   · Problem: Predicted suboptimal energy intake  · Etiology: related to Cognitive or neurological impairment     Signs and symptoms:  as evidenced by Other (Comment)(variance in meal intake )    Objective Information:  · Nutrition-Focused Physical Findings: siiting up in chair eating lunch, daughter present in room   · Wound Type: Skin Tears  · Current Nutrition Therapies:  · Oral Diet Orders: General   · Oral Diet intake: 51-75%, %  · Oral Nutrition Supplement (ONS) Orders: Frozen Oral Supplement  · ONS intake: %  · Anthropometric Measures:  · Ht: 5' 7\" (170.2 cm)   · Current Body Wt: 138 lb 10.7 oz (62.9 kg)  · Admission Body Wt: 139 lb 15.9 oz (63.5 kg)(bedscale )  · Usual Body Wt: 140 lb (63.5 kg)(past year per hx)  · % Weight Change:  ,  no loss reported   · Ideal Body Wt: 135 lb (61.2 kg), % Ideal Body 103  · BMI Classification: BMI 18.5 - 24.9 Normal Weight(BMI-22 )    Nutrition Interventions:   Continue current diet, Continue current ONS  Continued Inpatient Monitoring, Education not appropriate at this time    Nutrition Evaluation:   · Evaluation: Progressing toward goals   · Goals: Patient will consume at

## 2019-09-04 NOTE — PROGRESS NOTES
[facility] UNIT  SPEECH/LANGUAGE PATHOLOGY      [x] Daily           [] Discharge    Patient:Analy Costa      :1921  [facility]:[id number]  Rehab Dx/Hx: Acute cerebrovascular accident (CVA) ([facility] [address] [I63.9]   No Known Allergies  Precautions:  Restrictions/Precautions: Fall Risk, Weight Bearing(Hx of pacemaker)          Home Situation/IADL:   Social/Functional History  Lives With: Daughter(dtr lives with pt in pt's house)  Type of Home: House  Home Layout: One level  Home Access: Stairs to enter with rails  Entrance Stairs - Number of Steps: 1 step with L. grab bar  Bathroom Shower/Tub: Walk-in shower, Shower chair without back(4 inch ledge to step over to access walk-in)  Bathroom Toilet: Handicap height(with BSC ontop to increase height)  Bathroom Accessibility: Not accessible(won't be able to use a walker in bathroom)  Home Equipment: Quad cane, Alert [employer] Help From: Family  ADL Assistance: Needs assistance(her dtr assists with bathing, otherwise pt manages ADL)  Bath: Independent  Dressing: Minimal assistance(Daughter lays her clothing out.)  Grooming: Independent  Feeding: Minimal assistance(lately has been requiring additional assistance d/t increase fatigue within the past couple of months)  Toileting: Independent(Mod I )  Homemaking Assistance: Needs assistance(Pt will assist with folding laundry and drying dishes)  Homemaking Responsibilities: No(Pt does not need to complete, but she likes to help her daughter.)  Ambulation Assistance: Independent(CATALINO c cane within house)  Transfer Assistance: Independent  Active : No  Mode of Transportation: Family  Occupation: Retired  Type of occupation: Worked at [employer]: Watching game shows; [employer], Likes to read, dtr prepares meds in pill box and lays them out for pt.  Family assists with finances  Additional Comments: Pt's daughter, Ray Goins, manages transportation, cooking, medication, appropriate solutions. The Total KPT score is a broader test that combines practical judgment with communication skills and visual memory. Because impairment in verbal skills and visual memory are often seen in persons with advanced dementia, the Total KPT score can be useful when there are concerns about larger questions of independent living and safety. Dalila Treadwell achieved a total score of 9/21    This KPT Practical Judgment Subtest score indicates  judgment. Persons with this score typically indicate if there are  problems with executive functions and could support an underlying dementia. Dalila Treadwell achieved a total score of 2/8    Problem solving required leading questions and to assist with language retrieval prompts were 'show me' or 'find this'. For familiar tasks such as retrieving the dogs name \"Elias\" pt related to Uncle and was able to retrieve with fewer cues. Choice cues for common tasks showed fluctuating performance. Dtr stated, \"We as a family are meeting with a facility to look at Assisted Living. Mom is too involved for us to care for on our own now and this just showed it. \"  SLP feels if pt in a more familiar environment with more normal routine she may improve with recall and sequencing and even problem solving. Reviewed use of call button which continues to require cues. Dtr reports pt will impulsively get up. Goal 2: Pt will use strategies to improve word retrieval in structured tasks with mod cues and 80% acc. Naming of objects required min cues and extra processing time with 40% acc and carriers and phonemic cues. Use of animal assisted therapy to target questions and discussion required mod cues and extra processing time, leading questions, and  Phonemic cues. Non-fluent aphasia.    Goal 3: Pt will improve vocal volume and range via sustained phonation tasks, incentive spirometer, and pitch variation tasks for improved communication to 90% intelligible for basic

## 2019-09-04 NOTE — PROGRESS NOTES
Occupational Therapy  Physical Rehabilitation: OCCUPATIONAL THERAPY     [x] daily progress note       [] discharge       Patient Name:  Lisa Castillo   :  1921 MRN: [id number]  Room:  [address] [place] [address] Date of Admission: 2019  Rehabilitation Diagnosis:   Acute cerebrovascular accident (CVA) ([facility] [address] [I63.9]       Date 2019       Day of ARU Week:  6   Time IN/OUT 10:48/11:48   Individual Tx Minutes 60   Group Tx Minutes    Co-Treat Minutes    Concurrent Tx Minutes    TOTAL Tx Time Mins 60   Variance Time    Variance Time []   Refusal due to:     []   Medical hold/reason:    []   Illness   []   Off Unit for test/procedure  []   Extra time needed to complete task  []   Therapeutic need  []   Other (specify):   Restrictions Restrictions/Precautions: Fall Risk, Weight Bearing(Hx of pacemaker)         Communication with other providers: [x]   OK to see per nursing:     []   Spoke with team member regarding:      Subjective observations and cognitive status: Pt sitting upright in recliner, pleasant and agreeable to therapy session. Pain level/location:    0/10       Location:    Discharge recommendations  Anticipated discharge date:  9-10  Destination: []home alone   []home alone w assist prn   [] home w/ family    [x] Continuous supervision       []SNF    [] Assisted living     [] Other:   Continued therapy: [x]HHC OT  []OUTPATIENT  OT   [] No Further OT  Equipment needs: TBD; shower chair with back? (Has chair without back currently)      Toribio Bosworth the assistance of a shower chair to successfully and safely complete bathing activities necessary due to reduced balance, endurance, need for ADL assist, and LE weakness and reduced ROM.  These tasks cannot be safely completed without this device and would place Analy Chong greater risk of falls.      Toileting: Declines need       Toilet Transfers:  n/a  Device Used:    []   Standard Toilet         []   Kaitlin Dumont           []  Bedside Commode []   Elevated Toilet          []   Other:        Bed Mobility:           [x]   Pt received out of bed     Transfers:    Sit--> Stand:  SBA-CGA  Stand --> Sit:  SBA-CGA  Stand-Pivot:   CGA  Other:    Assistive device required for transfer: FWW      Functional Mobility:    Assistance: GGA; min VC given for FWW management for (~40') total  Device:   [x]   Rolling Walker     []   Standard Walker []   Wheelchair        []   [employer]       []   4-Wheeled Darion Rucks         []   Cardiac Darion Rucks       []   Other:        Homemaking Tasks:  n/a      Additional Therapeutic activities/exercises completed this date:     []   ADL Training   []   Balance/Postural training     []   Bed/Transfer Training   [x]   Endurance Training:therapist engages Pt in ~10 min on arm ergometer for improved UB endurance w/1 rest break in between for improved UB endurance/ROM in prep for ADL's. [x]   Neuromuscular Re-ed: Therapist engages Pt in neuro re-ed task using yellow theraputty using B/L hands requiring inc. Time to complete (>15 min on this date). Pt with inc. SOB during task and educated on pursed lip breathing for improved task completion. []   Nu-step:  Time:        Level:         #Steps:       []   Rebounder:    []  Seated     []  Standing        []   Supine Ther Ex (reps/sets):     [x]   Seated Ther Ex (reps/sets): Therapist engages Pt in 3x10 protraction/retraction's and bicep curls using 2lb straight wt.    []   Standing Ther Ex (reps/sets):     []   Other:      Comments:      Patient/Caregiver Education and Training:   []   Adaptive Equipment Use  []   Bed Mobility/Transfer Technique/Safety  []   Energy Conservation Tips  []   Family training  [x]   Postural Awareness  [x]   Safety During Functional Activities  []   Reinforced Patient's Precautions   []   Progress was updated and reviewed in Rehabtracker with patient and/or family this         date. Treatment Plan for Next Session: Cont.  OT POC      Assessment:  Pt is primarily limited by fatigue on this date requiring redirection to task at times as well as encouragement to complete UB exercises.        Treatment/Activity Tolerance:   [x] Tolerated treatment with no adverse effects    [x] Patient limited by fatigue  [] Patient limited by pain   [] Patient limited by medical complications:    [] Adverse reaction to Tx:   [] Significant change in status    Safety:       []  bed alarm set    [x]  chair alarm set    []  Pt refused alarms                []  Telesitter activated      [x]  Gait belt used during tx session      []other:       Number of Minutes/Billable Intervention  Therapeutic Exercise 30   ADL Self-care    Neuro Re-Ed    Therapeutic Activity 30   Group    Other:    TOTAL 60       Social History  Social/Functional History  Lives With: Daughter(dtr lives with pt in pt's house)  Type of Home: House  Home Layout: One level  Home Access: Stairs to enter with rails  Entrance Stairs - Number of Steps: 1 step with L. grab bar  Bathroom Shower/Tub: Walk-in shower, Shower chair without back(4 inch ledge to step over to access walk-in)  Bathroom Toilet: Handicap height(with BSC ontop to increase height)  Bathroom Accessibility: Not accessible(won't be able to use a walker in bathroom)  Home Equipment: Quad cane, Alert [employer] Help From: Family  ADL Assistance: Needs assistance(her dtr assists with bathing, otherwise pt manages ADL)  Bath: Independent  Dressing: Minimal assistance(Daughter lays her clothing out.)  Grooming: Independent  Feeding: Minimal assistance(lately has been requiring additional assistance d/t increase fatigue within the past couple of months)  Toileting: Independent(Mod I )  Homemaking Assistance: Needs assistance(Pt will assist with folding laundry and drying dishes)  Homemaking Responsibilities: No(Pt does not need to complete, but she likes to help her daughter.)  Ambulation Assistance: Independent(CATALINO c cane within house)  Transfer Assistance:

## 2019-09-04 NOTE — PROGRESS NOTES
of Home: House  Home Layout: One level  Home Access: Stairs to enter with rails  Entrance Stairs - Number of Steps: 1 step with L. grab bar  Bathroom Shower/Tub: Walk-in shower, Shower chair without back(4 inch ledge to step over to access walk-in)  Bathroom Toilet: Handicap height(with BSC ontop to increase height)  Bathroom Accessibility: Not accessible(won't be able to use a walker in bathroom)  Home Equipment: Quad cane, Alert [employer] Help From: Family  ADL Assistance: Needs assistance(her dtr assists with bathing, otherwise pt manages ADL)  Bath: Independent  Dressing: Minimal assistance(Daughter lays her clothing out.)  Grooming: Independent  Feeding: Minimal assistance(lately has been requiring additional assistance d/t increase fatigue within the past couple of months)  Toileting: Independent(Mod I )  Homemaking Assistance: Needs assistance(Pt will assist with folding laundry and drying dishes)  Homemaking Responsibilities: No(Pt does not need to complete, but she likes to help her daughter.)  Ambulation Assistance: Independent(CATALINO c cane within house)  Transfer Assistance: Independent  Active : No  Mode of Transportation: Family  Occupation: Retired  Type of occupation: Worked at [employer]: Watching game shows; [employer], Likes to read, dtr prepares meds in pill box and lays them out for pt. Family assists with finances  Additional Comments: Pt's daughter, Keven Curtis, manages transportation, cooking, medication, and general supervision of pt. Pt had one fall prior to admission, with an additional occuring during her hospital stay. Visits family, plays cards, out to eat, grocery.     Objective                                                                                    Goals:  (Update in navigator)  Short term goals  Time Frame for Short term goals: 7 days  Short term goal 1: Pt will perform bed mobility with Min A, demonstrating improved sequencing and coordination Short term goal 2: Pt will perform sit/stand transfers with Min A up to a FWW, demonstrating improved upright posture   Short term goal 3: Pt will ambulate 100 ft, with a FWW CGA demonstrating improved overall endurance and activity tolerance   Short term goal 4: Pt will perform advanced ambulation, including ascending/descending a curb step and navigating uneven surfaces with Min A   Short term goal 5: Pt will ascend/descend 5 steps with CGA and bilateral handrails :  Long term goals  Time Frame for Long term goals : 7-12 days  Long term goal 1: Pt will be SBA with bed mobility, with the exception of rolling   Long term goal 2: Pt will perform OOB transfers and car transfer with SBA, using LRAD   Long term goal 3: Pt will ambulate 150 ft with LRAD, demonstrating improved endurance and safety awareness throughout functional tasks   Long term goal 4: Pt will perform advanced ambulation, including ascending a curb step, navigating uneven surfaces and advanced standing balance activities including picking an object off the ground with SBA  Long term goal 5: Pt will ascend/descend 12 steps with CGA:        Plan of Care                                                                              Times per week: 5 days per week for a minimum of 60 minutes/day plus group as appropriate for 60 minutes.   Treatment to include Current Treatment Recommendations: Strengthening, Functional Mobility Training, Home Exercise Program, Equipment Evaluation, Education, & procurement, Manual Therapy - Soft Tissue Mobilization, Manual Therapy - Joint Manipulation, Cognitive/Perceptual Training, Gait Training, Transfer Training, ROM, Balance Training, Endurance Training, Stair training, Safety Education & Training, Modalities, Positioning, Pain Management, Patient/Caregiver Education & Training, Neuromuscular Re-education    Electronically signed by   Maryjo Raymundo PT, DPT #[id number]  9/4/2019, 3:57 PM

## 2019-09-04 NOTE — PROGRESS NOTES
MSW talked with patient's son at length about the options for discharge to facility versus home with hired caregivers. MSW also reviewed long-term care policy with him. MSW also talked to patient's daughter, Lakesha Barnett, who reports that she is most interested in assisted living [facility]. They plan to meet with Mattrufino Bryant from [facility] again later today. Lakesha Barnett [facility] longer [facility]  9/4/2019, 12:17 PM

## 2019-09-05 ENCOUNTER — APPOINTMENT (OUTPATIENT)
Dept: GENERAL RADIOLOGY | Age: 84
DRG: 057 | End: 2019-09-05
Attending: PHYSICAL MEDICINE & REHABILITATION
Payer: MEDICARE

## 2019-09-05 LAB
ALBUMIN SERPL-MCNC: 3.9 GM/DL (ref 3.4–5)
ALP BLD-CCNC: 115 IU/L (ref 40–129)
ALT SERPL-CCNC: 21 U/L (ref 10–40)
ANION GAP SERPL CALCULATED.3IONS-SCNC: 13 MMOL/L (ref 4–16)
AST SERPL-CCNC: 23 IU/L (ref 15–37)
BILIRUB SERPL-MCNC: 0.2 MG/DL (ref 0–1)
BUN BLDV-MCNC: 36 MG/DL (ref 6–23)
CALCIUM SERPL-MCNC: 10 MG/DL (ref 8.3–10.6)
CHLORIDE BLD-SCNC: 99 MMOL/L (ref 99–110)
CO2: 25 MMOL/L (ref 21–32)
CREAT SERPL-MCNC: 1.2 MG/DL (ref 0.6–1.1)
GFR AFRICAN AMERICAN: 50 ML/MIN/1.73M2
GFR NON-AFRICAN AMERICAN: 41 ML/MIN/1.73M2
GLUCOSE BLD-MCNC: 108 MG/DL (ref 70–99)
HCT VFR BLD CALC: 36.7 % (ref 37–47)
HEMOGLOBIN: 12.2 GM/DL (ref 12.5–16)
MCH RBC QN AUTO: 30.6 PG (ref 27–31)
MCHC RBC AUTO-ENTMCNC: 33.2 % (ref 32–36)
MCV RBC AUTO: 92 FL (ref 78–100)
PDW BLD-RTO: 13.3 % (ref 11.7–14.9)
PLATELET # BLD: 342 K/CU MM (ref 140–440)
PMV BLD AUTO: 10.4 FL (ref 7.5–11.1)
POTASSIUM SERPL-SCNC: 4.4 MMOL/L (ref 3.5–5.1)
RBC # BLD: 3.99 M/CU MM (ref 4.2–5.4)
SODIUM BLD-SCNC: 137 MMOL/L (ref 135–145)
TOTAL PROTEIN: 6 GM/DL (ref 6.4–8.2)
TROPONIN T: <0.01 NG/ML
WBC # BLD: 6.2 K/CU MM (ref 4–10.5)

## 2019-09-05 PROCEDURE — 6370000000 HC RX 637 (ALT 250 FOR IP): Performed by: HOSPITALIST

## 2019-09-05 PROCEDURE — 97116 GAIT TRAINING THERAPY: CPT

## 2019-09-05 PROCEDURE — 84484 ASSAY OF TROPONIN QUANT: CPT

## 2019-09-05 PROCEDURE — 93005 ELECTROCARDIOGRAM TRACING: CPT | Performed by: HOSPITALIST

## 2019-09-05 PROCEDURE — 74018 RADEX ABDOMEN 1 VIEW: CPT

## 2019-09-05 PROCEDURE — 97535 SELF CARE MNGMENT TRAINING: CPT

## 2019-09-05 PROCEDURE — 97530 THERAPEUTIC ACTIVITIES: CPT

## 2019-09-05 PROCEDURE — 99232 SBSQ HOSP IP/OBS MODERATE 35: CPT | Performed by: PHYSICAL MEDICINE & REHABILITATION

## 2019-09-05 PROCEDURE — 1280000000 HC REHAB R&B

## 2019-09-05 PROCEDURE — C9113 INJ PANTOPRAZOLE SODIUM, VIA: HCPCS | Performed by: HOSPITALIST

## 2019-09-05 PROCEDURE — 6370000000 HC RX 637 (ALT 250 FOR IP)

## 2019-09-05 PROCEDURE — 71045 X-RAY EXAM CHEST 1 VIEW: CPT

## 2019-09-05 PROCEDURE — 6360000002 HC RX W HCPCS: Performed by: HOSPITALIST

## 2019-09-05 PROCEDURE — 85027 COMPLETE CBC AUTOMATED: CPT

## 2019-09-05 PROCEDURE — 6360000002 HC RX W HCPCS: Performed by: PHYSICAL MEDICINE & REHABILITATION

## 2019-09-05 PROCEDURE — 92507 TX SP LANG VOICE COMM INDIV: CPT

## 2019-09-05 PROCEDURE — 6360000002 HC RX W HCPCS

## 2019-09-05 PROCEDURE — 97127 HC SP THER IVNTJ W/FOCUS COG FUNCJ: CPT

## 2019-09-05 PROCEDURE — 6370000000 HC RX 637 (ALT 250 FOR IP): Performed by: PHYSICAL MEDICINE & REHABILITATION

## 2019-09-05 PROCEDURE — 80053 COMPREHEN METABOLIC PANEL: CPT

## 2019-09-05 RX ORDER — PANTOPRAZOLE SODIUM 40 MG/10ML
INJECTION, POWDER, LYOPHILIZED, FOR SOLUTION INTRAVENOUS
Status: DISCONTINUED
Start: 2019-09-05 | End: 2019-09-06

## 2019-09-05 RX ORDER — MORPHINE SULFATE 2 MG/ML
2 INJECTION, SOLUTION INTRAMUSCULAR; INTRAVENOUS ONCE
Status: COMPLETED | OUTPATIENT
Start: 2019-09-05 | End: 2019-09-05

## 2019-09-05 RX ORDER — MORPHINE SULFATE 2 MG/ML
INJECTION, SOLUTION INTRAMUSCULAR; INTRAVENOUS
Status: COMPLETED
Start: 2019-09-05 | End: 2019-09-05

## 2019-09-05 RX ORDER — PANTOPRAZOLE SODIUM 40 MG/10ML
40 INJECTION, POWDER, LYOPHILIZED, FOR SOLUTION INTRAVENOUS ONCE
Status: COMPLETED | OUTPATIENT
Start: 2019-09-05 | End: 2019-09-05

## 2019-09-05 RX ORDER — MAGNESIUM HYDROXIDE/ALUMINUM HYDROXICE/SIMETHICONE 120; 1200; 1200 MG/30ML; MG/30ML; MG/30ML
30 SUSPENSION ORAL EVERY 6 HOURS PRN
Status: DISCONTINUED | OUTPATIENT
Start: 2019-09-05 | End: 2019-09-10 | Stop reason: HOSPADM

## 2019-09-05 RX ORDER — NITROGLYCERIN 0.4 MG/1
0.4 TABLET SUBLINGUAL EVERY 5 MIN PRN
Status: DISCONTINUED | OUTPATIENT
Start: 2019-09-05 | End: 2019-09-10 | Stop reason: HOSPADM

## 2019-09-05 RX ORDER — NITROGLYCERIN 0.4 MG/1
TABLET SUBLINGUAL
Status: COMPLETED
Start: 2019-09-05 | End: 2019-09-05

## 2019-09-05 RX ADMIN — CLOPIDOGREL BISULFATE 75 MG: 75 TABLET ORAL at 08:12

## 2019-09-05 RX ADMIN — CIPROFLOXACIN HYDROCHLORIDE 250 MG: 250 TABLET, FILM COATED ORAL at 08:12

## 2019-09-05 RX ADMIN — MEMANTINE HYDROCHLORIDE 5 MG: 5 TABLET ORAL at 18:02

## 2019-09-05 RX ADMIN — ASPIRIN 81 MG 81 MG: 81 TABLET ORAL at 08:12

## 2019-09-05 RX ADMIN — ENOXAPARIN SODIUM 30 MG: 30 INJECTION SUBCUTANEOUS at 08:13

## 2019-09-05 RX ADMIN — POLYETHYLENE GLYCOL (3350) 17 G: 17 POWDER, FOR SOLUTION ORAL at 08:13

## 2019-09-05 RX ADMIN — DOCUSATE SODIUM 100 MG: 100 CAPSULE, LIQUID FILLED ORAL at 21:59

## 2019-09-05 RX ADMIN — POTASSIUM CHLORIDE 20 MEQ: 20 TABLET, EXTENDED RELEASE ORAL at 08:12

## 2019-09-05 RX ADMIN — ACETAMINOPHEN 650 MG: 325 TABLET ORAL at 08:12

## 2019-09-05 RX ADMIN — HYDROCHLOROTHIAZIDE 25 MG: 25 TABLET ORAL at 08:13

## 2019-09-05 RX ADMIN — FAMOTIDINE 20 MG: 20 TABLET ORAL at 08:13

## 2019-09-05 RX ADMIN — NITROGLYCERIN 0.4 MG: 0.4 TABLET, ORALLY DISINTEGRATING SUBLINGUAL at 19:34

## 2019-09-05 RX ADMIN — LABETALOL HYDROCHLORIDE 100 MG: 100 TABLET, FILM COATED ORAL at 06:44

## 2019-09-05 RX ADMIN — LISINOPRIL 20 MG: 20 TABLET ORAL at 08:12

## 2019-09-05 RX ADMIN — PANTOPRAZOLE SODIUM 40 MG: 40 INJECTION, POWDER, FOR SOLUTION INTRAVENOUS at 20:02

## 2019-09-05 RX ADMIN — MORPHINE SULFATE 2 MG: 2 INJECTION, SOLUTION INTRAMUSCULAR; INTRAVENOUS at 19:49

## 2019-09-05 RX ADMIN — DOCUSATE SODIUM 100 MG: 100 CAPSULE, LIQUID FILLED ORAL at 08:13

## 2019-09-05 RX ADMIN — CALCIUM CARBONATE 500 MG: 500 TABLET, CHEWABLE ORAL at 19:14

## 2019-09-05 RX ADMIN — PRAVASTATIN SODIUM 10 MG: 10 TABLET ORAL at 22:00

## 2019-09-05 RX ADMIN — CALCIUM POLYCARBOPHIL 625 MG TABLET 625 MG: at 08:13

## 2019-09-05 RX ADMIN — CIPROFLOXACIN HYDROCHLORIDE 250 MG: 250 TABLET, FILM COATED ORAL at 21:59

## 2019-09-05 RX ADMIN — LABETALOL HYDROCHLORIDE 100 MG: 100 TABLET, FILM COATED ORAL at 22:00

## 2019-09-05 RX ADMIN — NITROGLYCERIN 0.4 MG: 0.4 TABLET SUBLINGUAL at 19:34

## 2019-09-05 ASSESSMENT — PAIN SCALES - GENERAL
PAINLEVEL_OUTOF10: 6
PAINLEVEL_OUTOF10: 10
PAINLEVEL_OUTOF10: 3

## 2019-09-05 NOTE — PROGRESS NOTES
Neuromuscular Re-ed   []   Nu-step:  Time:        Level:         #Steps:       []   Rebounder:    []  Seated     []  Standing        []   Supine Ther Ex (reps/sets):     []   Seated Ther Ex (reps/sets):     []   Standing Ther Ex (reps/sets):     []   Other:      Comments: All therapeutic intervention performed c emphasis on cognitive re-training / dynamic balance / standing tolerance to inc strength, endurance and act tolerance for inc Indep c ADL tasks, func transfers / mobility. Patient/Caregiver Education and Training:   [x]   [employer] Equipment Use - Education during ADLs, requires cues, demonstrates decreased act tolerance for LB dressing  [x]   Transfer Technique/Safety  [x]   Energy Conservation Tips  []   Family training  []   Postural Awareness  [x]   Safety During Functional Activities  []   Reinforced Patient's Precautions   [x]   Progress was updated and reviewed in Rehabtracker with patient and/or family this         date. Treatment Plan for Next Session: Continue per OT POC. Assessment:  Pt appears limited by decreased cognition.     Treatment/Activity Tolerance:   [x] Tolerated treatment with no adverse effects    [x] Patient limited by fatigue  [] Patient limited by pain   [] Patient limited by medical complications:    [] Adverse reaction to Tx:   [] Significant change in status    Safety:       []  bed alarm set    [x]  chair alarm set    []  Pt refused alarms                []  Telesitter activated      [x]  Gait belt used during tx session      [x]other: Call light and all needs within reach     Number of Minutes/Billable Intervention  Therapeutic Exercise    ADL Self-care 60   Neuro Re-Ed    Therapeutic Activity    Group    Other:    TOTAL 60       Social History  Social/Functional History  Lives With: Daughter(dtr lives with pt in pt's house)  Type of Home: House  Home Layout: One level  Home Access: Stairs to enter with rails  Entrance Stairs - Number of Steps: 1 step with L.

## 2019-09-06 LAB
CULTURE: ABNORMAL
EKG ATRIAL RATE: 72 BPM
EKG DIAGNOSIS: NORMAL
EKG P AXIS: 70 DEGREES
EKG P-R INTERVAL: 170 MS
EKG Q-T INTERVAL: 398 MS
EKG QRS DURATION: 84 MS
EKG QTC CALCULATION (BAZETT): 435 MS
EKG R AXIS: 20 DEGREES
EKG T AXIS: 34 DEGREES
EKG VENTRICULAR RATE: 72 BPM
Lab: ABNORMAL
SPECIMEN: ABNORMAL
TOTAL COLONY COUNT: ABNORMAL
TROPONIN T: <0.01 NG/ML
TROPONIN T: <0.01 NG/ML

## 2019-09-06 PROCEDURE — 97112 NEUROMUSCULAR REEDUCATION: CPT

## 2019-09-06 PROCEDURE — 6360000002 HC RX W HCPCS: Performed by: PHYSICAL MEDICINE & REHABILITATION

## 2019-09-06 PROCEDURE — 97116 GAIT TRAINING THERAPY: CPT

## 2019-09-06 PROCEDURE — 97530 THERAPEUTIC ACTIVITIES: CPT

## 2019-09-06 PROCEDURE — 6370000000 HC RX 637 (ALT 250 FOR IP): Performed by: HOSPITALIST

## 2019-09-06 PROCEDURE — 84484 ASSAY OF TROPONIN QUANT: CPT

## 2019-09-06 PROCEDURE — 92507 TX SP LANG VOICE COMM INDIV: CPT

## 2019-09-06 PROCEDURE — 99232 SBSQ HOSP IP/OBS MODERATE 35: CPT | Performed by: PHYSICAL MEDICINE & REHABILITATION

## 2019-09-06 PROCEDURE — 6370000000 HC RX 637 (ALT 250 FOR IP): Performed by: PHYSICAL MEDICINE & REHABILITATION

## 2019-09-06 PROCEDURE — 36415 COLL VENOUS BLD VENIPUNCTURE: CPT

## 2019-09-06 PROCEDURE — 1280000000 HC REHAB R&B

## 2019-09-06 PROCEDURE — 93010 ELECTROCARDIOGRAM REPORT: CPT | Performed by: INTERNAL MEDICINE

## 2019-09-06 PROCEDURE — 97535 SELF CARE MNGMENT TRAINING: CPT

## 2019-09-06 RX ORDER — PANTOPRAZOLE SODIUM 40 MG/1
40 TABLET, DELAYED RELEASE ORAL
Status: DISCONTINUED | OUTPATIENT
Start: 2019-09-06 | End: 2019-09-10 | Stop reason: HOSPADM

## 2019-09-06 RX ORDER — CIPROFLOXACIN 250 MG/1
250 TABLET, FILM COATED ORAL
Status: DISCONTINUED | OUTPATIENT
Start: 2019-09-06 | End: 2019-09-07

## 2019-09-06 RX ADMIN — ACETAMINOPHEN 650 MG: 325 TABLET ORAL at 09:19

## 2019-09-06 RX ADMIN — ASPIRIN 81 MG 81 MG: 81 TABLET ORAL at 09:19

## 2019-09-06 RX ADMIN — POTASSIUM CHLORIDE 20 MEQ: 20 TABLET, EXTENDED RELEASE ORAL at 09:19

## 2019-09-06 RX ADMIN — PRAVASTATIN SODIUM 10 MG: 10 TABLET ORAL at 20:21

## 2019-09-06 RX ADMIN — ENOXAPARIN SODIUM 30 MG: 30 INJECTION SUBCUTANEOUS at 09:19

## 2019-09-06 RX ADMIN — HYDROCHLOROTHIAZIDE 25 MG: 25 TABLET ORAL at 09:19

## 2019-09-06 RX ADMIN — CIPROFLOXACIN HYDROCHLORIDE 250 MG: 250 TABLET, FILM COATED ORAL at 15:23

## 2019-09-06 RX ADMIN — DOCUSATE SODIUM 100 MG: 100 CAPSULE, LIQUID FILLED ORAL at 20:21

## 2019-09-06 RX ADMIN — MEMANTINE HYDROCHLORIDE 5 MG: 5 TABLET ORAL at 17:23

## 2019-09-06 RX ADMIN — POLYETHYLENE GLYCOL (3350) 17 G: 17 POWDER, FOR SOLUTION ORAL at 09:19

## 2019-09-06 RX ADMIN — LISINOPRIL 20 MG: 20 TABLET ORAL at 09:19

## 2019-09-06 RX ADMIN — LABETALOL HYDROCHLORIDE 100 MG: 100 TABLET, FILM COATED ORAL at 09:19

## 2019-09-06 RX ADMIN — PANTOPRAZOLE SODIUM 40 MG: 40 TABLET, DELAYED RELEASE ORAL at 09:28

## 2019-09-06 RX ADMIN — CLOPIDOGREL BISULFATE 75 MG: 75 TABLET ORAL at 09:19

## 2019-09-06 RX ADMIN — DOCUSATE SODIUM 100 MG: 100 CAPSULE, LIQUID FILLED ORAL at 09:19

## 2019-09-06 RX ADMIN — LABETALOL HYDROCHLORIDE 100 MG: 100 TABLET, FILM COATED ORAL at 20:21

## 2019-09-06 RX ADMIN — CIPROFLOXACIN HYDROCHLORIDE 250 MG: 250 TABLET, FILM COATED ORAL at 09:19

## 2019-09-06 RX ADMIN — CALCIUM POLYCARBOPHIL 625 MG TABLET 625 MG: at 09:19

## 2019-09-06 ASSESSMENT — PAIN SCALES - GENERAL
PAINLEVEL_OUTOF10: 1
PAINLEVEL_OUTOF10: 1
PAINLEVEL_OUTOF10: 0

## 2019-09-06 ASSESSMENT — PAIN DESCRIPTION - DESCRIPTORS: DESCRIPTORS: PATIENT UNABLE TO DESCRIBE

## 2019-09-06 ASSESSMENT — PAIN DESCRIPTION - LOCATION: LOCATION: GENERALIZED

## 2019-09-06 ASSESSMENT — PAIN DESCRIPTION - PAIN TYPE: TYPE: CHRONIC PAIN

## 2019-09-06 NOTE — PLAN OF CARE
improved sensory functioning will increase  Description  Demonstrations of improved sensory functioning will increase  Outcome: Ongoing  Goal: Decrease in sensory misperception frequency  Description  Decrease in sensory misperception frequency  Outcome: Ongoing  Goal: Able to refrain from responding to false sensory perceptions  Description  Able to refrain from responding to false sensory perceptions  Outcome: Ongoing  Goal: Demonstrates accurate environmental perceptions  Description  Demonstrates accurate environmental perceptions  Outcome: Ongoing  Goal: Able to distinguish between reality-based and nonreality-based thinking  Description  Able to distinguish between reality-based and nonreality-based thinking  Outcome: Ongoing  Goal: Able to interrupt nonreality-based thinking  Description  Able to interrupt nonreality-based thinking  Outcome: Ongoing     Problem: Sleep Pattern Disturbance:  Goal: Appears well-rested  Description  Appears well-rested  Outcome: Ongoing

## 2019-09-06 NOTE — PROGRESS NOTES
RW    Gait:    Distance:  200  feet  Assistance:  SBA  Device:  RW  Gait Quality:  Reduced step length & gait speed,     Dynamic ambulation navigating around chairs and obstacles with RW 20 ft x 2 with SBA pt requiring VC to address attention to environment     Curb       Assistance:    SBA x 2 trials  - Mod - Max VC both trials   Supportive Device:  RW  Height:   4\"      Uneven Surfaces:       Assistance:    SBA  Device:    RW  Surfaces Completed:   [x]  Green mat with bean bags beneath      []  Throw rugs             []  Outdoor pavements      []  Grass          []  Loose gravel        []  Other:         Additional Therapeutic activities/exercises completed this date:     []   Nu-step:  Time:        Level:         #Steps:       []   Rebounder:    []  Seated     []  Standing        [x]   Balance training : 10 min with seated rest breaks in between 3 min x 2 stands with standing trunk rotation reachiing outside BOY with SBA to CGA       [x]   Postural training    []   Supine ther ex (reps/sets):     []   Seated ther ex (reps/sets):     []   Standing ther ex (reps/sets):     [x]   Picked up object from floor     Assist Level:       CGA - without reacher d/t increased difficulty managing the reacher         - with the reacher:    [x]   Other: Seated donning shoes and hose, pt able to don her shoes with assist with hose   []   Other:   []   Other:     Comments:      Patient/Caregiver Education and Training:   [x]   Bed Mobility/Transfer technique/safety  [x]   Gait technique/sequencing  [x]   Proper use of assistive device  [x]   Advanced mobility safety and technique  [x]   Reinforced patient's precautions/mobility while maintaining precautions  [x]   Postural awareness  []   Family training  [x]   Progress was updated and reviewed in Rehabtracker with patient and/or family this  9/6/2019       date.   Treatment Plan for Next Session: FIM/IRF Monday, Address CGT for advanced mobility       Assessment:  Ms. [employer] activities including picking an object off the ground with SBA  Long term goal 5: Pt will ascend/descend 12 steps with CGA:        Plan of Care                                                                              Times per week: 5 days per week for a minimum of 60 minutes/day plus group as appropriate for 60 minutes.   Treatment to include Current Treatment Recommendations: Strengthening, Functional Mobility Training, Home Exercise Program, Equipment Evaluation, Education, & procurement, Manual Therapy - Soft Tissue Mobilization, Manual Therapy - Joint Manipulation, Cognitive/Perceptual Training, Gait Training, Transfer Training, ROM, Balance Training, Endurance Training, Stair training, Safety Education & Training, Modalities, Positioning, Pain Management, Patient/Caregiver Education & Training, Neuromuscular Re-education    Electronically signed by   Zoya Zafar PT, DPT #[id number]  9/6/2019, 2:52 PM

## 2019-09-06 NOTE — PROGRESS NOTES
[] Visual Deficits   [] Premorbid Conditions     [] Other      Education/Interventions used this date:  Repetition for learned tasks and word retrieval tasks    [x]   Progress was updated and reviewed in Rehabtracker with patient and/or family this         date. [] Attending Care Conference for pt this date. See Team Patient Care Conference Note for updates.     Interventions used this date:  [x] Speech/Language Treatment    [] Instruction in HEP    Group   [] Dysphagia Treatment   [x] Cognitive Skill Sukhjinder    Other:         Assessment / Impression                                                          Treatment/Activity Tolerance:   [x] Tolerated Treatment well:     [] Patient limited by fatigue/pain:       [] Patient limited by medical complications:    [] Adverse Reaction to Tx:   [] Significant change in status:      Electronically Signed by  Julien Saucedo MA, CCC-SLP    9/5/2019  10:20 PM

## 2019-09-06 NOTE — PROGRESS NOTES
Occupational Therapy  Physical Rehabilitation: OCCUPATIONAL THERAPY     [x] daily progress note       [] discharge       Patient Name:  Lukasz Galvan   :  1921 MRN: [id number]  Room:  [address] [place], [place] [address] Date of Admission: 2019  Rehabilitation Diagnosis:   Acute cerebrovascular accident (CVA) ([facility] [address] [I63.9]       Date 2019       Day of ARU Week:  1   Time IN/OUT 1050/1155   Individual Tx Minutes 65   Group Tx Minutes    Co-Treat Minutes    Concurrent Tx Minutes    TOTAL Tx Time Mins 65   Variance Time    Variance Time []   Refusal due to:     []   Medical hold/reason:    []   Illness   []   Off Unit for test/procedure  [x]   Extra time needed to complete task: toileting at end of session  []   Therapeutic need  []   Other (specify):   Restrictions Restrictions/Precautions: Fall Risk, Weight Bearing(Hx of pacemaker)         Communication with other providers: [x]   OK to see per nursing:     []   Spoke with team member regarding:      Subjective observations and cognitive status: Pt in room recliner on approach, agreeable to tx session. Doesn't engage much in conversation 2* expressive aphasia, also required cue for increased vocal volume.      Pain level/location:    0/10       Location: Denied   Discharge recommendations  Anticipated discharge date:  09/10/19  Destination: []home alone   []home alone w assist prn [] home w/ family    [] Continuous supervision       []SNF            [x] Assisted living     [] Other:   Continued therapy: [x]HHC OT  []OUTPATIENT  OT   [] No Further OT  Equipment needs: Shower chair with back     Toileting:   SBA, required cues for sequencing (began sitting before performing pants management down)      Toilet Transfers:   CGA, cue dependent for hand placement/RW management during turn  Device Used:    []   Standard Toilet         []   Grab Bars           [x]  Bedside Commode frame over toilet      []   Elevated Toilet          []   Other:        Bed Mobility:           [x]   Pt Minimal assistance(lately has been requiring additional assistance d/t increase fatigue within the past couple of months)  Toileting: Independent(Mod I )  Homemaking Assistance: Needs assistance(Pt will assist with folding laundry and drying dishes)  Homemaking Responsibilities: No(Pt does not need to complete, but she likes to help her daughter.)  Ambulation Assistance: Independent(CATALINO c cane within house)  Transfer Assistance: Independent  Active : No  Mode of Transportation: Family  Occupation: Retired  Type of occupation: Worked at [employer]: Watching game shows; [employer], Likes to read, dtr prepares meds in pill box and lays them out for pt. Family assists with finances  Additional Comments: Pt's daughter, Santino Nance, manages transportation, cooking, medication, and general supervision of pt. Pt had one fall prior to admission, with an additional occuring during her hospital stay. Visits family, plays cards, out to eat, grocery. Objective                                                                                    Goals:  (Update in navigator)  Short term goals  Time Frame for Short term goals: 7 Days  Short term goal 1: Pt will complete supervision feeding   Short term goal 2: Pt will complete supervision grooming   Short term goal 3: Pt will complete CGA bathing   Short term goal 4: Pt will complete Min A UB dressing   Short term goal 5: Pt will complete Min A LB dressing   Short term goal 6: Pt will complete SBA toileting   Short term goal 7: Pt will complete SBA transfers (chair, bed, shower):   Long term goals  Time Frame for Long term goals : Until met or pt is discharged  Long term goal 1: Pt will complete Mod I feeding with adaptive equipment   Long term goal 2: Pt will complete independent grooming   Long term goal 3: Pt will complete supervision bathing   Long term goal 4: Pt will complete supervision UB dressing   Long term goal 5: Pt will complete supervision LB

## 2019-09-07 PROCEDURE — 1280000000 HC REHAB R&B

## 2019-09-07 PROCEDURE — 97116 GAIT TRAINING THERAPY: CPT

## 2019-09-07 PROCEDURE — 6370000000 HC RX 637 (ALT 250 FOR IP): Performed by: PHYSICAL MEDICINE & REHABILITATION

## 2019-09-07 PROCEDURE — 6370000000 HC RX 637 (ALT 250 FOR IP): Performed by: HOSPITALIST

## 2019-09-07 PROCEDURE — 6360000002 HC RX W HCPCS: Performed by: PHYSICAL MEDICINE & REHABILITATION

## 2019-09-07 RX ORDER — SIMETHICONE 80 MG
80 TABLET,CHEWABLE ORAL 4 TIMES DAILY PRN
Status: DISCONTINUED | OUTPATIENT
Start: 2019-09-07 | End: 2019-09-10 | Stop reason: HOSPADM

## 2019-09-07 RX ADMIN — SIMETHICONE CHEW TAB 80 MG 80 MG: 80 TABLET ORAL at 20:20

## 2019-09-07 RX ADMIN — CLOPIDOGREL BISULFATE 75 MG: 75 TABLET ORAL at 08:58

## 2019-09-07 RX ADMIN — POLYETHYLENE GLYCOL (3350) 17 G: 17 POWDER, FOR SOLUTION ORAL at 08:59

## 2019-09-07 RX ADMIN — LABETALOL HYDROCHLORIDE 100 MG: 100 TABLET, FILM COATED ORAL at 20:17

## 2019-09-07 RX ADMIN — DOCUSATE SODIUM 100 MG: 100 CAPSULE, LIQUID FILLED ORAL at 08:59

## 2019-09-07 RX ADMIN — MEMANTINE HYDROCHLORIDE 5 MG: 5 TABLET ORAL at 17:06

## 2019-09-07 RX ADMIN — LABETALOL HYDROCHLORIDE 100 MG: 100 TABLET, FILM COATED ORAL at 08:59

## 2019-09-07 RX ADMIN — ASPIRIN 81 MG 81 MG: 81 TABLET ORAL at 08:59

## 2019-09-07 RX ADMIN — PANTOPRAZOLE SODIUM 40 MG: 40 TABLET, DELAYED RELEASE ORAL at 06:20

## 2019-09-07 RX ADMIN — ACETAMINOPHEN 650 MG: 325 TABLET ORAL at 13:24

## 2019-09-07 RX ADMIN — CALCIUM CARBONATE 500 MG: 500 TABLET, CHEWABLE ORAL at 20:20

## 2019-09-07 RX ADMIN — ENOXAPARIN SODIUM 30 MG: 30 INJECTION SUBCUTANEOUS at 08:59

## 2019-09-07 RX ADMIN — CALCIUM CARBONATE 500 MG: 500 TABLET, CHEWABLE ORAL at 12:17

## 2019-09-07 RX ADMIN — PRAVASTATIN SODIUM 10 MG: 10 TABLET ORAL at 20:18

## 2019-09-07 RX ADMIN — ACETAMINOPHEN 650 MG: 325 TABLET ORAL at 20:20

## 2019-09-07 RX ADMIN — CIPROFLOXACIN HYDROCHLORIDE 250 MG: 250 TABLET, FILM COATED ORAL at 06:20

## 2019-09-07 RX ADMIN — CALCIUM POLYCARBOPHIL 625 MG TABLET 625 MG: at 08:59

## 2019-09-07 RX ADMIN — LISINOPRIL 20 MG: 20 TABLET ORAL at 08:59

## 2019-09-07 RX ADMIN — HYDROCHLOROTHIAZIDE 25 MG: 25 TABLET ORAL at 08:58

## 2019-09-07 RX ADMIN — SIMETHICONE CHEW TAB 80 MG 80 MG: 80 TABLET ORAL at 14:13

## 2019-09-07 RX ADMIN — POTASSIUM CHLORIDE 20 MEQ: 20 TABLET, EXTENDED RELEASE ORAL at 08:58

## 2019-09-07 RX ADMIN — DOCUSATE SODIUM 100 MG: 100 CAPSULE, LIQUID FILLED ORAL at 20:19

## 2019-09-07 ASSESSMENT — PAIN SCALES - GENERAL
PAINLEVEL_OUTOF10: 0
PAINLEVEL_OUTOF10: 5

## 2019-09-07 NOTE — PROGRESS NOTES
[facility] ARU Physical Therapy     [x] daily progress note       [] discharge       Patient Name:  Bean Barry   :  1921 MRN: [id number]  Room:  [address] [place] [address] Date of Admission: 2019  Rehabilitation Diagnosis:   Acute cerebrovascular accident (CVA) ([facility] [address] [I63.9]        Date 2019       Day of ARU Week:  2   Time IN/OUT 0930/0950; Individual Tx Minutes Timed Code Treatment Minutes: 20 Minutes   TOTAL Tx Time Mins  Timed Code Treatment Minutes: 20 Minutes   Variance Time  Due to: + 21 Min to address CGT for family, regarding safety with guarding and appropriate VC throughout advanced ambulation    Restrictions Restrictions/Precautions  Restrictions/Precautions: Fall Risk, Weight Bearing(Hx of pacemaker)      Communication with other providers: [x]   OK to see per nursing:     []   Spoke with team member regarding:      Subjective observations and cognitive status:   Bean Barry was seated up in the bedside chair on PT arrival. she was agreeable to PT treatment session. With son present, agreeable to participate in CGT early, in order to prevent pt fatigue throughout PT/OT session throughout [facility] treatment session. Pain level/location:           Location:     Discharge recommendations  Destination: Home with Home health PT, 24 hour supervision or assist(9/10)   Equipment needs: Other: Bean Barry requires the assistance of a wheeled walker to successfully ambulate from room to room at home to allow completion of daily living tasks such as: bathing, toileting, dressing and grooming. A wheeled walker is necessary due to the patient's unsteady gait, upper body weakness, inability to  a standard walker. This patient can ambulate only by pushing a walker instead of using a lesser assistive device such as a cane or crutch.        Bed Mobility:  [x]   Pt received out of bed        Transfers:    Sit--> Stand:  SBA   Stand --> Sit:   SBA to CGA  Stand-Pivot:   SBA  Car Transfers:  SBA to CGA  Other:    Assistive device required for transfer:   RW    Gait:    Distance:  12 feet  Assistance:  SBA to CGA - with environmental VC  Device:  RW  Gait Quality:  Reduced step length B, with increased distance between BOY and FWW     Curb     X 1 trial  Assistance:    SBA to CGA  Supportive Device:  RW  Height:   4\"    Uneven Surfaces:       Assistance:    CGA  Device:    RW  Surfaces Completed:   []  Green mat with bean bags beneath      [x]  Throw rugs             []  Outdoor pavements      []  Grass          []  Loose gravel        []  Other:         Additional Therapeutic activities/exercises completed this date:     []   Nu-step:  Time:        Level:         #Steps:       []   Rebounder:    []  Seated     []  Standing        []   Balance training :       []   Postural training    []   Supine ther ex (reps/sets):     []   Seated ther ex (reps/sets):     []   Standing ther ex (reps/sets):     []   Picked up object from floor     Assist Level:                     []   Reacher used   []   Other:   []   Other:   []   Other:     Comments:      Patient/Caregiver Education and Training:   [x]   Bed Mobility/Transfer technique/safety  [x]   Gait technique/sequencing  [x]   Proper use of assistive device  [x]   Advanced mobility safety and technique  [x]   Reinforced patient's precautions/mobility while maintaining precautions  []   Postural awareness  [x]   Family training - Pt and family educated regarding PT/OT recommendation for 24 hr supervision & assist in order to ensure safety and prevent falls upon discharge. Son demonstrated the ability to safely don a gait belt, guard throughout advanced mobility, and cue pt for appropriate safety mechanics throughout ambulation over uneven surfaces, a curb step, and car transfers. PT provided initial demonstration and son was able to demonstrate carryover with pt throughout mobility.  Reinforced with family regarding providing guarding throughout performing stairs, with

## 2019-09-07 NOTE — PROGRESS NOTES
Temo Mckenna    : 1921  Acct #: [de-identified]  MRN: [id number]              PM&R Progress Note      Admitting diagnosis: Ischemic stroke with infarction.     Comorbid diagnoses impacting rehabilitation: Right hemiparesis, dysarthria, dysphagia, dementia with cognitive impairment, acute kidney injury, hypertension, obstipation     Chief complaint: Tired but wanting to work in therapy today. Prior (baseline) level of function: Independent. Current level of function:     Physical therapy:  Bed Mobility: Scooting: Contact guard assistance  Transfers: Sit to Stand: Moderate Assistance, Maximum Assistance  Stand to sit: Minimal Assistance  Bed to Chair: Moderate assistance(Increased steadying required due to fatigue), Ambulation 1  Surface: level tile  Device: Rolling Walker  Assistance: Minimal assistance  Quality of Gait: forefoot at initial contact with LLE, FWD bent posture, increased unsteadiness with fatigue and resp rate; reduced step length with shuffling gait pattern   Distance: 10 ft (Min A for steadying) + 40 ft (max effort, with Min A) , Stairs  # Steps : 5  Stairs Height: (4/6)  Rails: Bilateral(Min A ascending, Mod A descending with pt using TC input in order to clear step)  Curbs: 4\"(FWW, Min A)  Mobility: Bed, Chair, Wheel Chair: 4 - Requires steadying assistance only <25% assist  and/or requires assist with one leg only  Walk: 5 - Supervision Requires standby supervision or cuing to walk at least 150 feet  Distance Walked: 170'  Stairs: 2- Maximal Assistance Performs 25-49% of the effort to go up and down 4 to 6 stairs and requires the assistance of one person only, PT Equipment Recommendations  Other: Temo Mckenna requires the assistance of a wheeled walker to successfully ambulate from room to room at home to allow completion of daily living tasks such as: bathing, toileting, dressing and grooming.   A wheeled walker is necessary due to the patient's unsteady gait, upper body weakness, inability to  a standard walker. This patient can ambulate only by pushing a walker instead of using a lesser assistive device such as a cane or crutch. ,      Occupational therapy: Eatin - Feeds self with setup/supervision/cues and/or requires only setup/supervision/cues to perform tube feedings  Groomin - Requires setup/cues to do all tasks  Bathin - Able to bathe 8-9 areas  Dressing-Upper: 5 - Requires setup/supervision/cues and/or requires assist with presthesis/brace only  Dressing-Lower: 3 - Requires assist with 2-3 parts of dressing  Toileting: 3 - Able to perform 2 tasks  Toilet Transfer: 4 - Requires steadying assistance only < 25% assist  Tub Transfer: 0 - Activity does not occur  Shower Transfer: 4 - Minimal contact assistance, pt. expends 75% or more effort,  ,      Speech therapy:  Comprehension: 4 - Patient understands basic needs 75-90%+ of the time  Expression: 5 - Expresses basic ideas/needs only (hungry/hot/pain)  Social Interaction: 5 - Patient is appropriate with supervision/cues  Problem Solving: 3 - Patient solves simple/routine tasks 50%-74%  Memory: 3 - Patient remembers 50%-74% of the time    Exam:    Blood pressure (!) 141/66, pulse 68, temperature 97.5 °F (36.4 °C), resp. rate 18, height 5' 7\" (1.702 m), weight 138 lb 9.6 oz (62.9 kg), SpO2 95 %. General: Follows one-step commands. Slow and deliberate with movements. Poor attention. HEENT: Visual fields are full but she is still less attentive to her right side. Mucous membranes moist.    Pulmonary: Chest is clear. Cardiac: RRR. Abdomen: Patient's abdomen is soft and nondistended. Bowel sounds were present throughout. There was no rebound, guarding or masses noted. Upper extremities: No new bruising or signs of instability. No tremor. Weak  on the right. Lower extremities: Widened base of support with standing. No signs of DVT. Sitting balance was fair+.   Standing balance was

## 2019-09-07 NOTE — PROGRESS NOTES
Darrel Krabbe    : 1921  Acct #: [de-identified]  MRN: [id number]              PM&R Progress Note      Admitting diagnosis: Ischemic stroke with infarction.     Comorbid diagnoses impacting rehabilitation: Right hemiparesis, dysarthria, dysphagia, dementia with cognitive impairment, acute kidney injury, hypertension, obstipation    Chief complaint: Nausea. May be heartburn. Prior (baseline) level of function: Independent. Current level of function:     Physical therapy:  Bed Mobility: Scooting: Contact guard assistance  Transfers: Sit to Stand: Moderate Assistance, Maximum Assistance  Stand to sit: Minimal Assistance  Bed to Chair: Moderate assistance(Increased steadying required due to fatigue), Ambulation 1  Surface: level tile  Device: Rolling Walker  Assistance: Minimal assistance  Quality of Gait: forefoot at initial contact with LLE, FWD bent posture, increased unsteadiness with fatigue and resp rate; reduced step length with shuffling gait pattern   Distance: 10 ft (Min A for steadying) + 40 ft (max effort, with Min A) , Stairs  # Steps : 5  Stairs Height: (4/6)  Rails: Bilateral(Min A ascending, Mod A descending with pt using TC input in order to clear step)  Curbs: 4\"(FWW, Min A)  Mobility: Bed, Chair, Wheel Chair: 4 - Requires steadying assistance only <25% assist  and/or requires assist with one leg only  Walk: 5 - Supervision Requires standby supervision or cuing to walk at least 150 feet  Distance Walked: 170'  Stairs: 2- Maximal Assistance Performs 25-49% of the effort to go up and down 4 to 6 stairs and requires the assistance of one person only, PT Equipment Recommendations  Other: Darrel Krabbe requires the assistance of a wheeled walker to successfully ambulate from room to room at home to allow completion of daily living tasks such as: bathing, toileting, dressing and grooming.   A wheeled walker is necessary due to the patient's unsteady gait, upper body weakness, inability to  a  09/05/2019     Lab Results   Component Value Date    INR 1.00 08/28/2019    INR 0.97 02/10/2015    PROTIME 11.6 08/28/2019    PROTIME 11.1 02/10/2015     Lab Results   Component Value Date    CREATININE 1.2 (H) 09/05/2019    BUN 36 (H) 09/05/2019     09/05/2019    K 4.4 09/05/2019    CL 99 09/05/2019    CO2 25 09/05/2019     Lab Results   Component Value Date    ALT 21 09/05/2019    AST 23 09/05/2019    ALKPHOS 115 09/05/2019    BILITOT 0.2 09/05/2019       Expected length of stay  prior to a supervised level of function for discharge home with a walker and [address] OT/PT is 10/20/2019. Recommendations:    1. Ischemic stroke with infarction: Recently, better tolerance for her daily occupational and physical therapy with speech-language pathology. Cristhian Mack modified diet and attention to swallowing in speech therapy. Fran Drivers for the antiplatelet therapy (baby aspirin and Plavix).  GI prophylaxis offered.  Must attend to her elevated blood pressure, obstipation and nutritional support.    Pulmonary hygiene and DVT prophylaxis emphasized.  Some right inattention and morning lethargy interferes with therapy. She does better with an elevated SBP. 2. DVT prophylaxis: Lovenox 30 mg subcu daily.  I must monitor her hemoglobin and platelet count while on this medication.  Weightbearing activities are slowly increasing.  GI prophylaxis tolerated.  Struggling with left neglect. No s/s of acute blood loss. 3. Uncontrolled hypertension: Patient requires labetalol, hydrochlorothiazide and lisinopril.  Target systolic blood pressure is 140-150. .  Consistent oral intake encouraged.  Vital signs are checked at before meals and at bedtime.   Wanting to avoid drops in blood pressure or dizziness which aggressive HTN mgmt has worsened recently. 4. Acute kidney injury:  Minimize blood pressure fluctuations, provide oral hydration and monitor renal function closely.  Avoiding nephrotoxic medications.  Adjusted Lovenox for renal dosing.  Encouragement to maximize oral intake. 5. Dementia: Seems to have worsened significantly recently at home PTA.  Neurology has started Namenda. Monitoring her response to this medication.  Treating her in a calm and consistent environment to help retention.    Trying to establish regular sleep-wake cycles.  Caregiver training will be particularly important through this stay.   6. Obstipation: Continue with oral medications including MiraLAX.  She is on a stool softener.  Encouraging regular oral intake, physical activity and hydration.

## 2019-09-08 PROCEDURE — 6370000000 HC RX 637 (ALT 250 FOR IP): Performed by: HOSPITALIST

## 2019-09-08 PROCEDURE — 94761 N-INVAS EAR/PLS OXIMETRY MLT: CPT

## 2019-09-08 PROCEDURE — 6370000000 HC RX 637 (ALT 250 FOR IP): Performed by: PHYSICAL MEDICINE & REHABILITATION

## 2019-09-08 PROCEDURE — 6360000002 HC RX W HCPCS: Performed by: PHYSICAL MEDICINE & REHABILITATION

## 2019-09-08 PROCEDURE — 1280000000 HC REHAB R&B

## 2019-09-08 PROCEDURE — 94150 VITAL CAPACITY TEST: CPT

## 2019-09-08 RX ADMIN — PRAVASTATIN SODIUM 10 MG: 10 TABLET ORAL at 20:38

## 2019-09-08 RX ADMIN — ASPIRIN 81 MG 81 MG: 81 TABLET ORAL at 09:55

## 2019-09-08 RX ADMIN — LISINOPRIL 20 MG: 20 TABLET ORAL at 09:55

## 2019-09-08 RX ADMIN — LABETALOL HYDROCHLORIDE 100 MG: 100 TABLET, FILM COATED ORAL at 09:56

## 2019-09-08 RX ADMIN — MEMANTINE HYDROCHLORIDE 5 MG: 5 TABLET ORAL at 17:28

## 2019-09-08 RX ADMIN — DOCUSATE SODIUM 100 MG: 100 CAPSULE, LIQUID FILLED ORAL at 20:38

## 2019-09-08 RX ADMIN — ALUMINUM HYDROXIDE, MAGNESIUM HYDROXIDE, AND SIMETHICONE 30 ML: 200; 200; 20 SUSPENSION ORAL at 17:29

## 2019-09-08 RX ADMIN — POLYETHYLENE GLYCOL (3350) 17 G: 17 POWDER, FOR SOLUTION ORAL at 09:49

## 2019-09-08 RX ADMIN — CALCIUM POLYCARBOPHIL 625 MG TABLET 625 MG: at 09:56

## 2019-09-08 RX ADMIN — HYDROCHLOROTHIAZIDE 25 MG: 25 TABLET ORAL at 09:56

## 2019-09-08 RX ADMIN — SIMETHICONE CHEW TAB 80 MG 80 MG: 80 TABLET ORAL at 14:09

## 2019-09-08 RX ADMIN — ENOXAPARIN SODIUM 30 MG: 30 INJECTION SUBCUTANEOUS at 09:56

## 2019-09-08 RX ADMIN — PANTOPRAZOLE SODIUM 40 MG: 40 TABLET, DELAYED RELEASE ORAL at 06:37

## 2019-09-08 RX ADMIN — POTASSIUM CHLORIDE 20 MEQ: 20 TABLET, EXTENDED RELEASE ORAL at 09:55

## 2019-09-08 RX ADMIN — DOCUSATE SODIUM 100 MG: 100 CAPSULE, LIQUID FILLED ORAL at 09:55

## 2019-09-08 RX ADMIN — CLOPIDOGREL BISULFATE 75 MG: 75 TABLET ORAL at 09:55

## 2019-09-08 RX ADMIN — LABETALOL HYDROCHLORIDE 100 MG: 100 TABLET, FILM COATED ORAL at 20:38

## 2019-09-08 RX ADMIN — ACETAMINOPHEN 650 MG: 325 TABLET ORAL at 14:15

## 2019-09-08 ASSESSMENT — PAIN SCALES - GENERAL
PAINLEVEL_OUTOF10: 1
PAINLEVEL_OUTOF10: 0

## 2019-09-09 PROCEDURE — 97530 THERAPEUTIC ACTIVITIES: CPT

## 2019-09-09 PROCEDURE — 94761 N-INVAS EAR/PLS OXIMETRY MLT: CPT

## 2019-09-09 PROCEDURE — 97116 GAIT TRAINING THERAPY: CPT

## 2019-09-09 PROCEDURE — 6370000000 HC RX 637 (ALT 250 FOR IP): Performed by: HOSPITALIST

## 2019-09-09 PROCEDURE — 94150 VITAL CAPACITY TEST: CPT

## 2019-09-09 PROCEDURE — 6360000002 HC RX W HCPCS: Performed by: PHYSICAL MEDICINE & REHABILITATION

## 2019-09-09 PROCEDURE — 6370000000 HC RX 637 (ALT 250 FOR IP): Performed by: PHYSICAL MEDICINE & REHABILITATION

## 2019-09-09 PROCEDURE — 92507 TX SP LANG VOICE COMM INDIV: CPT

## 2019-09-09 PROCEDURE — 1280000000 HC REHAB R&B

## 2019-09-09 PROCEDURE — 99232 SBSQ HOSP IP/OBS MODERATE 35: CPT | Performed by: PHYSICAL MEDICINE & REHABILITATION

## 2019-09-09 PROCEDURE — 97535 SELF CARE MNGMENT TRAINING: CPT

## 2019-09-09 RX ADMIN — PANTOPRAZOLE SODIUM 40 MG: 40 TABLET, DELAYED RELEASE ORAL at 06:42

## 2019-09-09 RX ADMIN — LABETALOL HYDROCHLORIDE 100 MG: 100 TABLET, FILM COATED ORAL at 09:35

## 2019-09-09 RX ADMIN — LABETALOL HYDROCHLORIDE 100 MG: 100 TABLET, FILM COATED ORAL at 20:02

## 2019-09-09 RX ADMIN — POTASSIUM CHLORIDE 20 MEQ: 20 TABLET, EXTENDED RELEASE ORAL at 09:35

## 2019-09-09 RX ADMIN — MEMANTINE HYDROCHLORIDE 5 MG: 5 TABLET ORAL at 17:15

## 2019-09-09 RX ADMIN — DOCUSATE SODIUM 100 MG: 100 CAPSULE, LIQUID FILLED ORAL at 09:35

## 2019-09-09 RX ADMIN — ACETAMINOPHEN 650 MG: 325 TABLET ORAL at 09:35

## 2019-09-09 RX ADMIN — ENOXAPARIN SODIUM 30 MG: 30 INJECTION SUBCUTANEOUS at 09:36

## 2019-09-09 RX ADMIN — DOCUSATE SODIUM 100 MG: 100 CAPSULE, LIQUID FILLED ORAL at 20:02

## 2019-09-09 RX ADMIN — HYDROCHLOROTHIAZIDE 25 MG: 25 TABLET ORAL at 09:35

## 2019-09-09 RX ADMIN — CLOPIDOGREL BISULFATE 75 MG: 75 TABLET ORAL at 09:35

## 2019-09-09 RX ADMIN — PRAVASTATIN SODIUM 10 MG: 10 TABLET ORAL at 20:02

## 2019-09-09 RX ADMIN — LISINOPRIL 20 MG: 20 TABLET ORAL at 09:36

## 2019-09-09 RX ADMIN — ASPIRIN 81 MG 81 MG: 81 TABLET ORAL at 09:35

## 2019-09-09 RX ADMIN — CALCIUM POLYCARBOPHIL 625 MG TABLET 625 MG: at 09:36

## 2019-09-09 RX ADMIN — ALUMINUM HYDROXIDE, MAGNESIUM HYDROXIDE, AND SIMETHICONE 30 ML: 200; 200; 20 SUSPENSION ORAL at 17:56

## 2019-09-09 RX ADMIN — POLYETHYLENE GLYCOL (3350) 17 G: 17 POWDER, FOR SOLUTION ORAL at 09:35

## 2019-09-09 ASSESSMENT — PAIN SCALES - GENERAL: PAINLEVEL_OUTOF10: 3

## 2019-09-09 NOTE — PROGRESS NOTES
Assistance: Independent  Active : No  Mode of Transportation: Family  Occupation: Retired  Type of occupation: Worked at [employer]: Watching game shows; [employer], Likes to read, dtr prepares meds in pill box and lays them out for pt. Family assists with finances  Additional Comments: Pt's daughter, Javier Sender, manages transportation, cooking, medication, and general supervision of pt. Pt had one fall prior to admission, with an additional occuring during her hospital stay. Visits family, plays cards, out to eat, grocery.     Objective                                                                                    Goals:  (Update in navigator)  Short term goals  Time Frame for Short term goals: 7 days  Short term goal 1: Pt will perform bed mobility with Min A, demonstrating improved sequencing and coordination   Short term goal 2: Pt will perform sit/stand transfers with Min A up to a FWW, demonstrating improved upright posture   Short term goal 3: Pt will ambulate 100 ft, with a FWW CGA demonstrating improved overall endurance and activity tolerance   Short term goal 4: Pt will perform advanced ambulation, including ascending/descending a curb step and navigating uneven surfaces with Min A   Short term goal 5: Pt will ascend/descend 5 steps with CGA and bilateral handrails :  Long term goals  Time Frame for Long term goals : 7-12 days  Long term goal 1: Pt will be SBA with bed mobility, with the exception of rolling   Long term goal 2: Pt will perform OOB transfers and car transfer with SBA, using LRAD   Long term goal 3: Pt will ambulate 150 ft with LRAD, demonstrating improved endurance and safety awareness throughout functional tasks   Long term goal 4: Pt will perform advanced ambulation, including ascending a curb step, navigating uneven surfaces and advanced standing balance activities including picking an object off the ground with SBA  Long term goal 5: Pt will ascend/descend 12

## 2019-09-09 NOTE — PROGRESS NOTES
Occupational Therapy  Physical Rehabilitation: OCCUPATIONAL THERAPY     [x] daily progress note       [] discharge       Patient Name:  Guilford Opitz   :  1921 MRN: [id number]  Room:  [address] [place] [address] Date of Admission: 2019  Rehabilitation Diagnosis:   Acute cerebrovascular accident (CVA) ([facility] [address] [I63.9]       Date 2019       Day of ARU Week:  4   Time IN//1100   Individual Tx Minutes 61   Group Tx Minutes    Co-Treat Minutes    Concurrent Tx Minutes    TOTAL Tx Time Mins 63   Variance Time    Variance Time []   Refusal due to:     []   Medical hold/reason:    []   Illness   []   Off Unit for test/procedure  []   Extra time needed to complete task  []   Therapeutic need  []   Other (specify):   Restrictions Restrictions/Precautions  Restrictions/Precautions: Fall Risk, Weight Bearing(Hx of pacemaker)      Communication with other providers: [x]   OK to see per nursing:     []   Spoke with team member regarding:      Subjective observations and cognitive status: Pt in recliner on approach, pleasant and agreeable to tx session. Required repeated rest breaks 2* fatigue. Pain level/location:    /10       Location: Denied   Discharge recommendations  Anticipated discharge date:  09/10  Destination: []home alone   []home alone w assist prn [] home w/ family    [x] Continuous supervision       []SNF            [] Assisted living     [] Other:   Continued therapy: [x]HHC OT  []OUTPATIENT  OT   [] No Further OT  Equipment needs: Shower chair with back    (HIT F2 to transition between stars)    Eating/Feeding:     Supervision; requires cues to initiate/sustain attention to task to eat adequate amount  Extremity Used:        [x]    R UE []    L UE         Dentures: [x]   N/A    []    Partial []    Full  Adaptive Equipment Used:        Grooming:   Setup seated   Oral Hygiene:  Setup seated      Bathing:  Min A to thoroughly wash bottom after pt performed best attempt; used LHS for distal BLEs.   Significant Rebounder:    []  Seated     []  Standing        []   Supine Ther Ex (reps/sets):     []   Seated Ther Ex (reps/sets):     []   Standing Ther Ex (reps/sets):     []   Other:      Comments: All intervention performed to increase pt's endurance, ax tolerance, balance, and I c ADLs/IADLs and functional transfers/mobility. Patient/Caregiver Education and Training:   [x]   Adaptive Equipment Use  [x]   Bed Mobility/Transfer Technique/Safety  []   Energy Conservation Tips  []   Family training  [x]   Postural Awareness  [x]   Safety During Functional Activities  []   Reinforced Patient's Precautions   []   Progress was updated and reviewed in Rehabtracker with patient and/or family this         date. Treatment Plan for Next Session: Planned d/c from ARU next date. Assessment:  Pt was not able to effectively use reacher during ADLs today d/t decreased coordination and problem solving; RLE weakness also limits ADL performance.        Treatment/Activity Tolerance:   [x] Tolerated treatment with no adverse effects    [] Patient limited by fatigue  [] Patient limited by pain   [] Patient limited by medical complications:    [] Adverse reaction to Tx:   [] Significant change in status    Safety:       []  bed alarm set    [x]  chair alarm set    []  Pt refused alarms                [x]  Telesitter activated      [x]  Gait belt used during tx session      []other:       Number of Minutes/Billable Intervention  Therapeutic Exercise    ADL Self-care 63   Neuro Re-Ed    Therapeutic Activity    Group    Other:    TOTAL 63       Social History  Social/Functional History  Lives With: Daughter(dtr lives with pt in pt's house)  Type of Home: House  Home Layout: One level  Home Access: Stairs to enter with rails  Entrance Stairs - Number of Steps: 1 step with L. grab bar  Bathroom Shower/Tub: Walk-in shower, Shower chair without back(4 inch ledge to step over to access walk-in)  Bathroom Toilet: Handicap height(with BS

## 2019-09-09 NOTE — PROGRESS NOTES
and general supervision of pt. Pt had one fall prior to admission, with an additional occuring during her hospital stay. Visits family, plays cards, out to eat, grocery. POC: Pt will be seen 5x/week for a minimum of 30 minutes per day. Co-treats where appropriate with PT or OT to facilitate patient goals in functional tasks. Date of Admit: 8/23/2019  Room #: 1028/1028-A     ST Dx:   []   Cognition/  Memory Deficits  []   Dysphagia  []  Dysarthria  []  Apraxia  [x]   Aphasia  []   Other   # billable minutes per area     45      Date: 9/9/2019  Day of ARU Week:  4       SLP Individual Minutes  Time In: 9990  Time Out: 2358  Minutes: 45     Variance/Reason:  [] Refusal due to   [] Medical hold/reason  [] Illness   [] Off Unit for test/procedure  [] Extra time needed to complete task  [] Other (specify)    Activity completed: Pt seen for language this date. Ready to discharge tomorrow to [place]. Pt would benefit from continued ST for language retrieval as well as participation in social activities to continue to stimulate language, attn, and focus. Pain: denies  Current Diet: Dietary Nutrition Supplements: Frozen Oral Supplement  DIET GENERAL;  Subjective: Pt seen following OT session. Pt alert and responsive with increased word retrieval delays this date with more active cues. Goals and POC:  LTG    Timeframe for Long-term Goals: No swallow tx recommended. Short-term Goals  Timeframe for Short-term Goals: 5x/week x2 weeks 30 mins min  LTG: Improve expressive communication in conversation to FIM 5. Partially met, FIM 2  Goal 1: Ongoing assessment of cognition and memory. 9/6  Discontinue goal  Goal 2: Pt will use strategies to improve word retrieval in structured tasks with mod cues and 80% acc.  Divergent naming tasks avg 3 out of requested 5 for concrete items in a minute with leading questions and moderate cue--phonemic cues (furniture, animals, clothing, fruits)  Convergent naming updates.     Interventions used this date:  [x] Speech/Language Treatment    [] Instruction in HEP    Group   [] Dysphagia Treatment   [] Cognitive Skill Sukhjinder    Other:         Assessment / Impression                                                          Treatment/Activity Tolerance:   [x] Tolerated Treatment well:     [] Patient limited by fatigue/pain:       [] Patient limited by medical complications:    [] Adverse Reaction to Tx:   [] Significant change in status:      Electronically Signed by  Maria C Ventura MA, [id number] [facility]    9/9/2019  12:14 PM

## 2019-09-09 NOTE — CONSULTS
Department of Internal Medicine  Gastroenterology Consult Note  Jonatan Yanes. Mikel LOPEZ      Reason for Consult:  dyspepsia    Primary Care Physician:  Bernabe Webb MD    History Obtained From:  patient, family member - son    HISTORY OF PRESENT ILLNESS:              The patient is a 80 y.o.  female known to me with a history of constipation and prior fecal impactions. In addition she has had GERD that prevbiously was controlled with a PPI but apparently has been off that recently until her adfmission. She is in rehab now following an admission for a CVA. She has complained of some indigestion and intermittent abd pain. She has been having bowel movements and had 2 soft, non-bloody stools last night. This morning she has no specific complaints    Past Medical History:        Diagnosis Date    Arthritis     Cerebral artery occlusion with cerebral infarction ([address]     Hyperlipidemia     Hypertension     Impaction of the bowels ([address]     Pacemaker 6/9/16    Medtronic Dual chamber implanted per Carolina Roberts for Asystole for 6 seconds (SSS & intermittent high grade AVB).  SSS (sick sinus syndrome) ([address] 6/16       Past Surgical History:        Procedure Laterality Date    APPENDECTOMY      BACK SURGERY      denervation x2    COLONOSCOPY      EYE SURGERY Bilateral     cataract removal    HIP SURGERY      JOINT REPLACEMENT      R hip, R knee replacement    KNEE SURGERY      PACEMAKER INSERTION  6/9/16    Medtronic Dual-Chamber pacemaker per Carolina Roberts.  PACEMAKER PLACEMENT      TONSILLECTOMY         Medications Prior to Admission:    Prior to Admission medications    Medication Sig Start Date End Date Taking?  Authorizing Provider   simvastatin (ZOCOR) 10 MG tablet Take 10 mg by mouth nightly   Yes Historical Provider, MD   aspirin 81 MG tablet Take 81 mg by mouth daily   Yes Historical Provider, MD   labetalol (NORMODYNE) 100 MG tablet Take 100 mg by mouth 2 times daily   Yes Historical Provider, MD polyethylene glycol (MIRALAX) powder Take 17 g by mouth daily    Yes Historical Provider, MD   hydrochlorothiazide (MICROZIDE) 12.5 MG capsule Take 12.5 mg by mouth daily   Yes Historical Provider, MD   lisinopril (PRINIVIL;ZESTRIL) 10 MG tablet Take 1 tablet by mouth daily 7/24/18  Yes Oliver Toro MD   Multiple Vitamins-Minerals (ICAPS AREDS 2 PO) Take 2 capsules by mouth daily    Yes Historical Provider, MD   docusate sodium (COLACE) 100 MG capsule Take 1 capsule by mouth 2 times daily. 2/10/15  Yes Bina Cabello MD   MULTIPLE VITAMIN PO Take 1 tablet by mouth daily    Yes Historical Provider, MD   pravastatin (PRAVACHOL) 10 MG tablet Take 10 mg by mouth nightly    Historical Provider, MD       Allergies:  No Known Allergies. Social History:    TOBACCO:  No  ETOH:  No    Family History:   History reviewed. No pertinent family history. REVIEW OF SYSTEMS: (POSITIVES WILL BE UNDERLINED)  CONSTITUTIONAL:    Weight change,fatigue, fever, chills  EYES:  Diplopia, change in vision  EARS:  hearing loss, tinnitus, vertigo  NOSE:   epistaxis  MOUTH/THROAT:     hoarseness, sore throat. RESPIRATORY:  SOB,  cough, sputum, hemoptysis  CARDIOVASCULAR : chest pain,palpitations, dyspnea exertion, orthopnea, paroxysmal nocturnal dyspnea, pedal edema. GASTROINTESTINAL:  See HPI  GENITOURINARY:   dysuria, hematuria, . HEMATOLOGIC/LYMPHATIC:   Anemia, bleeding tendencies.   MUSCULOSKELETAL:    myalgias,  joint pain  NEUROLOGICAL:   Loss of Consciousness, paresthesias, anesthesias, focal weakness  SKIN :   History of dermatitis, rashes  PSYCHIATRIC:  depression, , anxiety past psychosis  ENDOCRINE:  History of diabetes, thyroid disease  ALL/IMM : allergies, rashes    PHYSICAL EXAM:    Vitals:  BP (!) 147/78   Pulse 77   Temp 98 °F (36.7 °C)   Resp 18   Ht 5' 7\" (1.702 m)   Wt 138 lb 9.6 oz (62.9 kg)   SpO2 99%   BMI 21.71 kg/m²   CONSTITUTIONAL: alert, cooperative, no apparent distress,   EYES:  pupils equal, round and reactive to light and sclera clear  ENT:  normocepalic, without obvious abnormality  NECK:  supple, symmetrical, trachea midline  HEMATOLOGIC/LYMPHATICS:  no cervical lymphadenopathy and no supraclavicular lymphadenopathy  LUNGS:  clear to auscultation  CARDIOVASCULAR:  regular rate and rhythm and no murmur noted  ABDOMEN:  Soft, non-tender with normal bowel sounds. No organomegaly or masses  NEUROLOGIC: no focal deficit detected  SKIN:  no lesions  EXTREMITIES: no clubbing, cyanosis, or edema    IMPRESSION:  1) history of constipation and GERD  2) non-specific GI symptoms- mainly dyspepsia    RECOMMENDATIONS:  1) due to her advanced age and recent CVA I do not recommend any invasive intervention at this time  2) keep on PPI indefinitely  3) keep bowels moving  4) let me know if I need to see her again        [facility].  Catalina Vela M.D

## 2019-09-10 ENCOUNTER — CARE COORDINATION (OUTPATIENT)
Dept: CASE MANAGEMENT | Age: 84
End: 2019-09-10

## 2019-09-10 VITALS
WEIGHT: 135 LBS | RESPIRATION RATE: 16 BRPM | DIASTOLIC BLOOD PRESSURE: 74 MMHG | TEMPERATURE: 97.8 F | HEIGHT: 67 IN | SYSTOLIC BLOOD PRESSURE: 174 MMHG | HEART RATE: 70 BPM | BODY MASS INDEX: 21.19 KG/M2 | OXYGEN SATURATION: 98 %

## 2019-09-10 PROCEDURE — 6370000000 HC RX 637 (ALT 250 FOR IP): Performed by: PHYSICAL MEDICINE & REHABILITATION

## 2019-09-10 PROCEDURE — 94761 N-INVAS EAR/PLS OXIMETRY MLT: CPT

## 2019-09-10 PROCEDURE — 99239 HOSP IP/OBS DSCHRG MGMT >30: CPT | Performed by: PHYSICAL MEDICINE & REHABILITATION

## 2019-09-10 PROCEDURE — 94150 VITAL CAPACITY TEST: CPT

## 2019-09-10 PROCEDURE — 6370000000 HC RX 637 (ALT 250 FOR IP): Performed by: HOSPITALIST

## 2019-09-10 PROCEDURE — 6360000002 HC RX W HCPCS: Performed by: PHYSICAL MEDICINE & REHABILITATION

## 2019-09-10 RX ORDER — CLOPIDOGREL BISULFATE 75 MG/1
75 TABLET ORAL DAILY
Qty: 30 TABLET | Refills: 0 | Status: SHIPPED | OUTPATIENT
Start: 2019-09-11

## 2019-09-10 RX ORDER — MEMANTINE HYDROCHLORIDE 5 MG/1
5 TABLET ORAL
Qty: 30 TABLET | Refills: 0 | Status: ON HOLD | OUTPATIENT
Start: 2019-09-10 | End: 2019-12-30 | Stop reason: SDUPTHER

## 2019-09-10 RX ORDER — LISINOPRIL 20 MG/1
20 TABLET ORAL DAILY
Qty: 30 TABLET | Refills: 0 | Status: SHIPPED | OUTPATIENT
Start: 2019-09-11 | End: 2019-12-27

## 2019-09-10 RX ORDER — CALCIUM POLYCARBOPHIL 625 MG 625 MG/1
625 TABLET ORAL DAILY
Refills: 4 | COMMUNITY
Start: 2019-09-11

## 2019-09-10 RX ORDER — PANTOPRAZOLE SODIUM 40 MG/1
40 TABLET, DELAYED RELEASE ORAL
Qty: 30 TABLET | Refills: 0 | Status: SHIPPED | OUTPATIENT
Start: 2019-09-11

## 2019-09-10 RX ORDER — POTASSIUM CHLORIDE 20 MEQ/1
20 TABLET, EXTENDED RELEASE ORAL
Qty: 60 TABLET | Refills: 0 | Status: SHIPPED | OUTPATIENT
Start: 2019-09-11

## 2019-09-10 RX ORDER — HYDROCHLOROTHIAZIDE 25 MG/1
25 TABLET ORAL DAILY
Qty: 30 TABLET | Refills: 0 | Status: ON HOLD | OUTPATIENT
Start: 2019-09-11 | End: 2019-12-30 | Stop reason: HOSPADM

## 2019-09-10 RX ADMIN — POLYETHYLENE GLYCOL (3350) 17 G: 17 POWDER, FOR SOLUTION ORAL at 10:24

## 2019-09-10 RX ADMIN — POTASSIUM CHLORIDE 20 MEQ: 20 TABLET, EXTENDED RELEASE ORAL at 10:22

## 2019-09-10 RX ADMIN — ENOXAPARIN SODIUM 30 MG: 30 INJECTION SUBCUTANEOUS at 10:23

## 2019-09-10 RX ADMIN — DOCUSATE SODIUM 100 MG: 100 CAPSULE, LIQUID FILLED ORAL at 10:22

## 2019-09-10 RX ADMIN — LABETALOL HYDROCHLORIDE 100 MG: 100 TABLET, FILM COATED ORAL at 10:24

## 2019-09-10 RX ADMIN — LISINOPRIL 20 MG: 20 TABLET ORAL at 10:23

## 2019-09-10 RX ADMIN — ACETAMINOPHEN 650 MG: 325 TABLET ORAL at 10:22

## 2019-09-10 RX ADMIN — HYDROCHLOROTHIAZIDE 25 MG: 25 TABLET ORAL at 10:22

## 2019-09-10 RX ADMIN — CLOPIDOGREL BISULFATE 75 MG: 75 TABLET ORAL at 10:23

## 2019-09-10 RX ADMIN — ASPIRIN 81 MG 81 MG: 81 TABLET ORAL at 10:24

## 2019-09-10 RX ADMIN — PANTOPRAZOLE SODIUM 40 MG: 40 TABLET, DELAYED RELEASE ORAL at 06:39

## 2019-09-10 RX ADMIN — CALCIUM POLYCARBOPHIL 625 MG TABLET 625 MG: at 10:22

## 2019-09-10 ASSESSMENT — PAIN DESCRIPTION - DESCRIPTORS: DESCRIPTORS: ACHING

## 2019-09-10 ASSESSMENT — PAIN DESCRIPTION - LOCATION: LOCATION: BACK

## 2019-09-10 ASSESSMENT — PAIN DESCRIPTION - ORIENTATION: ORIENTATION: UPPER;LOWER

## 2019-09-10 ASSESSMENT — PAIN DESCRIPTION - PAIN TYPE: TYPE: CHRONIC PAIN

## 2019-09-10 ASSESSMENT — PAIN SCALES - GENERAL
PAINLEVEL_OUTOF10: 1
PAINLEVEL_OUTOF10: 3

## 2019-09-10 NOTE — DISCHARGE SUMMARY
Patient Name: Bean Barry  Patient :  [id number]  Patient MRN:   [id number]      Admission Date:  2019  Discharge Date: 9/10/2019    Admitting diagnosis: Ischemic stroke with infarction.     Comorbid diagnoses impacting rehabilitation: Right hemiparesis, dysarthria, dysphagia, dementia with cognitive impairment, acute kidney injury, hypertension, obstipation    Discharging diagnosis: Ischemic stroke with infarction.     Comorbid diagnoses impacting rehabilitation: Right hemiparesis, dysarthria, dysphagia, dementia with cognitive impairment, acute kidney injury, hypertension, obstipation        History of present illness: Patient is a 80-year-old right-hand-dominant female who was alert and living alone up until about a year ago. At that time her daughter moved in with her total to supervise her medications and her routine. Patient typically ambulates with a cane and does her own personal hygiene. Thursday of last week she had some confusion and was seen by her PCP. He labeled this a TIA.  2 days later she had a fall at home and was unable to walk unassisted. She was brought into our ED where a left lentiform nucleus infarction was noted. She had wrist and abdominal pain following her fall at home but no fractures were identified. 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  She has had significant confusion worsening in the recent week which cannot be explained by the above stroke. Neurology started Natalie. Prior (baseline) level of function: Independent. Current level of function:     Physical therapy:  Bed Mobility: Scooting: Contact guard assistance  Transfers: Sit to Stand:  Moderate Assistance, Maximum Assistance  Stand to sit: Minimal Assistance  Bed to Chair: Moderate assistance(Increased steadying required due to fatigue), Ambulation 1  Surface: level tile  Device: Rolling Walker  Assistance: Minimal assistance  Quality of Gait: forefoot at initial contact with LLE, FWD bent posture, increased unsteadiness with fatigue and resp rate; reduced step length with shuffling gait pattern   Distance: 10 ft (Min A for steadying) + 40 ft (max effort, with Min A) , Stairs  # Steps : 5  Stairs Height: (4/6)  Rails: Bilateral(Min A ascending, Mod A descending with pt using TC input in order to clear step)  Curbs: 4\"(FWW, Min A)  Mobility: Bed, Chair, Wheel Chair: 4 - Requires steadying assistance only <25% assist  and/or requires assist with one leg only  Walk: 5 - Supervision Requires standby supervision or cuing to walk at least 150 feet  Distance Walked: 230'  Stairs: 4- Minimal Contact Assistance Perfoms 75% or more of the effort to go up and down one flight of stairs, PT Equipment Recommendations  Other: Trixie Mancia requires the assistance of a wheeled walker to successfully ambulate from room to room at home to allow completion of daily living tasks such as: bathing, toileting, dressing and grooming. A wheeled walker is necessary due to the patient's unsteady gait, upper body weakness, inability to  a standard walker.   This patient can ambulate only by pushing a walker instead of using a lesser assistive device such as a cane or crutch. ,      Occupational therapy: Eatin - Feeds self with setup/supervision/cues and/or requires only setup/supervision/cues to perform tube feedings  Groomin - Requires setup/cues to do all tasks  Bathin - Able to bathe 8-9 areas  Dressing-Upper: 4 - Requires assist with buttons/zippers only and/or requires assist with one arm only  Dressing-Lower: 3 - Requires assist with 2-3 parts of dressing  Toiletin - Requires setup/supervision/cues  Toilet Transfer: 5 - Requires setup/supervision/cues  Tub Transfer: 0 - Activity does not occur  Shower Transfer: 4 - Minimal contact assistance, pt. expends 75% or more effort,  ,      Speech therapy:  Comprehension: 5 - Patient understands basic needs (hungry/hot/pain)  Expression: 3 - Expresses basic ideas/needs 50-74% of the time  Social Interaction: 5 - Patient is appropriate with supervision/cues  Problem Solvin - Patient solves simple/routine tasks 25%-49%  Memory: 2 - Patient remembers 25%-49% of the time    Exam:    Blood pressure (!) 174/74, pulse 70, temperature 97.8 °F (36.6 °C), temperature source Oral, resp. rate 16, height 5' 7\" (1.702 m), weight 135 lb (61.2 kg), SpO2 98 %. General: Alert, but quiet. In no distress at rest.    HEENT: Mucous membranes moist.    Pulmonary: Few rhonchi in the bases. Breath sounds are shallow. Cardiac: Regular rate and rhythm with soft systolic murmur as before. Abdomen: Patient's abdomen is soft and nondistended. Bowel sounds were present throughout. There was no rebound, guarding or masses noted. Upper extremities: Functional  bilaterally with quick fatigue on the right. Lower extremities: No new's swelling. No signs of DVT. Weak toe tap on the right. Sitting balance was good. Standing balance was fair-.    Lab Results   Component Value Date    WBC 6.2 2019    HGB 12.2 (L) 2019    HCT 36.7 (L) 2019    MCV 92.0 2019     2019     Lab Results   Component Value Date    INR 1.00 2019    INR 0.97 02/10/2015    PROTIME 11.6 2019    PROTIME 11.1 02/10/2015     Lab Results   Component Value Date    CREATININE 1.2 (H) 2019    BUN 36 (H) 2019     2019    K 4.4 2019    CL 99 2019    CO2 25 2019     Lab Results   Component Value Date    ALT 21 2019    AST 23 2019    ALKPHOS 115 2019    BILITOT 0.2 2019           The patient presented to the ARU with the above history requiring a multidisciplinary treatment plan including close medical supervision by the [facility] Director. The patient participated in the prescribed therapy treatment plan with reasonable compliance and progressive tolerance. They avoided significant medical complications.     By

## 2019-09-10 NOTE — PROGRESS NOTES
Patient discharged to [facility]. Referral for patient's walker was faxed to [facility] and family will pick it up. Family to transport patient to [facility].   YVROSE Centeno/GABRIELLE  9/10/2019, 1:59 PM

## 2019-09-11 ENCOUNTER — CARE COORDINATION (OUTPATIENT)
Dept: CASE MANAGEMENT | Age: 84
End: 2019-09-11

## 2019-09-13 RX ORDER — PRAVASTATIN SODIUM 10 MG
TABLET ORAL
Qty: 90 TABLET | Refills: 3 | Status: SHIPPED | OUTPATIENT
Start: 2019-09-13 | End: 2019-12-27

## 2019-10-08 ENCOUNTER — TELEPHONE (OUTPATIENT)
Dept: CARDIOLOGY CLINIC | Age: 84
End: 2019-10-08

## 2019-10-10 ENCOUNTER — TELEPHONE (OUTPATIENT)
Dept: CARDIOLOGY CLINIC | Age: 84
End: 2019-10-10

## 2019-10-10 ENCOUNTER — PROCEDURE VISIT (OUTPATIENT)
Dept: CARDIOLOGY CLINIC | Age: 84
End: 2019-10-10
Payer: MEDICARE

## 2019-10-10 DIAGNOSIS — Z95.0 CARDIAC PACEMAKER IN SITU: Primary | ICD-10-CM

## 2019-10-10 PROCEDURE — 93296 REM INTERROG EVL PM/IDS: CPT | Performed by: INTERNAL MEDICINE

## 2019-10-10 PROCEDURE — 93294 REM INTERROG EVL PM/LDLS PM: CPT | Performed by: INTERNAL MEDICINE

## 2019-12-18 ENCOUNTER — APPOINTMENT (OUTPATIENT)
Dept: CT IMAGING | Age: 84
End: 2019-12-18
Payer: MEDICARE

## 2019-12-18 ENCOUNTER — HOSPITAL ENCOUNTER (EMERGENCY)
Age: 84
Discharge: NURSING, REHAB OR LONG-TERM CARE | End: 2019-12-18
Attending: EMERGENCY MEDICINE
Payer: MEDICARE

## 2019-12-18 VITALS
OXYGEN SATURATION: 99 % | RESPIRATION RATE: 16 BRPM | WEIGHT: 135 LBS | HEART RATE: 66 BPM | TEMPERATURE: 97.6 F | HEIGHT: 67 IN | BODY MASS INDEX: 21.19 KG/M2 | SYSTOLIC BLOOD PRESSURE: 162 MMHG | DIASTOLIC BLOOD PRESSURE: 79 MMHG

## 2019-12-18 DIAGNOSIS — S09.90XA INJURY OF HEAD, INITIAL ENCOUNTER: ICD-10-CM
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
## 2019-12-18 LAB
ALBUMIN SERPL-MCNC: 3.4 GM/DL (ref 3.4–5)
ALP BLD-CCNC: 84 IU/L (ref 40–129)
ALT SERPL-CCNC: 16 U/L (ref 10–40)
ANION GAP SERPL CALCULATED.3IONS-SCNC: 13 MMOL/L (ref 4–16)
ANISOCYTOSIS: ABNORMAL
AST SERPL-CCNC: 20 IU/L (ref 15–37)
BANDED NEUTROPHILS ABSOLUTE COUNT: 0.16 K/CU MM
BANDED NEUTROPHILS RELATIVE PERCENT: 2 % (ref 5–11)
BILIRUB SERPL-MCNC: 0.4 MG/DL (ref 0–1)
BUN BLDV-MCNC: 25 MG/DL (ref 6–23)
CALCIUM SERPL-MCNC: 9.3 MG/DL (ref 8.3–10.6)
CHLORIDE BLD-SCNC: 100 MMOL/L (ref 99–110)
CO2: 27 MMOL/L (ref 21–32)
CREAT SERPL-MCNC: 0.9 MG/DL (ref 0.6–1.1)
DIFFERENTIAL TYPE: ABNORMAL
EOSINOPHILS ABSOLUTE: 1 K/CU MM
EOSINOPHILS RELATIVE PERCENT: 13 % (ref 0–3)
GFR AFRICAN AMERICAN: >60 ML/MIN/1.73M2
GFR NON-AFRICAN AMERICAN: 58 ML/MIN/1.73M2
GLUCOSE BLD-MCNC: 93 MG/DL (ref 70–99)
HCT VFR BLD CALC: 41.1 % (ref 37–47)
HEMOGLOBIN: 13.2 GM/DL (ref 12.5–16)
LYMPHOCYTES ABSOLUTE: 1 K/CU MM
LYMPHOCYTES RELATIVE PERCENT: 12 % (ref 24–44)
MAGNESIUM: 1.7 MG/DL (ref 1.8–2.4)
MCH RBC QN AUTO: 29.5 PG (ref 27–31)
MCHC RBC AUTO-ENTMCNC: 32.1 % (ref 32–36)
MCV RBC AUTO: 91.7 FL (ref 78–100)
METAMYELOCYTES ABSOLUTE COUNT: 0.08 K/CU MM
METAMYELOCYTES PERCENT: 1 %
MONOCYTES ABSOLUTE: 0.8 K/CU MM
MONOCYTES RELATIVE PERCENT: 10 % (ref 0–4)
PDW BLD-RTO: 13.6 % (ref 11.7–14.9)
PLATELET # BLD: 229 K/CU MM (ref 140–440)
PMV BLD AUTO: 11.7 FL (ref 7.5–11.1)
POTASSIUM SERPL-SCNC: 3.1 MMOL/L (ref 3.5–5.1)
RBC # BLD: 4.48 M/CU MM (ref 4.2–5.4)
SEGMENTED NEUTROPHILS ABSOLUTE COUNT: 5 K/CU MM
SEGMENTED NEUTROPHILS RELATIVE PERCENT: 62 % (ref 36–66)
SODIUM BLD-SCNC: 140 MMOL/L (ref 135–145)
TOTAL PROTEIN: 6.2 GM/DL (ref 6.4–8.2)
WBC # BLD: 8 K/CU MM (ref 4–10.5)
WBC # BLD: ABNORMAL 10*3/UL
WBC # BLD: ABNORMAL 10*3/UL

## 2019-12-18 PROCEDURE — 2500000003 HC RX 250 WO HCPCS: Performed by: EMERGENCY MEDICINE

## 2019-12-18 PROCEDURE — 90471 IMMUNIZATION ADMIN: CPT | Performed by: EMERGENCY MEDICINE

## 2019-12-18 PROCEDURE — 72125 CT NECK SPINE W/O DYE: CPT

## 2019-12-18 PROCEDURE — 6360000002 HC RX W HCPCS: Performed by: EMERGENCY MEDICINE

## 2019-12-18 PROCEDURE — 90715 TDAP VACCINE 7 YRS/> IM: CPT | Performed by: EMERGENCY MEDICINE

## 2019-12-18 PROCEDURE — 83735 ASSAY OF MAGNESIUM: CPT

## 2019-12-18 PROCEDURE — 85007 BL SMEAR W/DIFF WBC COUNT: CPT

## 2019-12-18 PROCEDURE — 85027 COMPLETE CBC AUTOMATED: CPT

## 2019-12-18 PROCEDURE — 70450 CT HEAD/BRAIN W/O DYE: CPT

## 2019-12-18 PROCEDURE — 99284 EMERGENCY DEPT VISIT MOD MDM: CPT

## 2019-12-18 PROCEDURE — 36415 COLL VENOUS BLD VENIPUNCTURE: CPT

## 2019-12-18 PROCEDURE — 4500000027

## 2019-12-18 PROCEDURE — 80053 COMPREHEN METABOLIC PANEL: CPT

## 2019-12-18 RX ORDER — LIDOCAINE HYDROCHLORIDE 10 MG/ML
INJECTION, SOLUTION EPIDURAL; INFILTRATION; INTRACAUDAL; PERINEURAL
Status: DISCONTINUED
Start: 2019-12-18 | End: 2019-12-18 | Stop reason: WASHOUT

## 2019-12-18 RX ADMIN — LIDOCAINE HYDROCHLORIDE 10 ML: 10 INJECTION, SOLUTION EPIDURAL; INFILTRATION; INTRACAUDAL; PERINEURAL at 10:53

## 2019-12-18 RX ADMIN — TETANUS TOXOID, REDUCED DIPHTHERIA TOXOID AND ACELLULAR PERTUSSIS VACCINE, ADSORBED 0.5 ML: 5; 2.5; 8; 8; 2.5 SUSPENSION INTRAMUSCULAR at 10:33

## 2019-12-18 ASSESSMENT — PAIN SCALES - GENERAL
PAINLEVEL_OUTOF10: 3
PAINLEVEL_OUTOF10: 0

## 2019-12-27 ENCOUNTER — APPOINTMENT (OUTPATIENT)
Dept: GENERAL RADIOLOGY | Age: 84
DRG: 064 | End: 2019-12-27
Payer: MEDICARE

## 2019-12-27 ENCOUNTER — APPOINTMENT (OUTPATIENT)
Dept: CT IMAGING | Age: 84
DRG: 064 | End: 2019-12-27
Payer: MEDICARE

## 2019-12-27 ENCOUNTER — HOSPITAL ENCOUNTER (INPATIENT)
Age: 84
LOS: 3 days | Discharge: NURSING, REHAB OR LONG-TERM CARE | DRG: 064 | End: 2019-12-30
Attending: EMERGENCY MEDICINE | Admitting: HOSPITALIST
Payer: MEDICARE

## 2019-12-27 ENCOUNTER — APPOINTMENT (OUTPATIENT)
Dept: ULTRASOUND IMAGING | Age: 84
DRG: 064 | End: 2019-12-27
Payer: MEDICARE

## 2019-12-27 PROBLEM — R47.81 SLURRED SPEECH: Status: ACTIVE | Noted: 2019-12-27

## 2019-12-27 LAB
ALBUMIN SERPL-MCNC: 3.8 GM/DL (ref 3.4–5)
ALP BLD-CCNC: 85 IU/L (ref 40–129)
ALT SERPL-CCNC: 14 U/L (ref 10–40)
ANION GAP SERPL CALCULATED.3IONS-SCNC: 12 MMOL/L (ref 4–16)
AST SERPL-CCNC: 16 IU/L (ref 15–37)
BASOPHILS ABSOLUTE: 0.1 K/CU MM
BASOPHILS RELATIVE PERCENT: 0.9 % (ref 0–1)
BILIRUB SERPL-MCNC: 0.2 MG/DL (ref 0–1)
BUN BLDV-MCNC: 29 MG/DL (ref 6–23)
CALCIUM SERPL-MCNC: 9.5 MG/DL (ref 8.3–10.6)
CHLORIDE BLD-SCNC: 96 MMOL/L (ref 99–110)
CO2: 26 MMOL/L (ref 21–32)
CREAT SERPL-MCNC: 1.5 MG/DL (ref 0.6–1.1)
DIFFERENTIAL TYPE: ABNORMAL
EOSINOPHILS ABSOLUTE: 1.2 K/CU MM
EOSINOPHILS RELATIVE PERCENT: 16.7 % (ref 0–3)
GFR AFRICAN AMERICAN: 39 ML/MIN/1.73M2
GFR NON-AFRICAN AMERICAN: 32 ML/MIN/1.73M2
GLUCOSE BLD-MCNC: 106 MG/DL (ref 70–99)
GLUCOSE BLD-MCNC: 112 MG/DL (ref 70–99)
HCT VFR BLD CALC: 40.2 % (ref 37–47)
HEMOGLOBIN: 12.7 GM/DL (ref 12.5–16)
IMMATURE NEUTROPHIL %: 0.4 % (ref 0–0.43)
INR BLD: 1.01 INDEX
LYMPHOCYTES ABSOLUTE: 1.3 K/CU MM
LYMPHOCYTES RELATIVE PERCENT: 18.9 % (ref 24–44)
MCH RBC QN AUTO: 28.8 PG (ref 27–31)
MCHC RBC AUTO-ENTMCNC: 31.6 % (ref 32–36)
MCV RBC AUTO: 91.2 FL (ref 78–100)
MONOCYTES ABSOLUTE: 0.9 K/CU MM
MONOCYTES RELATIVE PERCENT: 13.4 % (ref 0–4)
NUCLEATED RBC %: 0 %
PDW BLD-RTO: 13.8 % (ref 11.7–14.9)
PLATELET # BLD: 299 K/CU MM (ref 140–440)
PMV BLD AUTO: 11.1 FL (ref 7.5–11.1)
POTASSIUM SERPL-SCNC: 3.7 MMOL/L (ref 3.5–5.1)
PROTHROMBIN TIME: 12.2 SECONDS (ref 11.7–14.5)
RBC # BLD: 4.41 M/CU MM (ref 4.2–5.4)
SEGMENTED NEUTROPHILS ABSOLUTE COUNT: 3.5 K/CU MM
SEGMENTED NEUTROPHILS RELATIVE PERCENT: 49.7 % (ref 36–66)
SODIUM BLD-SCNC: 134 MMOL/L (ref 135–145)
TOTAL IMMATURE NEUTOROPHIL: 0.03 K/CU MM
TOTAL NUCLEATED RBC: 0 K/CU MM
TOTAL PROTEIN: 6.6 GM/DL (ref 6.4–8.2)
TROPONIN T: 0.01 NG/ML
WBC # BLD: 7 K/CU MM (ref 4–10.5)

## 2019-12-27 PROCEDURE — 71045 X-RAY EXAM CHEST 1 VIEW: CPT

## 2019-12-27 PROCEDURE — 82962 GLUCOSE BLOOD TEST: CPT

## 2019-12-27 PROCEDURE — 99285 EMERGENCY DEPT VISIT HI MDM: CPT

## 2019-12-27 PROCEDURE — 70496 CT ANGIOGRAPHY HEAD: CPT

## 2019-12-27 PROCEDURE — 93970 EXTREMITY STUDY: CPT

## 2019-12-27 PROCEDURE — 6370000000 HC RX 637 (ALT 250 FOR IP): Performed by: EMERGENCY MEDICINE

## 2019-12-27 PROCEDURE — 1200000000 HC SEMI PRIVATE

## 2019-12-27 PROCEDURE — 70450 CT HEAD/BRAIN W/O DYE: CPT

## 2019-12-27 PROCEDURE — 6360000002 HC RX W HCPCS: Performed by: HOSPITALIST

## 2019-12-27 PROCEDURE — 36415 COLL VENOUS BLD VENIPUNCTURE: CPT

## 2019-12-27 PROCEDURE — 6360000002 HC RX W HCPCS: Performed by: EMERGENCY MEDICINE

## 2019-12-27 PROCEDURE — 6360000004 HC RX CONTRAST MEDICATION: Performed by: EMERGENCY MEDICINE

## 2019-12-27 PROCEDURE — 6370000000 HC RX 637 (ALT 250 FOR IP): Performed by: HOSPITALIST

## 2019-12-27 PROCEDURE — 6360000002 HC RX W HCPCS: Performed by: PHYSICIAN ASSISTANT

## 2019-12-27 PROCEDURE — 93005 ELECTROCARDIOGRAM TRACING: CPT | Performed by: EMERGENCY MEDICINE

## 2019-12-27 PROCEDURE — 83090 ASSAY OF HOMOCYSTEINE: CPT

## 2019-12-27 PROCEDURE — 80053 COMPREHEN METABOLIC PANEL: CPT

## 2019-12-27 PROCEDURE — 96365 THER/PROPH/DIAG IV INF INIT: CPT

## 2019-12-27 PROCEDURE — 2580000003 HC RX 258: Performed by: HOSPITALIST

## 2019-12-27 PROCEDURE — 85610 PROTHROMBIN TIME: CPT

## 2019-12-27 PROCEDURE — 84484 ASSAY OF TROPONIN QUANT: CPT

## 2019-12-27 PROCEDURE — 85025 COMPLETE CBC W/AUTO DIFF WBC: CPT

## 2019-12-27 PROCEDURE — 70498 CT ANGIOGRAPHY NECK: CPT

## 2019-12-27 RX ORDER — LABETALOL 100 MG/1
100 TABLET, FILM COATED ORAL 2 TIMES DAILY
Status: DISCONTINUED | OUTPATIENT
Start: 2019-12-27 | End: 2019-12-30 | Stop reason: HOSPADM

## 2019-12-27 RX ORDER — CALCIUM POLYCARBOPHIL 625 MG 625 MG/1
625 TABLET ORAL DAILY
Status: DISCONTINUED | OUTPATIENT
Start: 2019-12-28 | End: 2019-12-30 | Stop reason: HOSPADM

## 2019-12-27 RX ORDER — LISINOPRIL 40 MG/1
40 TABLET ORAL 2 TIMES DAILY
Status: ON HOLD | COMMUNITY
End: 2019-12-30 | Stop reason: HOSPADM

## 2019-12-27 RX ORDER — ASPIRIN 81 MG/1
324 TABLET, CHEWABLE ORAL ONCE
Status: COMPLETED | OUTPATIENT
Start: 2019-12-27 | End: 2019-12-27

## 2019-12-27 RX ORDER — SODIUM CHLORIDE 0.9 % (FLUSH) 0.9 %
10 SYRINGE (ML) INJECTION PRN
Status: DISCONTINUED | OUTPATIENT
Start: 2019-12-27 | End: 2019-12-30 | Stop reason: HOSPADM

## 2019-12-27 RX ORDER — HALOPERIDOL 5 MG/ML
2 INJECTION INTRAMUSCULAR ONCE
Status: COMPLETED | OUTPATIENT
Start: 2019-12-27 | End: 2019-12-27

## 2019-12-27 RX ORDER — SODIUM CHLORIDE 9 MG/ML
INJECTION, SOLUTION INTRAVENOUS CONTINUOUS
Status: DISCONTINUED | OUTPATIENT
Start: 2019-12-27 | End: 2019-12-30 | Stop reason: HOSPADM

## 2019-12-27 RX ORDER — ACETAMINOPHEN 325 MG/1
650 TABLET ORAL EVERY 6 HOURS PRN
COMMUNITY

## 2019-12-27 RX ORDER — CLOPIDOGREL BISULFATE 75 MG/1
75 TABLET ORAL DAILY
Status: DISCONTINUED | OUTPATIENT
Start: 2019-12-28 | End: 2019-12-30 | Stop reason: HOSPADM

## 2019-12-27 RX ORDER — POLYETHYLENE GLYCOL 3350 17 G/17G
17 POWDER, FOR SOLUTION ORAL DAILY
Status: DISCONTINUED | OUTPATIENT
Start: 2019-12-28 | End: 2019-12-30 | Stop reason: HOSPADM

## 2019-12-27 RX ORDER — HYDRALAZINE HYDROCHLORIDE 20 MG/ML
10 INJECTION INTRAMUSCULAR; INTRAVENOUS EVERY 6 HOURS PRN
Status: DISCONTINUED | OUTPATIENT
Start: 2019-12-27 | End: 2019-12-30 | Stop reason: HOSPADM

## 2019-12-27 RX ORDER — HYDRALAZINE HYDROCHLORIDE 25 MG/1
25 TABLET, FILM COATED ORAL 2 TIMES DAILY
Status: DISCONTINUED | OUTPATIENT
Start: 2019-12-27 | End: 2019-12-29

## 2019-12-27 RX ORDER — HYDRALAZINE HYDROCHLORIDE 25 MG/1
25 TABLET, FILM COATED ORAL 2 TIMES DAILY
Status: ON HOLD | COMMUNITY
End: 2019-12-30 | Stop reason: HOSPADM

## 2019-12-27 RX ORDER — PANTOPRAZOLE SODIUM 40 MG/1
40 TABLET, DELAYED RELEASE ORAL
Status: DISCONTINUED | OUTPATIENT
Start: 2019-12-28 | End: 2019-12-30 | Stop reason: HOSPADM

## 2019-12-27 RX ORDER — ATORVASTATIN CALCIUM 40 MG/1
80 TABLET, FILM COATED ORAL NIGHTLY
Status: DISCONTINUED | OUTPATIENT
Start: 2019-12-27 | End: 2019-12-30 | Stop reason: HOSPADM

## 2019-12-27 RX ORDER — ACETAMINOPHEN 325 MG/1
650 TABLET ORAL EVERY 6 HOURS PRN
Status: DISCONTINUED | OUTPATIENT
Start: 2019-12-27 | End: 2019-12-30 | Stop reason: HOSPADM

## 2019-12-27 RX ORDER — ONDANSETRON 2 MG/ML
4 INJECTION INTRAMUSCULAR; INTRAVENOUS EVERY 6 HOURS PRN
Status: DISCONTINUED | OUTPATIENT
Start: 2019-12-27 | End: 2019-12-30 | Stop reason: HOSPADM

## 2019-12-27 RX ORDER — ASPIRIN 81 MG/1
81 TABLET, CHEWABLE ORAL DAILY
Status: DISCONTINUED | OUTPATIENT
Start: 2019-12-28 | End: 2019-12-30 | Stop reason: HOSPADM

## 2019-12-27 RX ORDER — MEMANTINE HYDROCHLORIDE 5 MG/1
5 TABLET ORAL
Status: DISCONTINUED | OUTPATIENT
Start: 2019-12-27 | End: 2019-12-30 | Stop reason: HOSPADM

## 2019-12-27 RX ORDER — SODIUM CHLORIDE 0.9 % (FLUSH) 0.9 %
10 SYRINGE (ML) INJECTION EVERY 12 HOURS SCHEDULED
Status: DISCONTINUED | OUTPATIENT
Start: 2019-12-27 | End: 2019-12-30 | Stop reason: HOSPADM

## 2019-12-27 RX ORDER — VANCOMYCIN HYDROCHLORIDE 1 G/200ML
1000 INJECTION, SOLUTION INTRAVENOUS ONCE
Status: COMPLETED | OUTPATIENT
Start: 2019-12-27 | End: 2019-12-27

## 2019-12-27 RX ADMIN — ATORVASTATIN CALCIUM 80 MG: 40 TABLET, FILM COATED ORAL at 23:10

## 2019-12-27 RX ADMIN — IOPAMIDOL 80 ML: 755 INJECTION, SOLUTION INTRAVENOUS at 18:43

## 2019-12-27 RX ADMIN — HYDRALAZINE HYDROCHLORIDE 25 MG: 25 TABLET, FILM COATED ORAL at 23:10

## 2019-12-27 RX ADMIN — VANCOMYCIN HYDROCHLORIDE 1000 MG: 1 INJECTION, SOLUTION INTRAVENOUS at 21:54

## 2019-12-27 RX ADMIN — MEMANTINE HYDROCHLORIDE 5 MG: 5 TABLET ORAL at 23:10

## 2019-12-27 RX ADMIN — CEFTRIAXONE 1 G: 1 INJECTION, POWDER, FOR SOLUTION INTRAMUSCULAR; INTRAVENOUS at 23:19

## 2019-12-27 RX ADMIN — HALOPERIDOL LACTATE 2 MG: 5 INJECTION INTRAMUSCULAR at 23:24

## 2019-12-27 RX ADMIN — LABETALOL HYDROCHLORIDE 100 MG: 100 TABLET, FILM COATED ORAL at 23:10

## 2019-12-27 RX ADMIN — SODIUM CHLORIDE, PRESERVATIVE FREE 10 ML: 5 INJECTION INTRAVENOUS at 23:10

## 2019-12-27 RX ADMIN — ASPIRIN 81 MG 324 MG: 81 TABLET ORAL at 20:55

## 2019-12-27 RX ADMIN — SODIUM CHLORIDE: 9 INJECTION, SOLUTION INTRAVENOUS at 23:10

## 2019-12-27 ASSESSMENT — PAIN SCALES - GENERAL: PAINLEVEL_OUTOF10: 0

## 2019-12-27 NOTE — ED NOTES
Bed: 03TR-03  Expected date:   Expected time:   Means of arrival:   Comments:  Bob [address], RN  12/27/19 [id number]

## 2019-12-27 NOTE — ED PROVIDER NOTES
eMERGENCY dEPARTMENT eNCOUnter      CHIEF COMPLAINT:   Altered mental status      CRITICAL CARE NOTE:  There was a high probability of clinically significant life-threatening deterioration of the patient's condition requiring my urgent intervention. Total critical care time is 35 minutes  This includes vital sign monitoring, pulse oximetry monitoring, telemetry monitoring, clinical response to the IV medications, reviewing the nursing notes, consultation time, dictation/documetation time. (This time excludes time spent performing procedures). HPI: Esvin Irby is a 80 y.o. female who presents to the emergency department, via EMS, from the nursing home for evaluation after she had an episode of altered mental status and slurred speech. The patient is a poor historian and daughter at the bedside provides the history. She was last known to be well at 5 PM. Daughter states that they had just finished dinner when the patient became anxious and started breathing quickly. She has a history of symptoms so the daughter did not initially think anything of it. However, the patient then had an episode of confusion and slurred speech that lasted a few minutes. It was constant but is since resolved. The nurse took her blood pressure and found that it was in the 844S systolic and called the squad. Squad called a stroke alert prehospital. The patient is at her baseline mental status at this time per daughter. She has no complaints. Daughter states that she has been having leg redness and swelling R>L and is being treated with antibiotics for cellulitis. The patient is a full code. No further information is available. REVIEW OF SYSTEMS:   \"Remaining review of systems unable to obtain as patient is a poor historianI have reviewed the nursing triage documentation and agree unless otherwise noted below. \"      PAST MEDICAL HISTORY:   Past Medical History:   Diagnosis Date    Arthritis     Cerebral artery occlusion with cerebral infarction [facility]     Hyperlipidemia     Hypertension     Impaction of the bowels ([facility] [facility]     Pacemaker 6/9/16    Medtronic Dual chamber implanted per Rasheed Acosta for Asystole for 6 seconds (SSS & intermittent high grade AVB).  SSS (sick sinus syndrome) ([facility] [address] 6/16       CURRENT MEDICATIONS:   Home medications reviewed. SURGICAL HISTORY:   Past Surgical History:   Procedure Laterality Date    APPENDECTOMY      BACK SURGERY      denervation x2    COLONOSCOPY      EYE SURGERY Bilateral     cataract removal    HIP SURGERY      JOINT REPLACEMENT      R hip, R knee replacement    KNEE SURGERY      PACEMAKER INSERTION  6/9/16    Medtronic Dual-Chamber pacemaker per Rasheed Acosta.  PACEMAKER PLACEMENT      TONSILLECTOMY         FAMILY HISTORY:   History reviewed. No pertinent family history. SOCIAL HISTORY:   Social History     Socioeconomic History    Marital status:       Spouse name: Not on file    Number of children: Not on file    Years of education: Not on file    Highest education level: Not on file   Occupational History    Not on file   Social Needs    Financial resource strain: Not on file    Food insecurity:     Worry: Not on file     Inability: Not on file    Transportation needs:     Medical: Not on file     Non-medical: Not on file   Tobacco Use    Smoking status: Never Smoker    Smokeless tobacco: Never Used   Substance and Sexual Activity    Alcohol use: No    Drug use: No    Sexual activity: Never   Lifestyle    Physical activity:     Days per week: Not on file     Minutes per session: Not on file    Stress: Not on file   Relationships    Social connections:     Talks on phone: Not on file     Gets together: Not on file     Attends L[employer]-day service: Not on file     Active member of club or organization: Not on file     Attends meetings of clubs or organizations: Not on file     Relationship status: Not on file    Intimate partner violence:     Fear of current of DVT in either lower extremity. Narrative:    EXAMINATION:  DUPLEX VENOUS ULTRASOUND OF THE BILATERAL LOWER EXTREMITIES, 12/27/2019 7:48  pm    TECHNIQUE:  Duplex ultrasound and Doppler images were obtained of the bilateral lower  extremities    COMPARISON:  None. HISTORY:  ORDERING SYSTEM PROVIDED HISTORY: Leg pain, redness, swelling  TECHNOLOGIST PROVIDED HISTORY:  Reason for exam:->Leg pain, redness, swelling  Reason for Exam: Rt leg calf very red and painful  Acuity: Acute  Type of Exam: Initial    FINDINGS:  The visualized veins of the bilateral lower extremities are patent and free  of echogenic thrombus. The veins are normally compressible and have normal  phasic flow.                    XR CHEST PORTABLE (Final result)   Result time 12/27/19 19:48:02   Final result by Génesis Albrecht MD (12/27/19 19:48:02)                Impression:    1. Findings likely reflecting small effusions and bibasilar atelectasis. 2. Chronic underlying interstitial changes throughout the lungs.             Narrative:    EXAMINATION:  ONE XRAY VIEW OF THE CHEST    12/27/2019 7:13 pm    COMPARISON:  Chest x-ray September 5, 2019 and August 18, 2019    HISTORY:  ORDERING SYSTEM PROVIDED HISTORY: chest pain  TECHNOLOGIST PROVIDED HISTORY:  Reason for exam:->chest pain  Reason for Exam: chest pain  Acuity: Unknown  Type of Exam: Initial  Additional signs and symptoms: stroke alert  Relevant Medical/Surgical History: pacer, htn    FINDINGS:  Cardiac silhouette is enlarged but stable.  Atherosclerotic changes of the  aorta.  Left-sided cardiac pacer device remains in place.  Chronic  interstitial changes of the lungs.  Blunting of the bilateral costophrenic  angles with mild bibasilar opacities present.  Findings favored to reflect  small effusions and atelectasis.  Scarring noted at the bilateral lung  apices.  No pneumothorax identified.  Osseous structures appear intact.  The  bones are osteopenic.                    CTA NECK W CONTRAST (Final result)   Result time 12/27/19 19:26:19   Final result by Kimber Salvador MD (12/27/19 19:26:19)                Impression:    Fetal origin both PCAs.  Mid basilar occlusion. Otherwise moderate atherosclerotic disease identified involving the posterior  circulation notably involving the proximal basilar artery and intradural  vertebral arteries    Moderate tandem areas of stenosis involving the ACAs. Moderate plaque both proximal ICAs without hemodynamic stenosis or occlusion. Narrative:    EXAMINATION:  CTA OF THE NECK; CTA OF THE HEAD WITH CONTRAST 12/27/2019 6:42 pm; 12/27/2019  6:44 pm:    TECHNIQUE:  CTA of the neck was performed with the administration of intravenous  contrast. Multiplanar reformatted images are provided for review.  MIP images  are provided for review. Stenosis of the internal carotid arteries measured  using NASCET criteria. Dose modulation, iterative reconstruction, and/or  weight based adjustment of the mA/kV was utilized to reduce the radiation  dose to as low as reasonably achievable.; CTA of the head/brain was performed  with the administration of intravenous contrast. Multiplanar reformatted  images are provided for review.  MIP images are provided for review. Dose  modulation, iterative reconstruction, and/or weight based adjustment of the  mA/kV was utilized to reduce the radiation dose to as low as reasonably  achievable. COMPARISON:  CT head 12/27/2019, MRA brain 08/19/2019    HISTORY:  ORDERING SYSTEM PROVIDED HISTORY: Dizziness  TECHNOLOGIST PROVIDED HISTORY:  Has a \"code stroke\" or \"stroke alert\" been called? ->Yes  Reason for exam:->Dizziness  Reason for Exam: stroke alert, dizzy weakness  Type of Exam: Initial  Additional signs and symptoms: 80 cc isovue 370  Relevant Medical/Surgical History: hx/ cva,; ORDERING SYSTEM PROVIDED  HISTORY: Dizziness  TECHNOLOGIST PROVIDED HISTORY:  Has a \"code stroke\" or \"stroke alert\" been Dizziness  TECHNOLOGIST PROVIDED HISTORY:  Has a \"code stroke\" or \"stroke alert\" been called? ->Yes  Reason for exam:->Dizziness  Reason for Exam: stroke alert,, dizzy, weakness  Acuity: Acute  Type of Exam: Initial  Additional signs and symptoms: 80 cc isovue 370/// cta neck  Relevant Medical/Surgical History: hx cva, pacer    FINDINGS:    CTA NECK:    AORTIC ARCH/ARCH VESSELS: No dissection or arterial injury.  No significant  stenosis of the brachiocephalic or subclavian arteries. CAROTID ARTERIES: Moderate calcific plaque identified involving both the  bifurcations.  40% narrowing right proximal ICA and 50% narrowing left  proximal ICA per NASCET criteria no dissection, arterial injury, or  hemodynamically significant stenosis by NASCET criteria. VERTEBRAL ARTERIES: 50% narrowing at the ostium of the left vertebral artery. Otherwise common no dissection, arterial injury, or significant stenosis. SOFT TISSUES: Interstitial thickening identified both lower lobes worrisome  for interstitial edema.  Patchy opacity identified involving right upper lobe  with some pleural base calcifications noted.  No cervical or superior  mediastinal lymphadenopathy.  The larynx and pharynx are unremarkable.  No  acute abnormality of the salivary and thyroid glands. BONES: Multilevel degenerate changes of the cervical spine. CTA HEAD:    ANTERIOR CIRCULATION: Moderate tandem areas of stenosis identified involving  both ACAs without definite severe occlusion.  No significant stenosis of the  intracranial internal carotid,  or middle cerebral arteries. No aneurysm. POSTERIOR CIRCULATION: Fetal origin PCAs.  There is occlusion involving the  mid basilar artery.  Moderate stenosis identified involving the proximal  basilar artery.  Mild-to-moderate plaque identified involving both vertebral  arteries. BRAIN: No mass effect or midline shift. No extra-axial fluid collection.  The  gray-white differentiation is maintained.  Chronic lacune infarct identified  left basal ganglia.  Generalized involutional change.  Chronic small vessel  ischemic disease.                    CT HEAD WO CONTRAST (Final result)   Result time 12/27/19 18:53:38   Final result by Hernandez Love MD (12/27/19 18:53:38)                Impression:    1. No acute intracranial abnormality identified. 2. Redemonstration of chronic bilateral basal ganglia lacunar infarctions. Next chronic microvascular ischemic changes and global cerebral atrophy. Findings were discussed with TG VINSON at 6:53 pm on 12/27/2019. Narrative:    EXAMINATION:  CT OF THE HEAD WITHOUT CONTRAST  12/27/2019 6:40 pm    TECHNIQUE:  CT of the head was performed without the administration of intravenous  contrast. Dose modulation, iterative reconstruction, and/or weight based  adjustment of the mA/kV was utilized to reduce the radiation dose to as low  as reasonably achievable. COMPARISON:  CT head December 18, 2019    HISTORY:  ORDERING SYSTEM PROVIDED HISTORY: Dizziness  TECHNOLOGIST PROVIDED HISTORY:  Has a \"code stroke\" or \"stroke alert\" been called? ->Yes  Reason for exam:->Dizziness  Reason for Exam: stroke alert,, weakness, dizzy  Acuity: Acute  Type of Exam: Initial  Additional signs and symptoms: none  Relevant Medical/Surgical History: hx cva, pacer    FINDINGS:  BRAIN/VENTRICLES: There is no acute intracranial hemorrhage, mass effect or  midline shift.  No abnormal extra-axial fluid collection.  Redemonstration of  remote bilateral basal ganglia lacunar infarctions.  The gray-white  differentiation is otherwise grossly maintained without evidence of an acute  infarct.  There is no evidence of hydrocephalus.  There is mild  periventricular and subcortical white matter hypoattenuation most consistent  with microvascular ischemic changes.  Atherosclerotic changes of the  intracranial vasculature. [place] Oliverio is mild age appropriate global other components within normal limits   PROTIME-INR   TROPONIN   MAGNESIUM   HEMOGLOBIN A1C   POCT GLUCOSE   POCT GLUCOSE       Stroke alert timing details  1. Physician evaluation performed at: 18:40  2. Stroke alert called at: Prehospital (18:30)  3. CT results obtained at: 18:53  4. Decision to administer tPA at: Patient is not a tPA candidate per stroke neurologist, Dr. Adalgisa Freitas, as she has no current deficits with an NIH stroke scale of 0. Risks and benefits of tPA were discussed with patient and her daughter. Daughter is in agreement with no tPA. ED COURSE & MEDICAL DECISION MAKING:  Pertinent Labs & Imaging studies reviewed. (See chart for details)  Stroke alert was called via EMS prior to patient arrival. Point-of-care glucose is 112 and the patient was taken immediately for CT scan of the head. I examine the patient when she returned from CT scan. On exam, the patient is afebrile and nontoxic appearing. She is hypertensive at times but is asymptomatic. She is otherwise hemodynamically stable and at her neurological baseline. NIH stroke scale is 0. EKG shows a normal sinus rhythm with no ST elevation or depression. Labs are obtained and are significant for renal insufficiency and undetectable troponin with no other clinically significant lab abnormalities. CT head is negative for acute abnormality. I spoke with the stroke neurologist at [facility], Dr. Adalgisa Freitas, who states that the patient is not a tPA candidate as she has no current deficits with an NIH stroke scale of 0. Risks and benefits of tPA were discussed with patient and her daughter. Daughter is in agreement with no tPA. The radiologist then called me with the results of the patient's CTA head and neck. There is fetal origin of both PTCAs with med basilar occlusion.  Otherwise, there is moderate atherosclerotic disease identified involving the posterior circulation notably involving the proximal basilar artery and intradural vertebral arteries. There are moderate tandem areas of stenosis involving the before meals 8. There is moderate plaque in both proximal ICA's without hemodynamic stenosis or occlusion. I spoke again to Dr. Bernabe Ferrara, who states that we could transfer the patient via flight to their facility for an angiogram given the mid basilar occlusion. I discussed this with the patient and her daughter. The patient's daughter states that she does not want this done at this time. We discussed the risks and benefits. She states that she just wants the patient to be admitted here. She wants her to be kept comfortable and calm. The patient remains a full code, but daughter states that she will decide how aggressive to be with treatment and interventions. Dr. Bernbae Ferrara was updated. The patient was treated with aspirin and was given IV vancomycin for right lower extremity cellulitis. I suspect that the patient. I had an episode of slurred speech and confusion. Etiology is unclear at this time. She could've had a TIA. She also has right lower extremity cellulitis. Venous Doppler was negative for DVT. have a low suspicion forintracranial hemorrhage, brain mass, herniation, sepsis or shock. I recommended admission to the hospital and the patient was agreeable. I discussed the case with the hospitalist who will admit the patient for further treatment and care. The patient is currently in stable condition awaiting admission. Clinical Impression:  1. Slurred speech    2. Confusion    3. Cellulitis of right lower extremity    4. Renal insufficiency    5. Basilar artery occlusion          Comment: Please note this report has been produced using speech recognition software and may contain errors related to that system including errors in grammar, punctuation, and spelling, as well as words and phrases that may be inappropriate.  If there are any questions or concerns please feel free to contact the dictating provider for

## 2019-12-28 ENCOUNTER — APPOINTMENT (OUTPATIENT)
Dept: GENERAL RADIOLOGY | Age: 84
DRG: 064 | End: 2019-12-28
Payer: MEDICARE

## 2019-12-28 LAB
ANION GAP SERPL CALCULATED.3IONS-SCNC: 13 MMOL/L (ref 4–16)
BUN BLDV-MCNC: 30 MG/DL (ref 6–23)
CALCIUM SERPL-MCNC: 8.8 MG/DL (ref 8.3–10.6)
CHLORIDE BLD-SCNC: 103 MMOL/L (ref 99–110)
CHOLESTEROL: 186 MG/DL
CO2: 23 MMOL/L (ref 21–32)
CREAT SERPL-MCNC: 1.2 MG/DL (ref 0.6–1.1)
ESTIMATED AVERAGE GLUCOSE: 111 MG/DL
GFR AFRICAN AMERICAN: 50 ML/MIN/1.73M2
GFR NON-AFRICAN AMERICAN: 41 ML/MIN/1.73M2
GLUCOSE BLD-MCNC: 108 MG/DL (ref 70–99)
HBA1C MFR BLD: 5.5 % (ref 4.2–6.3)
HCT VFR BLD CALC: 35.3 % (ref 37–47)
HDLC SERPL-MCNC: 44 MG/DL
HEMOGLOBIN: 11.2 GM/DL (ref 12.5–16)
HOMOCYSTEINE: ABNORMAL UMOL/L (ref 0–10)
LDL CHOLESTEROL DIRECT: 134 MG/DL
MAGNESIUM: 1.9 MG/DL (ref 1.8–2.4)
MCH RBC QN AUTO: 28.8 PG (ref 27–31)
MCHC RBC AUTO-ENTMCNC: 31.7 % (ref 32–36)
MCV RBC AUTO: 90.7 FL (ref 78–100)
PDW BLD-RTO: 13.7 % (ref 11.7–14.9)
PLATELET # BLD: 252 K/CU MM (ref 140–440)
PMV BLD AUTO: 11.3 FL (ref 7.5–11.1)
POTASSIUM SERPL-SCNC: 3.5 MMOL/L (ref 3.5–5.1)
RBC # BLD: 3.89 M/CU MM (ref 4.2–5.4)
SODIUM BLD-SCNC: 139 MMOL/L (ref 135–145)
TRIGL SERPL-MCNC: 96 MG/DL
TROPONIN T: 0.01 NG/ML
TROPONIN T: <0.01 NG/ML
WBC # BLD: 7.4 K/CU MM (ref 4–10.5)

## 2019-12-28 PROCEDURE — 2580000003 HC RX 258: Performed by: HOSPITALIST

## 2019-12-28 PROCEDURE — 1200000000 HC SEMI PRIVATE

## 2019-12-28 PROCEDURE — 83036 HEMOGLOBIN GLYCOSYLATED A1C: CPT

## 2019-12-28 PROCEDURE — 83721 ASSAY OF BLOOD LIPOPROTEIN: CPT

## 2019-12-28 PROCEDURE — 84484 ASSAY OF TROPONIN QUANT: CPT

## 2019-12-28 PROCEDURE — 6360000002 HC RX W HCPCS: Performed by: HOSPITALIST

## 2019-12-28 PROCEDURE — 97530 THERAPEUTIC ACTIVITIES: CPT

## 2019-12-28 PROCEDURE — 92610 EVALUATE SWALLOWING FUNCTION: CPT

## 2019-12-28 PROCEDURE — 6370000000 HC RX 637 (ALT 250 FOR IP): Performed by: PSYCHIATRY & NEUROLOGY

## 2019-12-28 PROCEDURE — 74018 RADEX ABDOMEN 1 VIEW: CPT

## 2019-12-28 PROCEDURE — 97162 PT EVAL MOD COMPLEX 30 MIN: CPT

## 2019-12-28 PROCEDURE — 36415 COLL VENOUS BLD VENIPUNCTURE: CPT

## 2019-12-28 PROCEDURE — 93010 ELECTROCARDIOGRAM REPORT: CPT | Performed by: INTERNAL MEDICINE

## 2019-12-28 PROCEDURE — 6370000000 HC RX 637 (ALT 250 FOR IP): Performed by: HOSPITALIST

## 2019-12-28 PROCEDURE — 80061 LIPID PANEL: CPT

## 2019-12-28 PROCEDURE — 80048 BASIC METABOLIC PNL TOTAL CA: CPT

## 2019-12-28 PROCEDURE — 83735 ASSAY OF MAGNESIUM: CPT

## 2019-12-28 PROCEDURE — 85027 COMPLETE CBC AUTOMATED: CPT

## 2019-12-28 RX ORDER — B12/LEVOMEFOLATE CALCIUM/B-6 2-1.13-25
1 TABLET ORAL DAILY
Status: DISCONTINUED | OUTPATIENT
Start: 2019-12-28 | End: 2019-12-30 | Stop reason: HOSPADM

## 2019-12-28 RX ORDER — DONEPEZIL HYDROCHLORIDE 5 MG/1
5 TABLET, FILM COATED ORAL NIGHTLY
Status: DISCONTINUED | OUTPATIENT
Start: 2019-12-28 | End: 2019-12-29

## 2019-12-28 RX ADMIN — CALCIUM POLYCARBOPHIL 625 MG TABLET 625 MG: at 10:37

## 2019-12-28 RX ADMIN — CEFTRIAXONE 1 G: 1 INJECTION, POWDER, FOR SOLUTION INTRAMUSCULAR; INTRAVENOUS at 23:05

## 2019-12-28 RX ADMIN — MEMANTINE HYDROCHLORIDE 5 MG: 5 TABLET ORAL at 16:20

## 2019-12-28 RX ADMIN — LABETALOL HYDROCHLORIDE 100 MG: 100 TABLET, FILM COATED ORAL at 10:36

## 2019-12-28 RX ADMIN — ACETAMINOPHEN 650 MG: 325 TABLET ORAL at 14:48

## 2019-12-28 RX ADMIN — SODIUM CHLORIDE, PRESERVATIVE FREE 10 ML: 5 INJECTION INTRAVENOUS at 20:11

## 2019-12-28 RX ADMIN — HYDRALAZINE HYDROCHLORIDE 25 MG: 25 TABLET, FILM COATED ORAL at 20:11

## 2019-12-28 RX ADMIN — PANTOPRAZOLE SODIUM 40 MG: 40 TABLET, DELAYED RELEASE ORAL at 06:47

## 2019-12-28 RX ADMIN — ENOXAPARIN SODIUM 30 MG: 30 INJECTION SUBCUTANEOUS at 10:37

## 2019-12-28 RX ADMIN — CLOPIDOGREL BISULFATE 75 MG: 75 TABLET ORAL at 10:37

## 2019-12-28 RX ADMIN — DONEPEZIL HYDROCHLORIDE 5 MG: 5 TABLET, FILM COATED ORAL at 20:11

## 2019-12-28 RX ADMIN — ASPIRIN 81 MG 81 MG: 81 TABLET ORAL at 10:37

## 2019-12-28 RX ADMIN — POLYETHYLENE GLYCOL (3350) 17 G: 17 POWDER, FOR SOLUTION ORAL at 10:37

## 2019-12-28 RX ADMIN — SODIUM CHLORIDE: 9 INJECTION, SOLUTION INTRAVENOUS at 20:16

## 2019-12-28 RX ADMIN — Medication 1 TABLET: at 16:20

## 2019-12-28 RX ADMIN — MAGNESIUM HYDROXIDE 30 ML: 400 SUSPENSION ORAL at 14:46

## 2019-12-28 RX ADMIN — HYDRALAZINE HYDROCHLORIDE 10 MG: 20 INJECTION INTRAMUSCULAR; INTRAVENOUS at 06:49

## 2019-12-28 RX ADMIN — HYDRALAZINE HYDROCHLORIDE 25 MG: 25 TABLET, FILM COATED ORAL at 10:37

## 2019-12-28 RX ADMIN — ATORVASTATIN CALCIUM 80 MG: 40 TABLET, FILM COATED ORAL at 20:11

## 2019-12-28 ASSESSMENT — PAIN SCALES - GENERAL
PAINLEVEL_OUTOF10: 0
PAINLEVEL_OUTOF10: 6
PAINLEVEL_OUTOF10: 0
PAINLEVEL_OUTOF10: 0

## 2019-12-28 NOTE — ED NOTES
Not doing stroke robot assessment d/t neurologist at [facility] stated it was not needed.      Dolores Campbell RN  12/27/19 1911

## 2019-12-28 NOTE — PROGRESS NOTES
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      Impressions: Natalya Olivo was seen for a bedside swallowing evaluation after being admitted to [facility] with generalized weakness. Pt was alert and cooperative throughout assessment. Relevant medical hx includes CVA with residual right-sided weakness, hypertension and dementia. Pt and pt's daughter at bedside deny that she has had any difficulty swallowing PTA. Pt was positioned upright in bed and presented with a clear vocal quality and strong volitional cough. Oral mechanism examination indicated right-sided labial asymmetry with adequate strength and coordination. PO trials of puree, soft/regular solids and thin liquids by cup/straw sips were given. Mild oral dysphagia was observed characterized by prolonged mastication with adequate oral clearance. Suspect mild pharyngeal dysphagia characterized by delayed swallow initiation and adequate laryngeal elevation. Clear vocal quality and 0 overt s/s of aspiration were observed with all PO trials given. Recommend continue regular diet/thin liquids with aspiration precautions. ST will continue to follow Natalya Olivo for diet tolerance monitoring x1-2.       ONSET DATE: this admission     Date of Eval: 2019  Evaluating Therapist: Yang Gutierrez    Current Diet level:  Current Diet : Regular  Current Liquid Diet : Thin      Primary Complaint  Patient Complaint: weakness     Pain:  Pain Assessment  Pain Assessment: 0-10  Pain Level: 0  Patient's Stated Pain Goal: No pain    Reason for Referral  Ryan Valero was referred for a bedside swallow evaluation to assess the efficiency of her swallow function, identify signs and symptoms of aspiration and make recommendations regarding safe dietary consistencies, effective compensatory strategies, and safe eating environment. Impression  Dysphagia Diagnosis: Mild oral stage dysphagia;Mild pharyngeal stage dysphagia  Dysphagia Outcome Severity Scale: Level 5: Mild dysphagia- Distant supervision. May need one diet consistency restricted     Treatment Plan  Requires SLP Intervention: Yes  Duration/Frequency of Treatment: x1-2 weekly / LOS or unt goals are met   D/C Recommendations: To be determined       Recommended Diet and Intervention  Diet Solids Recommendation: Regular  Liquid Consistency Recommendation: Thin  Recommended Form of Meds: PO     Therapeutic Interventions: Diet tolerance monitoring;Patient/Family education    Compensatory Swallowing Strategies  Compensatory Swallowing Strategies: Eat/Feed slowly; Small bites/sips;Upright as possible for all oral intake    Treatment/Goals  Short-term Goals  Timeframe for Short-term Goals: LOS or until goals are met   Goal 1: Pt will tolerate regular diet/thin liquid without clinical evidence of aspiration 100%  Goal 2: Pt/caregivers will demonstrate comprehension of recommendations/POC    General  Chart Reviewed: Yes  Behavior/Cognition: Alert; Cooperative;Pleasant mood  Respiratory Status: Room air  O2 Device: None (Room air)  Communication Observation: Functional  Follows Directions: Complex  Dentition: Adequate  Patient Positioning: Upright in bed  Baseline Vocal Quality: Normal  Volitional Cough: Strong  Prior Dysphagia History: Pt and pt's daughter at bedside deny hx of dysphagia  Consistencies Administered: Reg solid; Dysphagia Pureed (Dysphagia I); Dysphagia Minced and Moist (Dysphagia II); Thin - teaspoon; Thin - cup; Thin - straw           Vision/Hearing  Vision  Vision: Within Functional Limits  Hearing  Hearing: Within functional limits    Oral Motor Deficits  Oral/Motor  Oral Motor: Exceptions to [facility]  Labial Symmetry: Abnormal symmetry right    Oral Phase Dysfunction  Oral Phase  Oral Phase: Exceptions  Oral Phase Dysfunction  Impaired Mastication: Reg Solid     Indicators of Pharyngeal Phase Dysfunction   Pharyngeal Phase  Pharyngeal Phase: Exceptions  Indicators of Pharyngeal Phase Dysfunction  Delayed Swallow: All    Prognosis  Prognosis  Prognosis for safe diet advancement: good  Barriers to reach goals: age  Individuals consulted  Consulted and agree with results and recommendations: Patient    Education  Patient Education Response: Verbalizes understanding  Safety Devices in place: Yes  Type of devices:  All fall risk precautions in place    G-Code            Therapy Time  SLP Individual Minutes  Time In: 0945  Time Out: [address]  Minutes: [address], [facility]-SLP, 12/28/2019

## 2019-12-28 NOTE — ED NOTES
[address] called informed ready to give report,informed she will tell the nurse to come down for bedside report d/t patient being a stroke alert     Ricardo Garcia, RN  12/27/19 9913

## 2019-12-28 NOTE — ED NOTES
Dr Elias Martinez on phone with [facility].   Stroke neurologist     Fernanda Rodriguez RN  12/27/19 [id number]

## 2019-12-28 NOTE — CONSULTS
Jenn Yanez MD.  Section of General Neurology - Adult  Consult Note        Reason for Consult:    Requesting Physician:  No referring provider defined for this encounter. Thank you for your kind referral.    CHIEF COMPLAINT:  dizziness         HISTORY OF PRESENT ILLNESS:              The patient is a 80 y.o. 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 She was anxious and tachypneic which resolved. She has been on keflex for RLE cellulitis and has been mildly improving. [facility] called for stroke alert and not a candidate for TPA. Daughter states her dyarthria is worseas noted above. Has pacer for SSS. pts Son states she has short term memory difficulty x 2 yrs. pts Son also states she has recurrent syncopy x 1 year. Son denies GTC activity ur fecal inc or tongue biting. Past Medical History:        Diagnosis Date    Arthritis     Cerebral artery occlusion with cerebral infarction ([address]     Hyperlipidemia     Hypertension     Impaction of the bowels ([address]     Pacemaker 6/9/16    Medtronic Dual chamber implanted per Fayne Dotter for Asystole for 6 seconds (SSS & intermittent high grade AVB).  SSS (sick sinus syndrome) ([address] 6/16     Past Surgical History:        Procedure Laterality Date    APPENDECTOMY      BACK SURGERY      denervation x2    COLONOSCOPY      EYE SURGERY Bilateral     cataract removal    HIP SURGERY      JOINT REPLACEMENT      R hip, R knee replacement    KNEE SURGERY      PACEMAKER INSERTION  6/9/16    Medtronic Dual-Chamber pacemaker per Fayne Dotter.     PACEMAKER PLACEMENT      TONSILLECTOMY       Current Medications:   Current Facility-Administered Medications: donepezil (ARICEPT) tablet 5 mg, 5 mg, Oral, Nightly  acetaminophen (TYLENOL) tablet 650 mg, 650 kg/m²      General:  Awake, alert, oriented X 2. Well developed, well nourished, well groomed. No apparent distress. HEENT:  Normocephalic, atraumatic. Pupils equal, round, reactive to light. No scleral icterus. No conjunctival injection. Normal lips, teeth, and gums. No nasal discharge. Neck:  Supple  Heart:  RRR, no murmurs, gallops, rubs  Lungs:  CTA bilaterally, bilat symmetrical expansion, no wheeze, rales, or rhonchi  Abdomen: Bowel sounds present, soft, nontender, no masses, no organomegaly, no peritoneal signs  Extremities:  No clubbing, cyanosis, or edema  Skin:  Warm and dry, no open lesions or rash  Breast: deferred  Rectal: deferred  Genitalia:  deferred    NEUROLOGICAL EXAM  ---------------------------------    Mental Status Exam:             Alert and oriented times two,follows commands,speech and language oriented to person place not to year month or other MMSE testing. Cranial Ugljgw-WH-LJD Intact.         Cranial nerve II           Visual acuity:  normal                 Cranial nerve III           Pupils:  equal, round, reactive to light      Cranial nerves III, IV, VI           Extraocular Movements: intact      Cranial nerve V           Facial sensation:  intact      Cranial nerve VII           Facial strength: intact      Cranial nerve VIII           Hearing:  intact      Cranial nerve IX           Palate:  intact      Cranial nerve XI         Shoulder shrug:  intact      Cranial nerve XII          Tongue movement:  normal    Motor:    Drift:  absent  Motor exam is symmetrical 5 out of 5 all extremities bilaterally  Tone:  normal  Abnormal Movements:  Absent    DTRs-2+ biceps,triceps,brachioradialis,knee jerks and ankle jerks bilaterally symmetrical.  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 Discussed dx prognosis meds side effects and above with pt and answered all questions. Joelle Galvan MD  BOARD CERTIFIED-NEUROLOGY.

## 2019-12-28 NOTE — PROGRESS NOTES
Hospitalist Progress Note      Name:  Washington Pavon /Age/Sex: 1921  (80 y.o. female)   MRN & CSN:  [id number] & [id number] Admission Date/Time: 2019  6:33 PM   Location:  [address] [place] [address] PCP: Snehal Simpson MD         Hospital Day: 2    History of Present Illness:     Chief Complaint: Washington Pavon is a 80 y.o.  female  who presents with slurred speech    The patient seen and examined AM. Daughter and son at bed side, they say she is better. She denies any chest pain or SOB.     Ten point ROS reviewed negative, unless as noted above    Objective:   No intake or output data in the 24 hours ending 19 1556   Vitals:   Vitals:    19 1030   BP: (!) 163/61   Pulse: 79   Resp: 24   Temp: 97.7 °F (36.5 °C)   SpO2:      Physical Exam:   General Appearance: alert, awake  Cardiovascular: normal rate, regular rhythm, normal S1 and S2, no murmurs, rubs, clicks, or gallops, distal pulses intact, no carotid bruits, no JVD  Pulmonary/Chest: clear to auscultation bilaterally- no wheezes, rales or rhonchi, normal air movement, no respiratory distress  Abdomen: soft, non-tender, non-distended, normal bowel sounds, no masses   Extremities: no cyanosis, clubbing or edema, pulse   Skin: warm and dry, no rash or erythema  Head: normocephalic and atraumatic  Eyes: pupils equal, round, and reactive to light  Neck: supple and non-tender without mass, no thyromegaly   Musculoskeletal: normal range of motion, no joint swelling, deformity or tenderness  Neurological:  Strength 5/5 upper and lower extremities, cranial nerves grossly intact, Non focal    Medications:   Medications:    donepezil  5 mg Oral Nightly    folic acid-pyridoxine-cyancobalamin  1 tablet Oral Daily    aspirin  81 mg Oral Daily    clopidogrel  75 mg Oral Daily    hydrALAZINE  25 mg Oral BID    labetalol  100 mg Oral BID    memantine  5 mg Oral Dinner    pantoprazole  40 mg Oral QAM AC    polycarbophil  625 mg Oral Daily    polyethylene hip arthroplasty with no evident complication. Diffuse osseous demineralization. No obvious acute fracture. Native joints maintain anatomic alignment. Nonobstructive bowel gas pattern with a minimal to mild stool volume. Ct Head Wo Contrast    Result Date: 12/27/2019  EXAMINATION: CT OF THE HEAD WITHOUT CONTRAST  12/27/2019 6:40 pm TECHNIQUE: CT of the head was performed without the administration of intravenous contrast. Dose modulation, iterative reconstruction, and/or weight based adjustment of the mA/kV was utilized to reduce the radiation dose to as low as reasonably achievable. COMPARISON: CT head December 18, 2019 HISTORY: ORDERING SYSTEM PROVIDED HISTORY: Dizziness TECHNOLOGIST PROVIDED HISTORY: Has a \"code stroke\" or \"stroke alert\" been called? ->Yes Reason for exam:->Dizziness Reason for Exam: stroke alert,, weakness, dizzy Acuity: Acute Type of Exam: Initial Additional signs and symptoms: none Relevant Medical/Surgical History: hx cva, pacer FINDINGS: BRAIN/VENTRICLES: There is no acute intracranial hemorrhage, mass effect or midline shift. No abnormal extra-axial fluid collection. Redemonstration of remote bilateral basal ganglia lacunar infarctions. The gray-white differentiation is otherwise grossly maintained without evidence of an acute infarct. There is no evidence of hydrocephalus. There is mild periventricular and subcortical white matter hypoattenuation most consistent with microvascular ischemic changes. Atherosclerotic changes of the intracranial vasculature. There is mild age appropriate global cerebral atrophy. ORBITS: The visualized portion of the orbits demonstrate no acute abnormality. SINUSES: The visualized paranasal sinuses and mastoid air cells demonstrate no acute abnormality. SOFT TISSUES/SKULL:  No acute abnormality of the visualized skull or soft tissues. 1. No acute intracranial abnormality identified.  2. Redemonstration of chronic bilateral basal ganglia lacunar infarctions. Next chronic microvascular ischemic changes and global cerebral atrophy. Findings were discussed with TG VINSON at 6:53 pm on 12/27/2019. Xr Chest Portable    Result Date: 12/27/2019  EXAMINATION: ONE XRAY VIEW OF THE CHEST 12/27/2019 7:13 pm COMPARISON: Chest x-ray September 5, 2019 and August 18, 2019 HISTORY: ORDERING SYSTEM PROVIDED HISTORY: chest pain TECHNOLOGIST PROVIDED HISTORY: Reason for exam:->chest pain Reason for Exam: chest pain Acuity: Unknown Type of Exam: Initial Additional signs and symptoms: stroke alert Relevant Medical/Surgical History: pacer, htn FINDINGS: Cardiac silhouette is enlarged but stable. Atherosclerotic changes of the aorta. Left-sided cardiac pacer device remains in place. Chronic interstitial changes of the lungs. Blunting of the bilateral costophrenic angles with mild bibasilar opacities present. Findings favored to reflect small effusions and atelectasis. Scarring noted at the bilateral lung apices. No pneumothorax identified. Osseous structures appear intact. The bones are osteopenic. 1. Findings likely reflecting small effusions and bibasilar atelectasis. 2. Chronic underlying interstitial changes throughout the lungs. Vl Dup Lower Extremity Venous Bilateral    Result Date: 12/27/2019  EXAMINATION: DUPLEX VENOUS ULTRASOUND OF THE BILATERAL LOWER EXTREMITIES, 12/27/2019 7:48 pm TECHNIQUE: Duplex ultrasound and Doppler images were obtained of the bilateral lower extremities COMPARISON: None. HISTORY: ORDERING SYSTEM PROVIDED HISTORY: Leg pain, redness, swelling TECHNOLOGIST PROVIDED HISTORY: Reason for exam:->Leg pain, redness, swelling Reason for Exam: Rt leg calf very red and painful Acuity: Acute Type of Exam: Initial FINDINGS: The visualized veins of the bilateral lower extremities are patent and free of echogenic thrombus. The veins are normally compressible and have normal phasic flow.      No evidence of DVT in either lower extremity. Cta Neck W Contrast    Result Date: 12/27/2019  EXAMINATION: CTA OF THE NECK; CTA OF THE HEAD WITH CONTRAST 12/27/2019 6:42 pm; 12/27/2019 6:44 pm: TECHNIQUE: CTA of the neck was performed with the administration of intravenous contrast. Multiplanar reformatted images are provided for review. MIP images are provided for review. Stenosis of the internal carotid arteries measured using NASCET criteria. Dose modulation, iterative reconstruction, and/or weight based adjustment of the mA/kV was utilized to reduce the radiation dose to as low as reasonably achievable.; CTA of the head/brain was performed with the administration of intravenous contrast. Multiplanar reformatted images are provided for review. MIP images are provided for review. Dose modulation, iterative reconstruction, and/or weight based adjustment of the mA/kV was utilized to reduce the radiation dose to as low as reasonably achievable. COMPARISON: CT head 12/27/2019, MRA brain 08/19/2019 HISTORY: ORDERING SYSTEM PROVIDED HISTORY: Dizziness TECHNOLOGIST PROVIDED HISTORY: Has a \"code stroke\" or \"stroke alert\" been called? ->Yes Reason for exam:->Dizziness Reason for Exam: stroke alert, dizzy weakness Type of Exam: Initial Additional signs and symptoms: 80 cc isovue 370 Relevant Medical/Surgical History: hx/ cva,; ORDERING SYSTEM PROVIDED HISTORY: Dizziness TECHNOLOGIST PROVIDED HISTORY: Has a \"code stroke\" or \"stroke alert\" been called? ->Yes Reason for exam:->Dizziness Reason for Exam: stroke alert,, dizzy, weakness Acuity: Acute Type of Exam: Initial Additional signs and symptoms: 80 cc isovue 370/// cta neck Relevant Medical/Surgical History: hx cva, pacer FINDINGS: CTA NECK: AORTIC ARCH/ARCH VESSELS: No dissection or arterial injury. No significant stenosis of the brachiocephalic or subclavian arteries. CAROTID ARTERIES: Moderate calcific plaque identified involving both the bifurcations.   40% narrowing right proximal ICA and 50% narrowing left proximal ICA per NASCET criteria no dissection, arterial injury, or hemodynamically significant stenosis by NASCET criteria. VERTEBRAL ARTERIES: 50% narrowing at the ostium of the left vertebral artery. Otherwise common no dissection, arterial injury, or significant stenosis. SOFT TISSUES: Interstitial thickening identified both lower lobes worrisome for interstitial edema. Patchy opacity identified involving right upper lobe with some pleural base calcifications noted. No cervical or superior mediastinal lymphadenopathy. The larynx and pharynx are unremarkable. No acute abnormality of the salivary and thyroid glands. BONES: Multilevel degenerate changes of the cervical spine. CTA HEAD: ANTERIOR CIRCULATION: Moderate tandem areas of stenosis identified involving both ACAs without definite severe occlusion. No significant stenosis of the intracranial internal carotid,  or middle cerebral arteries. No aneurysm. POSTERIOR CIRCULATION: Fetal origin PCAs. There is occlusion involving the mid basilar artery. Moderate stenosis identified involving the proximal basilar artery. Mild-to-moderate plaque identified involving both vertebral arteries. BRAIN: No mass effect or midline shift. No extra-axial fluid collection. The gray-white differentiation is maintained. Chronic lacune infarct identified left basal ganglia. Generalized involutional change. Chronic small vessel ischemic disease. Fetal origin both PCAs. Mid basilar occlusion. Otherwise moderate atherosclerotic disease identified involving the posterior circulation notably involving the proximal basilar artery and intradural vertebral arteries Moderate tandem areas of stenosis involving the ACAs. Moderate plaque both proximal ICAs without hemodynamic stenosis or occlusion.      Cta Head W Contrast    Result Date: 12/27/2019  EXAMINATION: CTA OF THE NECK; CTA OF THE HEAD WITH CONTRAST 12/27/2019 6:42 pm; 12/27/2019 6:44 pm: TECHNIQUE: CTA of the neck was performed with the administration of intravenous contrast. Multiplanar reformatted images are provided for review. MIP images are provided for review. Stenosis of the internal carotid arteries measured using NASCET criteria. Dose modulation, iterative reconstruction, and/or weight based adjustment of the mA/kV was utilized to reduce the radiation dose to as low as reasonably achievable.; CTA of the head/brain was performed with the administration of intravenous contrast. Multiplanar reformatted images are provided for review. MIP images are provided for review. Dose modulation, iterative reconstruction, and/or weight based adjustment of the mA/kV was utilized to reduce the radiation dose to as low as reasonably achievable. COMPARISON: CT head 12/27/2019, MRA brain 08/19/2019 HISTORY: ORDERING SYSTEM PROVIDED HISTORY: Dizziness TECHNOLOGIST PROVIDED HISTORY: Has a \"code stroke\" or \"stroke alert\" been called? ->Yes Reason for exam:->Dizziness Reason for Exam: stroke alert, dizzy weakness Type of Exam: Initial Additional signs and symptoms: 80 cc isovue 370 Relevant Medical/Surgical History: hx/ cva,; ORDERING SYSTEM PROVIDED HISTORY: Dizziness TECHNOLOGIST PROVIDED HISTORY: Has a \"code stroke\" or \"stroke alert\" been called? ->Yes Reason for exam:->Dizziness Reason for Exam: stroke alert,, dizzy, weakness Acuity: Acute Type of Exam: Initial Additional signs and symptoms: 80 cc isovue 370/// cta neck Relevant Medical/Surgical History: hx cva, pacer FINDINGS: CTA NECK: AORTIC ARCH/ARCH VESSELS: No dissection or arterial injury. No significant stenosis of the brachiocephalic or subclavian arteries. CAROTID ARTERIES: Moderate calcific plaque identified involving both the bifurcations. 40% narrowing right proximal ICA and 50% narrowing left proximal ICA per NASCET criteria no dissection, arterial injury, or hemodynamically significant stenosis by NASCET criteria.  VERTEBRAL ARTERIES: 50% narrowing at the ostium of the left vertebral artery. Otherwise common no dissection, arterial injury, or significant stenosis. SOFT TISSUES: Interstitial thickening identified both lower lobes worrisome for interstitial edema. Patchy opacity identified involving right upper lobe with some pleural base calcifications noted. No cervical or superior mediastinal lymphadenopathy. The larynx and pharynx are unremarkable. No acute abnormality of the salivary and thyroid glands. BONES: Multilevel degenerate changes of the cervical spine. CTA HEAD: ANTERIOR CIRCULATION: Moderate tandem areas of stenosis identified involving both ACAs without definite severe occlusion. No significant stenosis of the intracranial internal carotid,  or middle cerebral arteries. No aneurysm. POSTERIOR CIRCULATION: Fetal origin PCAs. There is occlusion involving the mid basilar artery. Moderate stenosis identified involving the proximal basilar artery. Mild-to-moderate plaque identified involving both vertebral arteries. BRAIN: No mass effect or midline shift. No extra-axial fluid collection. The gray-white differentiation is maintained. Chronic lacune infarct identified left basal ganglia. Generalized involutional change. Chronic small vessel ischemic disease. Fetal origin both PCAs. Mid basilar occlusion. Otherwise moderate atherosclerotic disease identified involving the posterior circulation notably involving the proximal basilar artery and intradural vertebral arteries Moderate tandem areas of stenosis involving the ACAs. Moderate plaque both proximal ICAs without hemodynamic stenosis or occlusion.        Assessment and Plan:   Brain Snellen is a 80 y.o.  female  who presents with <principal problem not specified>    Metabolic encephalopathy likely from TIA with history of CVA  CTA head and neck with mild basilar occlusion also vascular atherosclerotic disease  MRI brain pending  Neurology following, appreciated recommendations  Says Continue Aspirin and Plavix  B12, folate and TSH ordered by neuro    RLE Cellulitis  -doppler neg for DVT  -Continue rocephin and vanc    Dementia  Continue Nemenda    HTN  -hydralazine,imdur, labetalol  -IV hydralzine prn     Hyponatremia, Resolved  -Likely from poor oral intake      Diet DIET CARDIAC;   DVT Prophylaxis [x] Lovenox, []  Heparin, [] SCDs, [] Ambulation   GI Prophylaxis [x] PPI,  [] H2 Blocker,  [] Carafate,  [] Diet/Tube Feeds   Code Status Full Code   Disposition Patient requires continued admission due to above condition         Electronically signed by Lakisha Camilo MD on 12/28/2019 at 3:56 PM

## 2019-12-28 NOTE — PROGRESS NOTES
[address] [facility]  consulted by Dr. Aubree Jimenez for monitoring and adjustment. Indication for treatment: cellulitis  Goal trough: 10-15 mcg/mL  Other Antimicrobials: ceftriaxone     Pertinent Laboratory Values:   Temp Readings from Last 3 Encounters:   12/27/19 99 °F (37.2 °C) (Oral)   12/18/19 97.6 °F (36.4 °C) (Axillary)   09/10/19 97.8 °F (36.6 °C) (Oral)     Recent Labs     12/27/19  1835   WBC 7.0     Recent Labs     12/27/19  1835   BUN 29*   CREATININE 1.5*     Estimated Creatinine Clearance: 20 mL/min (A) (based on SCr of 1.5 mg/dL (H)). No intake or output data in the 24 hours ending 12/28/19 0151    Pertinent Cultures:  Date    Source    Results  N/A   N/A    N/A      Vancomycin level:   TROUGH:  No results for input(s): VANCOTROUGH in the last 72 hours. RANDOM:  No results for input(s): VANCORANDOM in the last 72 hours. Assessment:  WBC and temperature: WBC is WNL, pt has low grade fever  SCr, BUN, and urine output: Scr = 1.5; no output data  Day(s) of therapy:# 1  Vancomycin level: To be collected    Plan:  Patient received vancomycin 1000 mg x 1 in the ED  Due to patient's KEYONA, pharmacy will dose vancomycin intermittently based on levels. Pharmacy will continue to monitor patient and adjust therapy as indicated    RANDOM VANCOMYCIN LEVEL SCHEDULED FOR 12/19/2019 @0600    Thank you for the consult.   Ninoska Rossi [facility]  12/28/2019 1:51 AM

## 2019-12-28 NOTE — H&P
Department of Internal Medicine  Hospitalist  Attending History and Physical      CHIEF COMPLAINT:  Slurred speech    Reason for Admission: CVA. [facility] [place]    History Obtained From:  patient, family member - daughter    HISTORY OF PRESENT ILLNESS:      The patient is a 80 y.o. female with significant past medical history of CVA 8/2019 with right sided weakness, dysarthria, dementia, HTN presents as today she had mild dizziness and then baseline  slurred speech which worsened today associated with generalized weakness. She was anxious and tachypneic which resolved. She has been on keflex for RLE cellulitis and has been mildly improving. [facility] called for stroke alert and not a candidate for TPA. Daughter states her dyarthria is worseas noted above. Has pacer for SSS. Past Medical History:        Diagnosis Date    Arthritis     Cerebral artery occlusion with cerebral infarction ([address]     Hyperlipidemia     Hypertension     Impaction of the bowels ([address]     Pacemaker 6/9/16    Medtronic Dual chamber implanted per Keesha Lovett for Asystole for 6 seconds (SSS & intermittent high grade AVB).  SSS (sick sinus syndrome) ([address] 6/16     Past Surgical History:        Procedure Laterality Date    APPENDECTOMY      BACK SURGERY      denervation x2    COLONOSCOPY      EYE SURGERY Bilateral     cataract removal    HIP SURGERY      JOINT REPLACEMENT      R hip, R knee replacement    KNEE SURGERY      PACEMAKER INSERTION  6/9/16    Medtronic Dual-Chamber pacemaker per Keesha Lovett.  PACEMAKER PLACEMENT      TONSILLECTOMY       Immunizations:                Medications Prior to Admission:    No current facility-administered medications on file prior to encounter.       Current Outpatient Medications on File Prior to Encounter   Medication Sig Dispense Refill    hydrALAZINE (APRESOLINE) 25 MG tablet Take 25 mg by mouth 2 times daily      lisinopril (PRINIVIL;ZESTRIL) 40 MG tablet Take 40 mg by mouth 2 times daily      confused. SKIN:  no bruising or bleeding. Ertythma and tenderness right >Left leg    DATA:  CT head  No acute intracranial abnormality identified. 2. Redemonstration of chronic bilateral basal ganglia lacunar infarctions. Next chronic microvascular ischemic changes and global cerebral atrophy   CTA head and neck  Fetal origin both PCAs.  Mid basilar occlusion.       Otherwise moderate atherosclerotic disease identified involving the posterior   circulation notably involving the proximal basilar artery and intradural   vertebral arteries       Moderate tandem areas of stenosis involving the ACAs.     Moderate plaque both proximal ICAs without hemodynamic stenosis or occlusion.       CXR  Findings likely reflecting small effusions and bibasilar atelectasis.    2. Chronic underlying interstitial changes throughout the lungs.           Doppler LE  No evidence of DVT in either lower extremity.           EKG 91 SR      ASSESSMENT AND PLAN:    Metabolic encephalopathy with Slurred speech r/o TIA, CVA  -not a candidate for TPA  ASA, plavix, statin  -MRI brain(check with EPS if MRI compatible) and echo  -consult neuro  HTN  -hydralazine,imdur, labetalol  -IV hydralzine prn  Hyponatremia with acute kidney injury  -IVF  -hold thiazide and ACE  Dementia  -namenda  RLE Cellulitis  -doppler neg for DVT  -rocephin and vanc  Elevated trop  -trend  DVT prophylaxis  -lovenox

## 2019-12-28 NOTE — ED TRIAGE NOTES
Patient from [facility] brought in by EMS for increased weakness and increased blood pressure

## 2019-12-28 NOTE — CONSULTS
at trunk, cues for sequencing, demonstrates good LE management, HOB elevated 30 degrees. Transfers: STS from chair x3 with RW, mod A d/t retropulsion and ankle PF, patient unable to place R foot flat d/t pain. Cues for anterior weight shift and LE positioning, scooting to edge of seat. Gait: able to take x3 side steps (4ft) at EOB to transfer to chair with RW, CGA to min A for balance and AD negotiation. Standing balance: CGA for standing balance at sink for oral care. Educated patient and family on d/c recommendations. Safety: patient left in chair with chair alarm, call light within reach, RN notified, gait belt used. Assessment:  Patient is a 79 yo female who presents with slurred speech and weakness with mild dizziness. Patient demonstrates impaired mobility which appears to be below baseline function. Patient would benefit from skilled PT services and d/c to SNF vs return to assisted living with increased assist (24/7) (concern for high fall risk if patient attempts mobilizing without assist)    Complexity: Moderate  Prognosis: Good, no significant barriers to participation at this time. Plan Times per week: 3/week, 1 week,   Discharge Recommendations: Subacute/Skilled Nursing Facility  Equipment: patient has necessary equipment. Goals:  Short term goals  Time Frame for Short term goals: 1 week  Short term goal 1: Patient will perform supine to sit mod I. Short term goal 2: Patient will perform STS with min A and RW. Short term goal 3: Patient will perform stand pivot transfer with min A and RW. Treatment plan:  Bed mobility, transfers, balance, gait, TA, TX. Recommendations for NURSING mobility: stand pivot with RW and gait belt.      Time:   Time in/out: 6363-6240  Time in/out: 6026-2041  Timed treatment minutes: 23  Total time: 36    Electronically signed by:    Masha Craig, PT  12/28/2019, 2:09 PM

## 2019-12-29 LAB
DOSE AMOUNT: NORMAL
DOSE TIME: NORMAL
FOLATE: 10.7 NG/ML (ref 3.1–17.5)
TROPONIN T: 0.02 NG/ML
TSH HIGH SENSITIVITY: 3.7 UIU/ML (ref 0.27–4.2)
VANCOMYCIN RANDOM: 5.9 UG/ML
VANCOMYCIN RANDOM: NORMAL UG/ML
VITAMIN B-12: 984.7 PG/ML (ref 211–911)

## 2019-12-29 PROCEDURE — 1200000000 HC SEMI PRIVATE

## 2019-12-29 PROCEDURE — 2580000003 HC RX 258: Performed by: HOSPITALIST

## 2019-12-29 PROCEDURE — 84484 ASSAY OF TROPONIN QUANT: CPT

## 2019-12-29 PROCEDURE — 6370000000 HC RX 637 (ALT 250 FOR IP): Performed by: HOSPITALIST

## 2019-12-29 PROCEDURE — 6360000002 HC RX W HCPCS: Performed by: HOSPITALIST

## 2019-12-29 PROCEDURE — 82746 ASSAY OF FOLIC ACID SERUM: CPT

## 2019-12-29 PROCEDURE — 6370000000 HC RX 637 (ALT 250 FOR IP): Performed by: PSYCHIATRY & NEUROLOGY

## 2019-12-29 PROCEDURE — 80202 ASSAY OF VANCOMYCIN: CPT

## 2019-12-29 PROCEDURE — 82607 VITAMIN B-12: CPT

## 2019-12-29 PROCEDURE — 84443 ASSAY THYROID STIM HORMONE: CPT

## 2019-12-29 PROCEDURE — 94761 N-INVAS EAR/PLS OXIMETRY MLT: CPT

## 2019-12-29 PROCEDURE — 36415 COLL VENOUS BLD VENIPUNCTURE: CPT

## 2019-12-29 RX ADMIN — ATORVASTATIN CALCIUM 80 MG: 40 TABLET, FILM COATED ORAL at 20:13

## 2019-12-29 RX ADMIN — PANTOPRAZOLE SODIUM 40 MG: 40 TABLET, DELAYED RELEASE ORAL at 06:07

## 2019-12-29 RX ADMIN — CALCIUM POLYCARBOPHIL 625 MG TABLET 625 MG: at 10:47

## 2019-12-29 RX ADMIN — Medication 1 TABLET: at 10:47

## 2019-12-29 RX ADMIN — ENOXAPARIN SODIUM 30 MG: 30 INJECTION SUBCUTANEOUS at 10:47

## 2019-12-29 RX ADMIN — MAGNESIUM HYDROXIDE 30 ML: 400 SUSPENSION ORAL at 16:58

## 2019-12-29 RX ADMIN — CLOPIDOGREL BISULFATE 75 MG: 75 TABLET ORAL at 10:47

## 2019-12-29 RX ADMIN — LABETALOL HYDROCHLORIDE 100 MG: 100 TABLET, FILM COATED ORAL at 20:13

## 2019-12-29 RX ADMIN — SODIUM CHLORIDE, PRESERVATIVE FREE 10 ML: 5 INJECTION INTRAVENOUS at 10:50

## 2019-12-29 RX ADMIN — MEMANTINE HYDROCHLORIDE 5 MG: 5 TABLET ORAL at 16:57

## 2019-12-29 RX ADMIN — POLYETHYLENE GLYCOL (3350) 17 G: 17 POWDER, FOR SOLUTION ORAL at 10:47

## 2019-12-29 RX ADMIN — VANCOMYCIN HYDROCHLORIDE 1250 MG: 5 INJECTION, POWDER, LYOPHILIZED, FOR SOLUTION INTRAVENOUS at 12:37

## 2019-12-29 RX ADMIN — LABETALOL HYDROCHLORIDE 100 MG: 100 TABLET, FILM COATED ORAL at 10:47

## 2019-12-29 RX ADMIN — ASPIRIN 81 MG 81 MG: 81 TABLET ORAL at 10:47

## 2019-12-29 RX ADMIN — SODIUM CHLORIDE: 9 INJECTION, SOLUTION INTRAVENOUS at 20:13

## 2019-12-29 ASSESSMENT — PAIN SCALES - GENERAL
PAINLEVEL_OUTOF10: 0
PAINLEVEL_OUTOF10: 0

## 2019-12-29 NOTE — PROGRESS NOTES
polyethylene glycol  17 g Oral Daily    sodium chloride flush  10 mL Intravenous 2 times per day    enoxaparin  30 mg Subcutaneous Daily    atorvastatin  80 mg Oral Nightly    cefTRIAXone (ROCEPHIN) IV  1 g Intravenous Q24H    vancomycin (VANCOCIN) intermittent dosing (placeholder)   Other RX Placeholder      Infusions:    sodium chloride 50 mL/hr at 12/28/19 2016     PRN Meds: acetaminophen, 650 mg, Q6H PRN  sodium chloride flush, 10 mL, PRN  magnesium hydroxide, 30 mL, Daily PRN  ondansetron, 4 mg, Q6H PRN  hydrALAZINE, 10 mg, Q6H PRN            Pertinent New Labs & Imaging Studies     CBC:   Lab Results   Component Value Date    WBC 7.4 12/28/2019    RBC 3.89 12/28/2019    HGB 11.2 12/28/2019    HCT 35.3 12/28/2019    MCV 90.7 12/28/2019    MCH 28.8 12/28/2019    MCHC 31.7 12/28/2019    RDW 13.7 12/28/2019     12/28/2019    MPV 11.3 12/28/2019     BMP:    Lab Results   Component Value Date     12/28/2019    K 3.5 12/28/2019     12/28/2019    CO2 23 12/28/2019    BUN 30 12/28/2019    LABALBU 3.8 12/27/2019    CREATININE 1.2 12/28/2019    CALCIUM 8.8 12/28/2019    GFRAA 50 12/28/2019    LABGLOM 41 12/28/2019    GLUCOSE 108 12/28/2019     Xr Abdomen (kub) (single Ap View)    Result Date: 12/28/2019  EXAMINATION: ONE SUPINE XRAY VIEW(S) OF THE ABDOMEN 12/28/2019 2:45 pm COMPARISON: Abdomen radiograph 09/05/2019 HISTORY: ORDERING SYSTEM PROVIDED HISTORY: Left lower quadrant pain, constipation TECHNOLOGIST PROVIDED HISTORY: Reason for exam:->Left lower quadrant pain, constipation Reason for Exam: Left lower quadrant pain, constipation Acuity: Acute Type of Exam: Initial FINDINGS: Clear included lung bases. Interstitial prominence likely due to chronic changes. Nonobstructive bowel gas pattern. Minimal to mild stool. No abnormal abdominal calcifications. Patchy atherosclerotic calcifications in the pelvis. Excreted intravenous contrast in the urinary bladder.   Partially included changes of right total hip arthroplasty with no evident complication. Diffuse osseous demineralization. No obvious acute fracture. Native joints maintain anatomic alignment. Nonobstructive bowel gas pattern with a minimal to mild stool volume. Ct Head Wo Contrast    Result Date: 12/27/2019  EXAMINATION: CT OF THE HEAD WITHOUT CONTRAST  12/27/2019 6:40 pm TECHNIQUE: CT of the head was performed without the administration of intravenous contrast. Dose modulation, iterative reconstruction, and/or weight based adjustment of the mA/kV was utilized to reduce the radiation dose to as low as reasonably achievable. COMPARISON: CT head December 18, 2019 HISTORY: ORDERING SYSTEM PROVIDED HISTORY: Dizziness TECHNOLOGIST PROVIDED HISTORY: Has a \"code stroke\" or \"stroke alert\" been called? ->Yes Reason for exam:->Dizziness Reason for Exam: stroke alert,, weakness, dizzy Acuity: Acute Type of Exam: Initial Additional signs and symptoms: none Relevant Medical/Surgical History: hx cva, pacer FINDINGS: BRAIN/VENTRICLES: There is no acute intracranial hemorrhage, mass effect or midline shift. No abnormal extra-axial fluid collection. Redemonstration of remote bilateral basal ganglia lacunar infarctions. The gray-white differentiation is otherwise grossly maintained without evidence of an acute infarct. There is no evidence of hydrocephalus. There is mild periventricular and subcortical white matter hypoattenuation most consistent with microvascular ischemic changes. Atherosclerotic changes of the intracranial vasculature. There is mild age appropriate global cerebral atrophy. ORBITS: The visualized portion of the orbits demonstrate no acute abnormality. SINUSES: The visualized paranasal sinuses and mastoid air cells demonstrate no acute abnormality. SOFT TISSUES/SKULL:  No acute abnormality of the visualized skull or soft tissues. 1. No acute intracranial abnormality identified.  2. Redemonstration of chronic bilateral basal pending, checking with Dr Ratna Rivas to see if okay with pacemaker  Neurology following, appreciated recommendations  Continue Aspirin and Plavix  B12, folate and TSH ordered by neuro    RLE Cellulitis-Improving  -doppler neg for DVT  -Continue rocephin and vanc    Dementia  Continue Nemenda  Discontinue Aricept as she was confused which happened even last time    HTN  -hydralazine,imdur, labetalol  -IV hydralzine prn     Hyponatremia, Resolved  -Likely from poor oral intake      Diet DIET CARDIAC;   DVT Prophylaxis [x] Lovenox, []  Heparin, [] SCDs, [] Ambulation   GI Prophylaxis [x] PPI,  [] H2 Blocker,  [] Carafate,  [] Diet/Tube Feeds   Code Status Full Code   Disposition Patient requires continued admission due to above condition         Electronically signed by Kaylah Wadsworth MD on 12/29/2019 at 12:03 PM

## 2019-12-30 ENCOUNTER — APPOINTMENT (OUTPATIENT)
Dept: MRI IMAGING | Age: 84
DRG: 064 | End: 2019-12-30
Payer: MEDICARE

## 2019-12-30 VITALS
HEART RATE: 79 BPM | HEIGHT: 67 IN | BODY MASS INDEX: 22.29 KG/M2 | OXYGEN SATURATION: 97 % | WEIGHT: 142 LBS | SYSTOLIC BLOOD PRESSURE: 177 MMHG | RESPIRATION RATE: 22 BRPM | TEMPERATURE: 98.9 F | DIASTOLIC BLOOD PRESSURE: 82 MMHG

## 2019-12-30 LAB
ANION GAP SERPL CALCULATED.3IONS-SCNC: 13 MMOL/L (ref 4–16)
BUN BLDV-MCNC: 24 MG/DL (ref 6–23)
CALCIUM SERPL-MCNC: 8.9 MG/DL (ref 8.3–10.6)
CHLORIDE BLD-SCNC: 105 MMOL/L (ref 99–110)
CO2: 24 MMOL/L (ref 21–32)
CREAT SERPL-MCNC: 0.9 MG/DL (ref 0.6–1.1)
CREAT SERPL-MCNC: 1 MG/DL (ref 0.6–1.1)
DOSE AMOUNT: NORMAL
DOSE TIME: NORMAL
GFR AFRICAN AMERICAN: >60 ML/MIN/1.73M2
GFR AFRICAN AMERICAN: >60 ML/MIN/1.73M2
GFR NON-AFRICAN AMERICAN: 51 ML/MIN/1.73M2
GFR NON-AFRICAN AMERICAN: 58 ML/MIN/1.73M2
GLUCOSE BLD-MCNC: 102 MG/DL (ref 70–99)
HCT VFR BLD CALC: 37.3 % (ref 37–47)
HEMOGLOBIN: 11.8 GM/DL (ref 12.5–16)
MCH RBC QN AUTO: 28.9 PG (ref 27–31)
MCHC RBC AUTO-ENTMCNC: 31.6 % (ref 32–36)
MCV RBC AUTO: 91.4 FL (ref 78–100)
PDW BLD-RTO: 13.8 % (ref 11.7–14.9)
PLATELET # BLD: 287 K/CU MM (ref 140–440)
PMV BLD AUTO: 11.6 FL (ref 7.5–11.1)
POTASSIUM SERPL-SCNC: 3.6 MMOL/L (ref 3.5–5.1)
RBC # BLD: 4.08 M/CU MM (ref 4.2–5.4)
SODIUM BLD-SCNC: 142 MMOL/L (ref 135–145)
TROPONIN T: 0.02 NG/ML
VANCOMYCIN RANDOM: 14.3 UG/ML
VANCOMYCIN RANDOM: NORMAL UG/ML
WBC # BLD: 9.2 K/CU MM (ref 4–10.5)

## 2019-12-30 PROCEDURE — 2580000003 HC RX 258: Performed by: HOSPITALIST

## 2019-12-30 PROCEDURE — 6360000002 HC RX W HCPCS: Performed by: HOSPITALIST

## 2019-12-30 PROCEDURE — 6370000000 HC RX 637 (ALT 250 FOR IP): Performed by: HOSPITALIST

## 2019-12-30 PROCEDURE — 84484 ASSAY OF TROPONIN QUANT: CPT

## 2019-12-30 PROCEDURE — 6370000000 HC RX 637 (ALT 250 FOR IP): Performed by: PSYCHIATRY & NEUROLOGY

## 2019-12-30 PROCEDURE — 70551 MRI BRAIN STEM W/O DYE: CPT

## 2019-12-30 PROCEDURE — 93308 TTE F-UP OR LMTD: CPT

## 2019-12-30 PROCEDURE — 85027 COMPLETE CBC AUTOMATED: CPT

## 2019-12-30 PROCEDURE — 97116 GAIT TRAINING THERAPY: CPT

## 2019-12-30 PROCEDURE — 80048 BASIC METABOLIC PNL TOTAL CA: CPT

## 2019-12-30 PROCEDURE — 82565 ASSAY OF CREATININE: CPT

## 2019-12-30 PROCEDURE — 97530 THERAPEUTIC ACTIVITIES: CPT

## 2019-12-30 PROCEDURE — 36415 COLL VENOUS BLD VENIPUNCTURE: CPT

## 2019-12-30 PROCEDURE — 80202 ASSAY OF VANCOMYCIN: CPT

## 2019-12-30 RX ORDER — DOXYCYCLINE HYCLATE 100 MG
100 TABLET ORAL 2 TIMES DAILY
Qty: 24 TABLET | Refills: 0 | Status: SHIPPED | OUTPATIENT
Start: 2019-12-30 | End: 2020-01-11

## 2019-12-30 RX ORDER — ATORVASTATIN CALCIUM 80 MG/1
80 TABLET, FILM COATED ORAL NIGHTLY
Qty: 30 TABLET | Refills: 3 | Status: SHIPPED | OUTPATIENT
Start: 2019-12-30

## 2019-12-30 RX ORDER — B12/LEVOMEFOLATE CALCIUM/B-6 2-1.13-25
1 TABLET ORAL DAILY
Qty: 30 TABLET | Refills: 0 | Status: SHIPPED | OUTPATIENT
Start: 2019-12-31

## 2019-12-30 RX ORDER — MEMANTINE HYDROCHLORIDE 5 MG/1
5 TABLET ORAL 2 TIMES DAILY
Qty: 60 TABLET | Refills: 0 | Status: SHIPPED | OUTPATIENT
Start: 2019-12-30

## 2019-12-30 RX ORDER — CEFDINIR 300 MG/1
300 CAPSULE ORAL 2 TIMES DAILY
Qty: 24 CAPSULE | Refills: 0 | Status: SHIPPED | OUTPATIENT
Start: 2019-12-30 | End: 2020-01-11

## 2019-12-30 RX ORDER — VANCOMYCIN HYDROCHLORIDE 1 G/200ML
1000 INJECTION, SOLUTION INTRAVENOUS ONCE
Status: DISCONTINUED | OUTPATIENT
Start: 2019-12-30 | End: 2019-12-30 | Stop reason: HOSPADM

## 2019-12-30 RX ADMIN — POLYETHYLENE GLYCOL (3350) 17 G: 17 POWDER, FOR SOLUTION ORAL at 08:26

## 2019-12-30 RX ADMIN — ENOXAPARIN SODIUM 30 MG: 30 INJECTION SUBCUTANEOUS at 08:25

## 2019-12-30 RX ADMIN — CALCIUM POLYCARBOPHIL 625 MG TABLET 625 MG: at 08:25

## 2019-12-30 RX ADMIN — Medication 1 TABLET: at 08:25

## 2019-12-30 RX ADMIN — ASPIRIN 81 MG 81 MG: 81 TABLET ORAL at 08:25

## 2019-12-30 RX ADMIN — SODIUM CHLORIDE, PRESERVATIVE FREE 10 ML: 5 INJECTION INTRAVENOUS at 08:33

## 2019-12-30 RX ADMIN — LABETALOL HYDROCHLORIDE 100 MG: 100 TABLET, FILM COATED ORAL at 08:25

## 2019-12-30 RX ADMIN — CEFTRIAXONE 1 G: 1 INJECTION, POWDER, FOR SOLUTION INTRAMUSCULAR; INTRAVENOUS at 13:23

## 2019-12-30 RX ADMIN — PANTOPRAZOLE SODIUM 40 MG: 40 TABLET, DELAYED RELEASE ORAL at 08:25

## 2019-12-30 RX ADMIN — CLOPIDOGREL BISULFATE 75 MG: 75 TABLET ORAL at 08:25

## 2019-12-30 RX ADMIN — HYDRALAZINE HYDROCHLORIDE 10 MG: 20 INJECTION INTRAMUSCULAR; INTRAVENOUS at 03:56

## 2019-12-30 ASSESSMENT — PAIN SCALES - GENERAL: PAINLEVEL_OUTOF10: 0

## 2019-12-30 NOTE — CARE COORDINATION
Patient discharged to [facility] on 12/30/2019    CN met with patient at on 12/30, family at bedside and education provided. STROKE INITIAL ASSESSMENT      What symptoms brought you in to the hospital? Patient came in with mild dizziness, slurred speech which has worsened and generalized weakness    MRI:  Motion artifact degrades the images. Mame Stanley is a very subtle area of   restricted diffusion in the left basal ganglia that likely represents a   subacute area of infarct measuring only a few millimeters.  No other areas of   restricted diffusion are identified.       Cerebral atrophy.  Chronic small vessel ischemic changes. Last Known Well:12/27 1700    Do you have a Neurologist?  Name:    Where do you live:       [] Home       [] Independent Living     [] Assisted Living                  [x] [address]   [] Homeless/Homeless Shelter   [] Group Home    Primary Physician:   [x]Dr Huber   [] None    [] PA/NP   [] [facility] Phys[facility]    Pharmacy:        [] [facility]        [x] [facility]        [] [facility]   [] [facility]      [] [facility]    [] [facility]   [] [facility]              [] Nursing Home    [] [facility]   [] [place]   [] [facility]    [] [employer]   [] [employer] [employer]   [] [facility]   [] Other:          Meds to Beds:   [] Yes  [x] No  Home Care:         [] Yes, Name:       [x] No            SNF:  [x] Yes, Name:[facility]   [] No      Do you take a blood thinner? Aspirin,Plavix  Do you take a statins? No    Are you out of any of your  home medications? [] Yes   [x] No  Can you pay for your meds? [x] Yes   [] No  Do you have transportation:            [x] Yes   [] No          What time do you prefer your appointments:  [] AM   [x] PM  [] Anytime    Goals for this admission:   Patient wants to:go back home    Steps to meet goal:   1. Medications   2. Work with therapy   3.         CN provided education to patient on reducing risk for stroke/TIA by modifying /controlling risk factors:of HLD, HTN, JAYCEE Vogt ... Stroke education packet using the Stroke Stoplight and the pamphlet, What everyone should know about Stroke (Brain Attack) placed in Nursing Home packet    Instructed to take the medications as prescribed and dont stop unless ordered by a physician. Educated on  need to follow-up with physician after discharge . Discussed benefits of eating a healthy diet, regularly exercising, and not smoking. Copy of educational materials given to the NH to take home, reviewed signs and symptoms of stroke, including sudden numbness, dizziness, or loss of coordination or balance; weakness on one side of the body, sudden confusion, difficulty speaking, understanding or swallowing, sudden loss of vision, or severe, sudden headache. Emphasized the need to call 911 immediately if they are experiencing signs and symptoms of stroke. [facility] education provided, [facility] magnet given to patient. All education with teachback and questions answered     CN Contact information card given [address] [facility]    Dysphagia screen done prior to any oral intake (including meds)                                       No    Date & Time of screening-12/27 2100 Mukund Flannery RN  Date & time of first medication-12/27 2055 Mukund Flannery RN  Did the pt fail the dysphagia screen? If yes,call the physician for SLP order, make the patient NPO and change oral medications to other routes   No    VTE Prophylaxis given by day 2 of admission ( day 1 starts on adm date,this excludes obs status and ED)  Yes  If VTE not ordered, did the physician chart BOTH a pharmacological and mechanical reasons ( in context to VTE) why it wan not ordered?   NA  Was the patient given an antithrombotic by end of day 2? (day 1 starts on patients arrival date)  Yes  If the pt did not have an order for antithrombotic by day 2, did the physician document why the pt did not receive it? ( NPO status and an allergy to an antithrombotic are not acceptable

## 2019-12-30 NOTE — PROGRESS NOTES
Physical Therapy    Physical Therapy Treatment Note  Name: Mark Marmolejo MRN: [id number] :   1921   Date:  2019   Admission Date: 2019 Room:  [address] [place] [address]   Restrictions/Precautions:        Fall risk  Communication with other providers:  Handoff to  and RN  Subjective:  Patient states:  Amenable to PT  Pain:   Location, Type, Intensity (0/10 to 10/10): Denies  Objective:    Observation:  Supine in bed  Treatment, including education/measures:  Sup to sit: mod verbal cues for sequencing, mod A at trunk and hips, min A at LE. Transfers: STS from bed, chair, and multiple times from commode, mod A with use of RW, verbal cues for set up, positioning and anterior weight shift, patient demonstrates ankle PF d/t pain in feet from cellulitis. Standing balance: CGA with use of RW, min cues for safety, performed x3, at commode x2 for pericare and at sink for hand hygiene. Gait: patient ambulated x10ft x2 to and from BR with RW, CGA for stability, min A for AD negotiation. Increased time d/t increased processing time for patient and [place]. Educated daughter on d/c planning and recommendations. Assessment / Impression:       Patient's tolerance of treatment:  Tolerated well   Adverse Reaction: Fatigued at end of session  Significant change in status and impact:  Improved mobiltiy  Barriers to improvement:  Strength, balance, pain  Plan for Next Session:    Cont POC. Time in:  1048  Time out:  1141  Timed treatment minutes:  53  Total treatment time:  48    Previously filed items:     Short term goals  Time Frame for Short term goals: 1 week  Short term goal 1: Patient will perform supine to sit mod I. Short term goal 2: Patient will perform STS with min A and RW. Short term goal 3: Patient will perform stand pivot transfer with min A and RW.        Electronically signed by:    Phyllis Ellis PT  2019, 4:12 PM

## 2019-12-30 NOTE — PROGRESS NOTES
NEUROLOGY NOTE  DR. Conrad Phalen MD.  -------------------------------------------------  Subjective:    Pt doing better this afternoon    Pt was confused and agitated last night    pts daughter states she was on aricept which caused confusion in the past    Objective:    BP (!) 161/67   Pulse 81   Temp 98.9 °F (37.2 °C) (Oral)   Resp 18   Ht 5' 7\" (1.702 m)   Wt 142 lb (64.4 kg)   SpO2 96%   BMI 22.24 kg/m²   HEENT nl      Neuro exam    Alert Oriented  X 2  Follow simple commands  Moderate dementia  EOMI Pupils 3 mm salyl  5/5 all 4 extremities      RADIOLOGY  -----------------    Xr Abdomen (kub) (single Ap View)    Result Date: 12/28/2019  EXAMINATION: ONE SUPINE XRAY VIEW(S) OF THE ABDOMEN 12/28/2019 2:45 pm COMPARISON: Abdomen radiograph 09/05/2019 HISTORY: ORDERING SYSTEM PROVIDED HISTORY: Left lower quadrant pain, constipation TECHNOLOGIST PROVIDED HISTORY: Reason for exam:->Left lower quadrant pain, constipation Reason for Exam: Left lower quadrant pain, constipation Acuity: Acute Type of Exam: Initial FINDINGS: Clear included lung bases. Interstitial prominence likely due to chronic changes. Nonobstructive bowel gas pattern. Minimal to mild stool. No abnormal abdominal calcifications. Patchy atherosclerotic calcifications in the pelvis. Excreted intravenous contrast in the urinary bladder. Partially included changes of right total hip arthroplasty with no evident complication. Diffuse osseous demineralization. No obvious acute fracture. Native joints maintain anatomic alignment. Nonobstructive bowel gas pattern with a minimal to mild stool volume.      Ct Head Wo Contrast    Result Date: 12/27/2019  EXAMINATION: CT OF THE HEAD WITHOUT CONTRAST  12/27/2019 6:40 pm TECHNIQUE: CT of the head was performed without the administration of intravenous contrast. Dose modulation, iterative reconstruction, and/or weight based adjustment of the mA/kV was utilized to reduce the radiation dose to as evidence of spinal canal narrowing. SOFT TISSUES: No paraspinal mass is seen. There is apical scarring and pleural calcification at the right lung apex. There is mild septal thickening and mild ground-glass attenuation at the lung apices, may be related to mild pulmonary vascular congestion. No acute intracranial abnormality. No acute abnormality in the cervical spine. Ct Cervical Spine Wo Contrast    Result Date: 12/18/2019  EXAMINATION: CT OF THE HEAD WITHOUT CONTRAST; CT OF THE CERVICAL SPINE WITHOUT CONTRAST 12/18/2019 8:24 am TECHNIQUE: CT of the head was performed without the administration of intravenous contrast. Dose modulation, iterative reconstruction, and/or weight based adjustment of the mA/kV was utilized to reduce the radiation dose to as low as reasonably achievable.; CT of the cervical spine was performed without the administration of intravenous contrast. Multiplanar reformatted images are provided for review. Dose modulation, iterative reconstruction, and/or weight based adjustment of the mA/kV was utilized to reduce the radiation dose to as low as reasonably achievable. COMPARISON: CT head August 28, 2019, CT cervical spine August 18, 2019 HISTORY: ORDERING SYSTEM PROVIDED HISTORY: fall TECHNOLOGIST PROVIDED HISTORY: Reason for exam:->fall Has a \"code stroke\" or \"stroke alert\" been called? ->No Reason for Exam: fall with left posterior head laceration Acuity: Acute Type of Exam: Initial; ORDERING SYSTEM PROVIDED HISTORY: fall TECHNOLOGIST PROVIDED HISTORY: Reason for exam:->fall Reason for Exam: fall today with upper neck pain Acuity: Acute Type of Exam: Initial FINDINGS: CT brain: BRAIN/VENTRICLES: There is mild parenchymal volume loss. There is periventricular white matter low attenuation, likely related to mild chronic microvascular disease. There is no acute intracranial hemorrhage, mass effect or midline shift. No abnormal extra-axial fluid collection.   There are old lacunar alert\" been called? ->Yes Reason for exam:->Dizziness Reason for Exam: stroke alert, dizzy weakness Type of Exam: Initial Additional signs and symptoms: 80 cc isovue 370 Relevant Medical/Surgical History: hx/ cva,; ORDERING SYSTEM PROVIDED HISTORY: Dizziness TECHNOLOGIST PROVIDED HISTORY: Has a \"code stroke\" or \"stroke alert\" been called? ->Yes Reason for exam:->Dizziness Reason for Exam: stroke alert,, dizzy, weakness Acuity: Acute Type of Exam: Initial Additional signs and symptoms: 80 cc isovue 370/// cta neck Relevant Medical/Surgical History: hx cva, pacer FINDINGS: CTA NECK: AORTIC ARCH/ARCH VESSELS: No dissection or arterial injury. No significant stenosis of the brachiocephalic or subclavian arteries. CAROTID ARTERIES: Moderate calcific plaque identified involving both the bifurcations. 40% narrowing right proximal ICA and 50% narrowing left proximal ICA per NASCET criteria no dissection, arterial injury, or hemodynamically significant stenosis by NASCET criteria. VERTEBRAL ARTERIES: 50% narrowing at the ostium of the left vertebral artery. Otherwise common no dissection, arterial injury, or significant stenosis. SOFT TISSUES: Interstitial thickening identified both lower lobes worrisome for interstitial edema. Patchy opacity identified involving right upper lobe with some pleural base calcifications noted. No cervical or superior mediastinal lymphadenopathy. The larynx and pharynx are unremarkable. No acute abnormality of the salivary and thyroid glands. BONES: Multilevel degenerate changes of the cervical spine. CTA HEAD: ANTERIOR CIRCULATION: Moderate tandem areas of stenosis identified involving both ACAs without definite severe occlusion. No significant stenosis of the intracranial internal carotid,  or middle cerebral arteries. No aneurysm. POSTERIOR CIRCULATION: Fetal origin PCAs. There is occlusion involving the mid basilar artery.   Moderate stenosis identified involving the proximal basilar artery. Mild-to-moderate plaque identified involving both vertebral arteries. BRAIN: No mass effect or midline shift. No extra-axial fluid collection. The gray-white differentiation is maintained. Chronic lacune infarct identified left basal ganglia. Generalized involutional change. Chronic small vessel ischemic disease. Fetal origin both PCAs. Mid basilar occlusion. Otherwise moderate atherosclerotic disease identified involving the posterior circulation notably involving the proximal basilar artery and intradural vertebral arteries Moderate tandem areas of stenosis involving the ACAs. Moderate plaque both proximal ICAs without hemodynamic stenosis or occlusion. LAB RESULTS  --------------------    Recent Results (from the past 24 hour(s))   Vancomycin, random    Collection Time: 12/29/19  4:13 AM   Result Value Ref Range    Vancomycin Rm 5.9 UG/ML    Vancomycin Rm  UG/ML     (NOTE)  Therapeutic Range Interpretation: Please refer to current dosing   guidelines. DOSE AMOUNT DOSE AMT.  GIVEN - 1000 MG     DOSE TIME DOSE TIME GIVEN - .    Vitamin B12 & Folate    Collection Time: 12/29/19  4:13 AM   Result Value Ref Range    Vitamin B-12 984.7 (H) 211 - 911 pg/ml    Folate 10.7 3.1 - 17.5 NG/ML   TSH without Reflex    Collection Time: 12/29/19  4:13 AM   Result Value Ref Range    TSH, High Sensitivity 3.700 0.270 - 4.20 uIu/ml   Troponin    Collection Time: 12/29/19  4:13 AM   Result Value Ref Range    Troponin T 0.018 (H) <0.01 NG/ML         Medical problems    Patient Active Problem List:     Atrial fibrillation (HCC)     Hypokalemia     Pacemaker     Obstipation     Ischemic stroke (HCC)     Spastic hemiparesis of right dominant side due to acute cerebral infarction ([address]     Dysphagia due to recent cerebral infarction     Dysarthria due to acute stroke ([address]     KEYONA (acute kidney injury) ([address]     Essential hypertension     Slurred speech      ASSESSMENT:  ---------------------    TIA r/o cardio

## 2019-12-30 NOTE — CARE COORDINATION
Attempted to see patient for dc planning and Debe Reading PT in room completing her eval. CM will re-attempt. 12:45 PM  CM re-attempted and ECHO is at bedside. 1:45 PM  Attempted to see patient again for dc planning and physician is at bedside. 2:16 PM  Chart reviewed, in to see pt for dc planning. Introduced self and board updated. Pt was admitted for slurred speech. Currently her daughter and son in law are at bedside and Dr. Dori Doshi is finishing up with patient/family questions. Dr. Dori Doshi states patient is ready for dc today. CM spoke with [facility] admissions and in order for patient to go skilled she must go to the [facility] center from her [address] apartment. Patient nor family are interested in this and request for her to return to [place] with outpatient therapy order. CM updated Dr. Dori Doshi who is agreeable to this and states he will enter orders. Family requested medical transportation per wheelchair. YANNICK spoke with Garrick Chamorro with [facility] and wc set up for 3 pm. Updated Dr. Luis Fernando White RN and pt/family of transportation timing. CM available if needs arise.

## 2019-12-31 LAB
EKG ATRIAL RATE: 91 BPM
EKG DIAGNOSIS: NORMAL
EKG P AXIS: 80 DEGREES
EKG P-R INTERVAL: 174 MS
EKG Q-T INTERVAL: 370 MS
EKG QRS DURATION: 86 MS
EKG QTC CALCULATION (BAZETT): 455 MS
EKG R AXIS: 33 DEGREES
EKG T AXIS: 46 DEGREES
EKG VENTRICULAR RATE: 91 BPM

## 2019-12-31 NOTE — PROGRESS NOTES
NEUROLOGY NOTE  DR. Fabby Edwards MD.  -------------------------------------------------  Subjective:    Pt doing better this afternoon    Pt was confused and agitated last night    pts daughter states she was on aricept which caused confusion in the past    Objective:    BP (!) 177/82   Pulse 79   Temp 98.9 °F (37.2 °C) (Oral)   Resp 22   Ht 5' 7\" (1.702 m)   Wt 142 lb (64.4 kg)   SpO2 97%   BMI 22.24 kg/m²   HEENT nl      Neuro exam    Alert Oriented  X 2  Follow simple commands  Moderate dementia  EOMI Pupils 3 mm sally  5/5 all 4 extremities      RADIOLOGY  -----------------    Xr Abdomen (kub) (single Ap View)    Result Date: 12/28/2019  EXAMINATION: ONE SUPINE XRAY VIEW(S) OF THE ABDOMEN 12/28/2019 2:45 pm COMPARISON: Abdomen radiograph 09/05/2019 HISTORY: ORDERING SYSTEM PROVIDED HISTORY: Left lower quadrant pain, constipation TECHNOLOGIST PROVIDED HISTORY: Reason for exam:->Left lower quadrant pain, constipation Reason for Exam: Left lower quadrant pain, constipation Acuity: Acute Type of Exam: Initial FINDINGS: Clear included lung bases. Interstitial prominence likely due to chronic changes. Nonobstructive bowel gas pattern. Minimal to mild stool. No abnormal abdominal calcifications. Patchy atherosclerotic calcifications in the pelvis. Excreted intravenous contrast in the urinary bladder. Partially included changes of right total hip arthroplasty with no evident complication. Diffuse osseous demineralization. No obvious acute fracture. Native joints maintain anatomic alignment. Nonobstructive bowel gas pattern with a minimal to mild stool volume.      Ct Head Wo Contrast    Result Date: 12/27/2019  EXAMINATION: CT OF THE HEAD WITHOUT CONTRAST  12/27/2019 6:40 pm TECHNIQUE: CT of the head was performed without the administration of intravenous contrast. Dose modulation, iterative reconstruction, and/or weight based adjustment of the mA/kV was utilized to reduce the radiation dose to as low as reasonably achievable. COMPARISON: CT head December 18, 2019 HISTORY: ORDERING SYSTEM PROVIDED HISTORY: Dizziness TECHNOLOGIST PROVIDED HISTORY: Has a \"code stroke\" or \"stroke alert\" been called? ->Yes Reason for exam:->Dizziness Reason for Exam: stroke alert,, weakness, dizzy Acuity: Acute Type of Exam: Initial Additional signs and symptoms: none Relevant Medical/Surgical History: hx cva, pacer FINDINGS: BRAIN/VENTRICLES: There is no acute intracranial hemorrhage, mass effect or midline shift. No abnormal extra-axial fluid collection. Redemonstration of remote bilateral basal ganglia lacunar infarctions. The gray-white differentiation is otherwise grossly maintained without evidence of an acute infarct. There is no evidence of hydrocephalus. There is mild periventricular and subcortical white matter hypoattenuation most consistent with microvascular ischemic changes. Atherosclerotic changes of the intracranial vasculature. There is mild age appropriate global cerebral atrophy. ORBITS: The visualized portion of the orbits demonstrate no acute abnormality. SINUSES: The visualized paranasal sinuses and mastoid air cells demonstrate no acute abnormality. SOFT TISSUES/SKULL:  No acute abnormality of the visualized skull or soft tissues. 1. No acute intracranial abnormality identified. 2. Redemonstration of chronic bilateral basal ganglia lacunar infarctions. Next chronic microvascular ischemic changes and global cerebral atrophy. Findings were discussed with TG VINSON at 6:53 pm on 12/27/2019. Ct Head Wo Contrast    Result Date: 12/18/2019  EXAMINATION: CT OF THE HEAD WITHOUT CONTRAST; CT OF THE CERVICAL SPINE WITHOUT CONTRAST 12/18/2019 8:24 am TECHNIQUE: CT of the head was performed without the administration of intravenous contrast. Dose modulation, iterative reconstruction, and/or weight based adjustment of the mA/kV was utilized to reduce the radiation dose to as low as reasonably achievable. ; evidence of spinal canal narrowing. SOFT TISSUES: No paraspinal mass is seen. There is apical scarring and pleural calcification at the right lung apex. There is mild septal thickening and mild ground-glass attenuation at the lung apices, may be related to mild pulmonary vascular congestion. No acute intracranial abnormality. No acute abnormality in the cervical spine. Ct Cervical Spine Wo Contrast    Result Date: 12/18/2019  EXAMINATION: CT OF THE HEAD WITHOUT CONTRAST; CT OF THE CERVICAL SPINE WITHOUT CONTRAST 12/18/2019 8:24 am TECHNIQUE: CT of the head was performed without the administration of intravenous contrast. Dose modulation, iterative reconstruction, and/or weight based adjustment of the mA/kV was utilized to reduce the radiation dose to as low as reasonably achievable.; CT of the cervical spine was performed without the administration of intravenous contrast. Multiplanar reformatted images are provided for review. Dose modulation, iterative reconstruction, and/or weight based adjustment of the mA/kV was utilized to reduce the radiation dose to as low as reasonably achievable. COMPARISON: CT head August 28, 2019, CT cervical spine August 18, 2019 HISTORY: ORDERING SYSTEM PROVIDED HISTORY: fall TECHNOLOGIST PROVIDED HISTORY: Reason for exam:->fall Has a \"code stroke\" or \"stroke alert\" been called? ->No Reason for Exam: fall with left posterior head laceration Acuity: Acute Type of Exam: Initial; ORDERING SYSTEM PROVIDED HISTORY: fall TECHNOLOGIST PROVIDED HISTORY: Reason for exam:->fall Reason for Exam: fall today with upper neck pain Acuity: Acute Type of Exam: Initial FINDINGS: CT brain: BRAIN/VENTRICLES: There is mild parenchymal volume loss. There is periventricular white matter low attenuation, likely related to mild chronic microvascular disease. There is no acute intracranial hemorrhage, mass effect or midline shift. No abnormal extra-axial fluid collection.   There are old lacunar infarcts in the bilateral basal ganglia and high convexity left frontal lobe, stable. The gray-white differentiation is maintained without evidence of an acute infarct. There is no evidence of hydrocephalus. ORBITS: The visualized portion of the orbits demonstrate no acute abnormality. SINUSES: The visualized paranasal sinuses and mastoid air cells demonstrate no acute abnormality. SOFT TISSUES/SKULL: No acute abnormality of the visualized skull or soft tissues. CT cervical spine: BONES/ALIGNMENT: There is normal alignment of the cervical spine. The vertebral body heights are maintained. No acute fracture or osseous destructive lesion is seen. DEGENERATIVE CHANGES: There is degenerative disc disease with disc space narrowing, endplate changes and small endplate osteophyte formation. There is no evidence of spinal canal narrowing. SOFT TISSUES: No paraspinal mass is seen. There is apical scarring and pleural calcification at the right lung apex. There is mild septal thickening and mild ground-glass attenuation at the lung apices, may be related to mild pulmonary vascular congestion. No acute intracranial abnormality. No acute abnormality in the cervical spine. Xr Chest Portable    Result Date: 12/27/2019  EXAMINATION: ONE XRAY VIEW OF THE CHEST 12/27/2019 7:13 pm COMPARISON: Chest x-ray September 5, 2019 and August 18, 2019 HISTORY: ORDERING SYSTEM PROVIDED HISTORY: chest pain TECHNOLOGIST PROVIDED HISTORY: Reason for exam:->chest pain Reason for Exam: chest pain Acuity: Unknown Type of Exam: Initial Additional signs and symptoms: stroke alert Relevant Medical/Surgical History: pacer, htn FINDINGS: Cardiac silhouette is enlarged but stable. Atherosclerotic changes of the aorta. Left-sided cardiac pacer device remains in place. Chronic interstitial changes of the lungs. Blunting of the bilateral costophrenic angles with mild bibasilar opacities present.   Findings favored to reflect small effusions arteries. No aneurysm. POSTERIOR CIRCULATION: Fetal origin PCAs. There is occlusion involving the mid basilar artery. Moderate stenosis identified involving the proximal basilar artery. Mild-to-moderate plaque identified involving both vertebral arteries. BRAIN: No mass effect or midline shift. No extra-axial fluid collection. The gray-white differentiation is maintained. Chronic lacune infarct identified left basal ganglia. Generalized involutional change. Chronic small vessel ischemic disease. Fetal origin both PCAs. Mid basilar occlusion. Otherwise moderate atherosclerotic disease identified involving the posterior circulation notably involving the proximal basilar artery and intradural vertebral arteries Moderate tandem areas of stenosis involving the ACAs. Moderate plaque both proximal ICAs without hemodynamic stenosis or occlusion. Cta Head W Contrast    Result Date: 12/27/2019  EXAMINATION: CTA OF THE NECK; CTA OF THE HEAD WITH CONTRAST 12/27/2019 6:42 pm; 12/27/2019 6:44 pm: TECHNIQUE: CTA of the neck was performed with the administration of intravenous contrast. Multiplanar reformatted images are provided for review. MIP images are provided for review. Stenosis of the internal carotid arteries measured using NASCET criteria. Dose modulation, iterative reconstruction, and/or weight based adjustment of the mA/kV was utilized to reduce the radiation dose to as low as reasonably achievable.; CTA of the head/brain was performed with the administration of intravenous contrast. Multiplanar reformatted images are provided for review. MIP images are provided for review. Dose modulation, iterative reconstruction, and/or weight based adjustment of the mA/kV was utilized to reduce the radiation dose to as low as reasonably achievable.  COMPARISON: CT head 12/27/2019, MRA brain 08/19/2019 HISTORY: ORDERING SYSTEM PROVIDED HISTORY: Dizziness TECHNOLOGIST PROVIDED HISTORY: Has a \"code stroke\" or \"stroke MCV 91.4 78 - 100 FL    MCH 28.9 27 - 31 PG    MCHC 31.6 (L) 32.0 - 36.0 %    RDW 13.8 11.7 - 14.9 %    Platelets 880 241 - 276 K/CU MM    MPV 11.6 (H) 7.5 - 11.1 FL   Troponin    Collection Time: 12/30/19  3:41 AM   Result Value Ref Range    Troponin T 0.017 (H) <0.01 NG/ML         Medical problems    Patient Active Problem List:     Atrial fibrillation (HCC)     Hypokalemia     Pacemaker     Obstipation     Ischemic stroke (HCC)     Spastic hemiparesis of right dominant side due to acute cerebral infarction ([facility] [address]     Dysphagia due to recent cerebral infarction     Dysarthria due to acute stroke ([facility] [address]     KEYONA (acute kidney injury) ([facility] [address]     Essential hypertension     Slurred speech      ASSESSMENT:  ---------------------    TIA r/o cardio carotid embolic event     Hx CVA     Bilateral basal ganglia old infarcts     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 pts daughter states pt does not have syncopy.         Electronically signed by Filomena Zelaya MD on 12/30/2019 at 11:59 PM

## 2020-01-02 NOTE — DISCHARGE SUMMARY
Discharge Summary Note  Patient ID:  Julienne Snowden  [id number]  8 y.o.  6/26/1921    Admit date: 12/27/2019    Discharge date and time: 12/30/2019  3:40 PM     Admitting Physician: Yehuda Caicedo MD     Discharge Physician: Mary Miranda MD    Admission Diagnoses:   Slurred speech [R47.81]  Slurred speech [R47.81]    Discharge Diagnoses:   Metabolic encephalopathy likely from stroke  Sub acute Infarct left basal ganglia  RLE Cellulitis-Improving  Dementia  HTN  Hyponatremia, Resolved    Admission Condition: fair    Discharged Condition: stable    Hospital Course:   77-year-old female with a past medical history of CVA, presented to ER with the complaints of dizziness, worsening of slurred speech and generalized weakness. Patient was evaluated for CVA, consulted [facility] tele-neurology the night of admission, who recommended that patient is not a candidate for TPA and recommended to get CTA neck and head which showed bilateral basilar vertebral RADHA cavernous ICA nonsurgical stenosis with atherosclerotic disease. Neurology were consulted recommended to get MRI, which did reveal restricted diffusion in the left basal ganglia which likely represent a subacute infarct. Neurology tried to increase the dose of Namenda and also started on Aricept for which she got confused hence Aricept was discontinued and Namenda was put back to home dose. Limited echo showed good left ventricular function. Treated her Cellulitis with IV antibiotics, which changed to oral on discharged. He is being discharged back to [facility]. Her blood pressure medications were readjusted upon discharge. Discussed with Dr. Victoria Norris, about the changes.     Consults: Neurology    Significant Diagnostic Studies:   MRI brain WO contrast   Impression:       Motion artifact degrades the images. Edith Bellis is a very subtle area of  restricted diffusion in the left basal ganglia that likely represents a  subacute area of infarct measuring only a few millimeters.  No other areas of  restricted diffusion are identified. Cerebral atrophy.  Chronic small vessel ischemic changes         CTA NECK W CONTRAST   Impression:        Fetal origin both PCAs.  Mid basilar occlusion. Otherwise moderate atherosclerotic disease identified involving the posterior  circulation notably involving the proximal basilar artery and intradural  vertebral arteries    Moderate tandem areas of stenosis involving the ACAs. Moderate plaque both proximal ICAs without hemodynamic stenosis or occlusion. CTA HEAD W CONTRAST    Impression:        Fetal origin both PCAs.  Mid basilar occlusion. Otherwise moderate atherosclerotic disease identified involving the posterior  circulation notably involving the proximal basilar artery and intradural  vertebral arteries    Moderate tandem areas of stenosis involving the ACAs. Moderate plaque both proximal ICAs without hemodynamic stenosis or occlusion. Discharge Exam:  General Appearance: alert, awake  Cardiovascular: normal rate, regular rhythm, normal S1 and S2, no murmurs, rubs, clicks, or gallops, distal pulses intact, no carotid bruits, no JVD  Pulmonary/Chest: clear to auscultation bilaterally- no wheezes, rales or rhonchi, normal air movement, no respiratory distress  Abdomen: soft, non-tender, non-distended, normal bowel sounds, no masses   Extremities: no cyanosis, clubbing or edema, pulse   Skin: Erythema of the rt leg  Head: normocephalic and atraumatic  Eyes: pupils equal, round, and reactive to light  Neck: supple and non-tender without mass, no thyromegaly   Musculoskeletal:Redness and swelling of the rt leg, improved  Neurological:  Strength 5/5 upper and lower extremities, cranial nerves grossly intact, Non focal    Disposition: NH, [facility]    Patient Instructions:     Discharge Medications:    [facility] Medication Instructions NADIR:[id number]    Printed on:01/01/20 1914   Medication Information acetaminophen (TYLENOL) 325 MG tablet  Take 650 mg by mouth every 6 hours as needed for Pain             aspirin 81 MG tablet  Take 81 mg by mouth daily             atorvastatin (LIPITOR) 80 MG tablet  Take 1 tablet by mouth nightly             cefdinir (OMNICEF) 300 MG capsule  Take 1 capsule by mouth 2 times daily for 12 days             clopidogrel (PLAVIX) 75 MG tablet  Take 1 tablet by mouth daily             docusate sodium (COLACE) 100 MG capsule  Take 1 capsule by mouth 2 times daily. doxycycline hyclate (VIBRA-TABS) 100 MG tablet  Take 1 tablet by mouth 2 times daily for 12 days             folic acid-pyridoxine-cyancobalamin (FOLTX) 1.13-25-2 MG TABS  Take 1 tablet by mouth daily             labetalol (NORMODYNE) 100 MG tablet  Take 100 mg by mouth 2 times daily             memantine (NAMENDA) 5 MG tablet  Take 1 tablet by mouth 2 times daily             Misc.  Devices KIT  Physical/occupational therapy             Multiple Vitamins-Minerals (ICAPS AREDS 2 PO)  Take 2 capsules by mouth daily              pantoprazole (PROTONIX) 40 MG tablet  Take 1 tablet by mouth every morning (before breakfast)             polycarbophil (FIBERCON) 625 MG tablet  Take 1 tablet by mouth daily             polyethylene glycol (MIRALAX) powder  Take 17 g by mouth daily              potassium chloride (KLOR-CON M) 20 MEQ extended release tablet  Take 1 tablet by mouth daily (with breakfast)                 Activity: activity as tolerated    Diet: regular diet    Wound Care: none needed    Follow-up:  PCP 1-2 weeks    Electronically signed by Kaylah Wadsworth MD  on 1/1/20 at 7:14 PM

## 2020-01-08 ENCOUNTER — TELEPHONE (OUTPATIENT)
Dept: CARDIOLOGY CLINIC | Age: 85
End: 2020-01-08

## 2020-01-13 ENCOUNTER — TELEPHONE (OUTPATIENT)
Dept: CARDIOLOGY CLINIC | Age: 85
End: 2020-01-13

## 2020-01-14 ENCOUNTER — PROCEDURE VISIT (OUTPATIENT)
Dept: CARDIOLOGY CLINIC | Age: 85
End: 2020-01-14
Payer: MEDICARE

## 2020-01-14 PROCEDURE — 93296 REM INTERROG EVL PM/IDS: CPT | Performed by: INTERNAL MEDICINE

## 2020-01-14 PROCEDURE — 93294 REM INTERROG EVL PM/LDLS PM: CPT | Performed by: INTERNAL MEDICINE

## 2020-03-16 ENCOUNTER — APPOINTMENT (OUTPATIENT)
Dept: GENERAL RADIOLOGY | Age: 85
End: 2020-03-16
Payer: MEDICARE

## 2020-03-16 ENCOUNTER — HOSPITAL ENCOUNTER (EMERGENCY)
Age: 85
Discharge: HOME | End: 2020-03-16
Attending: EMERGENCY MEDICINE
Payer: MEDICARE

## 2020-03-16 VITALS
RESPIRATION RATE: 16 BRPM | WEIGHT: 142 LBS | HEIGHT: 67 IN | TEMPERATURE: 97.8 F | SYSTOLIC BLOOD PRESSURE: 156 MMHG | DIASTOLIC BLOOD PRESSURE: 128 MMHG | BODY MASS INDEX: 22.29 KG/M2 | HEART RATE: 99 BPM | OXYGEN SATURATION: 99 %

## 2020-03-16 PROCEDURE — 6370000000 HC RX 637 (ALT 250 FOR IP): Performed by: EMERGENCY MEDICINE

## 2020-03-16 PROCEDURE — 99283 EMERGENCY DEPT VISIT LOW MDM: CPT

## 2020-03-16 PROCEDURE — 73501 X-RAY EXAM HIP UNI 1 VIEW: CPT

## 2020-03-16 RX ORDER — HYDROCODONE BITARTRATE AND ACETAMINOPHEN 5; 325 MG/1; MG/1
1 TABLET ORAL ONCE
Status: COMPLETED | OUTPATIENT
Start: 2020-03-16 | End: 2020-03-16

## 2020-03-16 RX ADMIN — HYDROCODONE BITARTRATE AND ACETAMINOPHEN 1 TABLET: 5; 325 TABLET ORAL at 22:00

## 2020-03-16 ASSESSMENT — PAIN DESCRIPTION - ONSET: ONSET: SUDDEN

## 2020-03-16 ASSESSMENT — PAIN DESCRIPTION - ORIENTATION: ORIENTATION: RIGHT

## 2020-03-16 ASSESSMENT — PAIN SCALES - GENERAL: PAINLEVEL_OUTOF10: 5

## 2020-03-16 ASSESSMENT — PAIN DESCRIPTION - FREQUENCY: FREQUENCY: CONTINUOUS

## 2020-03-16 ASSESSMENT — PAIN DESCRIPTION - PAIN TYPE: TYPE: ACUTE PAIN

## 2020-03-16 ASSESSMENT — PAIN DESCRIPTION - LOCATION: LOCATION: HIP

## 2020-03-17 NOTE — ED PROVIDER NOTES
Triage Chief Complaint:   Hip Pain (Right/Fall)    [place]:  Nusrat Ortiz is a 80 y.o. female that presents sent from nursing home for a fall with right hip pain. Patient is nonambulatory, demented at baseline and was being transferred from wheelchair to bed when she fell onto her right hip. No reported head injury. The patient denies pain anywhere else. She has pain with right hip movement. She states that she is not sure what happened. She states that she does not know how she ambulates at baseline. She does appear to be severely demented at baseline. Patient is a DNR CC.    ROS:  Unable to obtain. Past Medical History:   Diagnosis Date    Arthritis     Cerebral artery occlusion with cerebral infarction ([address]     Hyperlipidemia     Hypertension     Impaction of the bowels ([address]     Pacemaker 6/9/16    Medtronic Dual chamber implanted per [facility] for Asystole for 6 seconds (SSS & intermittent high grade AVB).  SSS (sick sinus syndrome) ([address] 6/16     Past Surgical History:   Procedure Laterality Date    APPENDECTOMY      BACK SURGERY      denervation x2    COLONOSCOPY      EYE SURGERY Bilateral     cataract removal    HIP SURGERY      JOINT REPLACEMENT      R hip, R knee replacement    KNEE SURGERY      PACEMAKER INSERTION  6/9/16    Medtronic Dual-Chamber pacemaker per [facility].  PACEMAKER PLACEMENT      TONSILLECTOMY       History reviewed. No pertinent family history. Social History     Socioeconomic History    Marital status:       Spouse name: Not on file    Number of children: Not on file    Years of education: Not on file    Highest education level: Not on file   Occupational History    Not on file   Social Needs    Financial resource strain: Not on file    Food insecurity     Worry: Not on file     Inability: Not on file    Transportation needs     Medical: Not on file     Non-medical: Not on file   Tobacco Use    Smoking status: Never Smoker    Smokeless the absence of a cardiologist)    MDM:  Plan of care is discussed thoroughly with the patient and family if present. If performed, all imaging and lab work also discussed with patient. All relevant prior results and chart reviewed if available. Patient presents as above. She presents with right hip injury. Patient is DNR CC and nonambulatory at baseline. She is given Norco for pain control. No other obvious injuries on exam.  X-rays here do not show any evidence of acute fracture. Patient is discharged back to facility. Clinical Impression:  1.  Contusion of right hip, initial encounter      (Please note that portions of this note may have been completed with a voice recognition program. Efforts were made to edit the dictations but occasionally words are mis-transcribed.)    MD Santiago Christian MD  03/16/20 [id number]

## 2020-03-17 NOTE — ED TRIAGE NOTES
Pt is from [facility], and her nurse is Yvonne Murphy [phone] pt fell while transfering from wheelchair to bed. Pt is advanced Dementia pt. Pt needs BP cuff on right arm  Pills needs crushed  No Ice.

## 2020-03-17 NOTE — ED NOTES
Bed: H-03  Expected date: 3/16/20  Expected time: 8:55 PM  Means of arrival: [facility]  Comments:     Ritchie Brown RN  03/16/20 2056

## 2020-04-29 ENCOUNTER — TELEPHONE (OUTPATIENT)
Dept: CARDIOLOGY CLINIC | Age: 85
End: 2020-04-29

## 2020-04-29 ENCOUNTER — PROCEDURE VISIT (OUTPATIENT)
Dept: CARDIOLOGY CLINIC | Age: 85
End: 2020-04-29
Payer: MEDICARE

## 2020-04-29 PROCEDURE — 93294 REM INTERROG EVL PM/LDLS PM: CPT | Performed by: INTERNAL MEDICINE

## 2020-04-29 PROCEDURE — 93296 REM INTERROG EVL PM/IDS: CPT | Performed by: INTERNAL MEDICINE

## 2020-04-29 NOTE — LETTER
[address]  [address] [place] [address]  Phone: [phone]  Fax: [phone]            April 29, 2020    Dafne Kruger  [address] [address]      Dear Sarahy Mcgarry: This is your [facility]EL[facility] schedule. Please marcelina your calendar with these dates. You can do your checks anytime during the scheduled day. Since we do not do reminder calls, it will be your responsibility to perform the checks on the day it is scheduled. If you have any questions or concerns, please call and ask for Weston Gibson at [phone].

## 2020-09-04 RX ORDER — PRAVASTATIN SODIUM 10 MG
TABLET ORAL
Qty: 90 TABLET | OUTPATIENT
Start: 2020-09-04

## 2020-12-10 PROCEDURE — 93296 REM INTERROG EVL PM/IDS: CPT | Performed by: INTERNAL MEDICINE

## 2020-12-10 PROCEDURE — 93294 REM INTERROG EVL PM/LDLS PM: CPT | Performed by: INTERNAL MEDICINE

## 2020-12-11 ENCOUNTER — PROCEDURE VISIT (OUTPATIENT)
Dept: CARDIOLOGY CLINIC | Age: 85
End: 2020-12-11
Payer: MEDICARE

## 2022-02-03 NOTE — PROGRESS NOTES
Date    WBC 6.2 09/05/2019    HGB 12.2 (L) 09/05/2019    HCT 36.7 (L) 09/05/2019    MCV 92.0 09/05/2019     09/05/2019     Lab Results   Component Value Date    INR 1.00 08/28/2019    INR 0.97 02/10/2015    PROTIME 11.6 08/28/2019    PROTIME 11.1 02/10/2015     Lab Results   Component Value Date    CREATININE 1.2 (H) 09/05/2019    BUN 36 (H) 09/05/2019     09/05/2019    K 4.4 09/05/2019    CL 99 09/05/2019    CO2 25 09/05/2019     Lab Results   Component Value Date    ALT 21 09/05/2019    AST 23 09/05/2019    ALKPHOS 115 09/05/2019    BILITOT 0.2 09/05/2019       Expected length of stay  prior to a supervised level of function for discharge home with a walker and [facility] OT/PT is 9/10/2019. Recommendations:    1. Ischemic stroke with infarction: Generally better tolerance for her daily occupational and physical therapy with speech-language pathology. Deliberate with feeding and swallowing but no recent choking episodes. No longer having intermittent abdominal/chest discomfort which likely was a GI issue. She has completed her antibiotic for UTI and her stomach seems better. She needs the stroke prophylaxis therapy. GI prophylaxis offered. Arrangements for discharge to an assisted living apartment tomorrow with outpatient therapies have been made. She will follow-up with her PCP in 1 week. 2. DVT prophylaxis: Lovenox 30 mg subcu daily.  I must monitor her hemoglobin and platelet count while on this medication.  Weightbearing activities are slowly increasing.  GI prophylaxis tolerated.  Struggling with left neglect. No new bruising. Expect to stop the Lovenox at discharge. 3. Uncontrolled hypertension: Tolerating the labetalol, hydrochlorothiazide and lisinopril.  Target systolic blood pressure is 140-150. .  Consistent oral intake encouraged.  Vital signs are checked at before meals and at bedtime.   Wanting to avoid drops in blood pressure or dizziness which aggressive HTN mgmt has worsened stretcher

## 2023-04-29 NOTE — PLAN OF CARE
improved sensory functioning will increase  Description  Demonstrations of improved sensory functioning will increase  Outcome: Ongoing  Goal: Decrease in sensory misperception frequency  Description  Decrease in sensory misperception frequency  Outcome: Ongoing  Goal: Able to refrain from responding to false sensory perceptions  Description  Able to refrain from responding to false sensory perceptions  Outcome: Ongoing  Goal: Demonstrates accurate environmental perceptions  Description  Demonstrates accurate environmental perceptions  Outcome: Ongoing  Goal: Able to distinguish between reality-based and nonreality-based thinking  Description  Able to distinguish between reality-based and nonreality-based thinking  Outcome: Ongoing  Goal: Able to interrupt nonreality-based thinking  Description  Able to interrupt nonreality-based thinking  Outcome: Ongoing     Problem: Sleep Pattern Disturbance:  Goal: Appears well-rested  Description  Appears well-rested  Outcome: Ongoing yes